# Patient Record
Sex: FEMALE | Race: BLACK OR AFRICAN AMERICAN | Employment: FULL TIME | URBAN - METROPOLITAN AREA
[De-identification: names, ages, dates, MRNs, and addresses within clinical notes are randomized per-mention and may not be internally consistent; named-entity substitution may affect disease eponyms.]

---

## 2017-01-25 ENCOUNTER — OFFICE VISIT (OUTPATIENT)
Dept: ORTHOPEDIC SURGERY | Age: 57
End: 2017-01-25

## 2017-01-25 VITALS
SYSTOLIC BLOOD PRESSURE: 148 MMHG | HEIGHT: 67 IN | WEIGHT: 227 LBS | BODY MASS INDEX: 35.63 KG/M2 | DIASTOLIC BLOOD PRESSURE: 72 MMHG | HEART RATE: 97 BPM

## 2017-01-25 DIAGNOSIS — M54.41 LUMBAGO WITH SCIATICA, RIGHT SIDE: ICD-10-CM

## 2017-01-25 DIAGNOSIS — M54.42 LUMBAGO WITH SCIATICA, LEFT SIDE: ICD-10-CM

## 2017-01-25 DIAGNOSIS — M48.061 LUMBAR SPINAL STENOSIS: Primary | ICD-10-CM

## 2017-01-25 RX ORDER — HYDROCODONE BITARTRATE AND ACETAMINOPHEN 5; 325 MG/1; MG/1
1 TABLET ORAL
Qty: 30 TAB | Refills: 0 | Status: SHIPPED | OUTPATIENT
Start: 2017-01-25 | End: 2021-04-30 | Stop reason: ALTCHOICE

## 2017-01-25 RX ORDER — DIAZEPAM 10 MG/1
TABLET ORAL
Qty: 1 TAB | Refills: 0 | OUTPATIENT
Start: 2017-01-25 | End: 2017-02-27 | Stop reason: ALTCHOICE

## 2017-01-25 RX ORDER — DULOXETIN HYDROCHLORIDE 30 MG/1
30 CAPSULE, DELAYED RELEASE ORAL DAILY
Qty: 90 CAP | Refills: 1 | Status: SHIPPED | OUTPATIENT
Start: 2017-01-25 | End: 2017-06-28 | Stop reason: SDUPTHER

## 2017-01-25 NOTE — PROGRESS NOTES
Chief complaint/History of Present Illness:  Chief Complaint   Patient presents with    Follow-up     lumbar BLE pain. RLE pain going into buttocks. LLE pain radiating to knee     HPI  Carmita Boucher is a  64 y.o.  female      HISTORY OF PRESENT ILLNESS:  The patient comes in today for followup of her chronic low back pain. She has degenerative disc disease with severe stenosis at L4-5. She was doing well at the last visit. However, since December without injury she has had a flare-up of pain that causes her pain to go to a 5-8/10. She brought out her TENS unit and has been using that, which did help some. She is on Cymbalta 30 mg once a day. That usually does help, but with the flare, she is still having the pain. She usually takes a rare Tramadol. That is not really helping. She is requesting a small amount of Norco that she has had a couple of years ago for a flare. She has failed Neurontin and Topamax in the past.  She is diabetic. Her blood sugar runs about 118. She has had lumbar L3 and L4 epidural steroid injections done. The last one was in January 2015 and before that, May 2014, and before that, December 2012. They usually do help her and last a significant amount of time. She denies fever or bowel or bladder dysfunction. She works at [employer] as a . She also uses a handicap sticker, as she has to walk one-quarter mile to her car when she is at work. PHYSICAL EXAM:  Ms. Sergio Hensley is a 59-year-old female. She is alert and oriented. Her skin is warm and dry. She has a normal mood and affect. She has 4/5 strength of the bilateral lower extremities, negative straight leg raise, and full weightbearing, non-antalgic gait. ASSESSENT/PLAN:  This is a patient who has degenerative disc disease and severe stenosis at L4-5. We are going to set her up for a repeat L3-4 lumbar epidural.  I have refilled her Cymbalta.   I am giving her 30 tablets of Norco 5/325 mg, which will probably last her a long time. We will give her a handicap sticker application filled out, and we will see her back after the block. Review of systems:    Past Medical History   Diagnosis Date    Arthritis     Cervical radiculopathy     Diabetes ([address]     Hypercholesteremia     Hypertension      Past Surgical History   Procedure Laterality Date    Hx other surgical Left      wrist tendon release    Hx hysterectomy      Hx breast lumpectomy      Hx tonsillectomy       Social History     Social History    Marital status:      Spouse name: N/A    Number of children: N/A    Years of education: N/A     Occupational History    Not on file. Social History Main Topics    Smoking status: Never Smoker    Smokeless tobacco: Not on file    Alcohol use 0.5 oz/week     1 Glasses of wine per week    Drug use: Not on file    Sexual activity: Not on file     Other Topics Concern    Not on file     Social History Narrative     Family History   Problem Relation Age of Onset    Cancer Other     Diabetes Other     Other Other      Connective tissue disorder       Physical Exam:  Visit Vitals    /72    Pulse 97    Ht 5' 7\" (1.702 m)    Wt 227 lb (103 kg)    BMI 35.55 kg/m2     Pain Scale: 8/10     has been . reviewed and is appropriate      Fela Khanna was seen today for follow-up. Diagnoses and all orders for this visit:    Lumbar spinal stenosis  -     SCHEDULE SURGERY    Lumbago with sciatica, left side  -     SCHEDULE SURGERY    Lumbago with sciatica, right side  -     SCHEDULE SURGERY    Other orders  -     DULoxetine (CYMBALTA) 30 mg capsule; Take 1 Cap by mouth daily.  -     HYDROcodone-acetaminophen (NORCO) 5-325 mg per tablet; Take 1 Tab by mouth every eight (8) hours as needed for Pain. Max Daily Amount: 3 Tabs. Indications: PAIN            Follow-up Disposition:  Return for after block.         We have informed Randy Kendrick to notify us for immediate appointment if she has any worsening neurogical symptoms or if an emergency situation presents, then call [phone]

## 2017-01-25 NOTE — PATIENT INSTRUCTIONS
Back Pain: Care Instructions  Your Care Instructions    Back pain has many possible causes. It is often related to problems with muscles and ligaments of the back. It may also be related to problems with the nerves, discs, or bones of the back. Moving, lifting, standing, sitting, or sleeping in an awkward way can strain the back. Sometimes you don't notice the injury until later. Arthritis is another common cause of back pain. Although it may hurt a lot, back pain usually improves on its own within several weeks. Most people recover in 12 weeks or less. Using good home treatment and being careful not to stress your back can help you feel better sooner. Follow-up care is a key part of your treatment and safety. Be sure to make and go to all appointments, and call your doctor if you are having problems. Its also a good idea to know your test results and keep a list of the medicines you take. How can you care for yourself at home? · Sit or lie in positions that are most comfortable and reduce your pain. Try one of these positions when you lie down:  ¨ Lie on your back with your knees bent and supported by large pillows. ¨ Lie on the floor with your legs on the seat of a sofa or chair. Clovia Evener on your side with your knees and hips bent and a pillow between your legs. ¨ Lie on your stomach if it does not make pain worse. · Do not sit up in bed, and avoid soft couches and twisted positions. Bed rest can help relieve pain at first, but it delays healing. Avoid bed rest after the first day of back pain. · Change positions every 30 minutes. If you must sit for long periods of time, take breaks from sitting. Get up and walk around, or lie in a comfortable position. · Try using a heating pad on a low or medium setting for 15 to 20 minutes every 2 or 3 hours. Try a warm shower in place of one session with the heating pad. · You can also try an ice pack for 10 to 15 minutes every 2 to 3 hours.  Put a thin cloth between the ice pack and your skin. · Take pain medicines exactly as directed. ¨ If the doctor gave you a prescription medicine for pain, take it as prescribed. ¨ If you are not taking a prescription pain medicine, ask your doctor if you can take an over-the-counter medicine. · Take short walks several times a day. You can start with 5 to 10 minutes, 3 or 4 times a day, and work up to longer walks. Walk on level surfaces and avoid hills and stairs until your back is better. · Return to work and other activities as soon as you can. Continued rest without activity is usually not good for your back. · To prevent future back pain, do exercises to stretch and strengthen your back and stomach. Learn how to use good posture, safe lifting techniques, and proper body mechanics. When should you call for help? Call your doctor now or seek immediate medical care if:  · You have new or worsening numbness in your legs. · You have new or worsening weakness in your legs. (This could make it hard to stand up.)  · You lose control of your bladder or bowels. Watch closely for changes in your health, and be sure to contact your doctor if:  · Your pain gets worse. · You are not getting better after 2 weeks. Where can you learn more? Go to http://tamera-mattie.info/. Enter A997 in the search box to learn more about \"Back Pain: Care Instructions. \"  Current as of: May 23, 2016  Content Version: 11.1  © 8065-5906 [employer]. Care instructions adapted under license by [employer] (which disclaims liability or warranty for this information). If you have questions about a medical condition or this instruction, always ask your healthcare professional. [address] any warranty or liability for your use of this information.

## 2017-02-27 ENCOUNTER — OFFICE VISIT (OUTPATIENT)
Dept: ORTHOPEDIC SURGERY | Age: 57
End: 2017-02-27

## 2017-02-27 VITALS
RESPIRATION RATE: 18 BRPM | WEIGHT: 231.2 LBS | SYSTOLIC BLOOD PRESSURE: 150 MMHG | HEIGHT: 67 IN | BODY MASS INDEX: 36.29 KG/M2 | HEART RATE: 94 BPM | DIASTOLIC BLOOD PRESSURE: 78 MMHG

## 2017-02-27 DIAGNOSIS — M51.36 DDD (DEGENERATIVE DISC DISEASE), LUMBAR: ICD-10-CM

## 2017-02-27 DIAGNOSIS — M48.061 SPINAL STENOSIS, LUMBAR: Primary | ICD-10-CM

## 2017-02-27 DIAGNOSIS — M51.26 LUMBAR HERNIATED DISC: ICD-10-CM

## 2017-02-27 RX ORDER — DULOXETIN HYDROCHLORIDE 60 MG/1
60 CAPSULE, DELAYED RELEASE ORAL DAILY
Qty: 60 CAP | Refills: 1 | Status: SHIPPED | OUTPATIENT
Start: 2017-02-27 | End: 2017-03-29 | Stop reason: SDUPTHER

## 2017-02-27 NOTE — MR AVS SNAPSHOT
Visit Information Date & Time Provider Department Dept. Phone Encounter #  
 2/27/2017  2:55 PM Dolly Green MD [facility] - [facility] [phone] [id number] Follow-up Instructions Return in about 4 weeks (around 3/27/2017). Upcoming Health Maintenance Date Due Hepatitis C Screening 1960 DTaP/Tdap/Td series (1 - Tdap) 7/11/1981 PAP AKA CERVICAL CYTOLOGY 7/11/1981 BREAST CANCER SCRN MAMMOGRAM 7/11/2010 FOBT Q 1 YEAR AGE 50-75 7/11/2010 INFLUENZA AGE 9 TO ADULT 8/1/2016 Allergies as of 2/27/2017  Review Complete On: 2/27/2017 By: Dolly Green MD  
  
 Severity Noted Reaction Type Reactions Actos [Pioglitazone]    Unable to Obtain Daypro [Oxaprozin]  10/15/2012    Unknown (comments) Lisinopril    Unable to Obtain Other Medication    Unable to Obtain Other allergies are: Antiinflammatories Algesics And several diabetic medications Topamax [Topiramate]  01/25/2017    Other (comments) Unknown reaction Zocor [Simvastatin]  01/07/2016    Seizures Current Immunizations  Never Reviewed No immunizations on file. Not reviewed this visit You Were Diagnosed With   
  
 Codes Comments Spinal stenosis, lumbar    -  Primary ICD-10-CM: M48.06 
ICD-9-CM: 724.02 Lumbar herniated disc     ICD-10-CM: M51.26 
ICD-9-CM: 722.10 DDD (degenerative disc disease), lumbar     ICD-10-CM: M51.36 
ICD-9-CM: 722.52 Vitals BP  
  
  
  
  
  
 150/78 (BP 1 Location: Right arm, BP Patient Position: Sitting) BMI and BSA Data Body Mass Index Body Surface Area  
 36.21 kg/m 2 2.23 m 2 Preferred Pharmacy Pharmacy Name Phone [address] Your Updated Medication List  
  
   
This list is accurate as of: 2/27/17  3:33 PM.  Always use your most recent med list.  
  
  
  
  
 clonazePAM 0.5 mg tablet Commonly known as:  KlonoPIN  
  
 * CRESTOR 40 mg tablet Generic drug:  rosuvastatin Take 40 mg by mouth nightly. * CRESTOR 20 mg tablet Generic drug:  rosuvastatin * DULoxetine 20 mg capsule Commonly known as:  CYMBALTA Take 1 Cap by mouth daily. * DULoxetine 30 mg capsule Commonly known as:  CYMBALTA Take 1 Cap by mouth daily. * DULoxetine 60 mg capsule Commonly known as:  CYMBALTA Take 1 Cap by mouth daily. FLEXERIL 10 mg tablet Generic drug:  cyclobenzaprine Take  by mouth three (3) times daily as needed for Muscle Spasm(s). gabapentin 800 mg tablet Commonly known as:  NEURONTIN Take 1 Tab by mouth three (3) times daily. HYDROcodone-acetaminophen 5-325 mg per tablet Commonly known as:  [place] Cuff Take 1 Tab by mouth every eight (8) hours as needed for Pain. Max Daily Amount: 3 Tabs. Indications: PAIN  
  
 LANTUS SOLOSTAR 100 unit/mL (3 mL) pen Generic drug:  insulin glargine  
  
 losartan 50 mg tab 100 mg, hydrochlorothiazide 25 mg tab 25 mg Take  by mouth daily. metFORMIN 1,000 mg tablet Commonly known as:  GLUCOPHAGE Take 1,000 mg by mouth two (2) times daily (with meals). topiramate 25 mg tablet Commonly known as:  TOPAMAX 3 tabs PO QHS  
  
 traMADol 50 mg tablet Commonly known as:  ULTRAM  
Take 1 Tab by mouth daily. Max Daily Amount: 50 mg.  
  
 TYLENOL EXTRA STRENGTH PO Take  by mouth. * Notice: This list has 5 medication(s) that are the same as other medications prescribed for you. Read the directions carefully, and ask your doctor or other care provider to review them with you. Prescriptions Sent to Pharmacy Refills DULoxetine (CYMBALTA) 60 mg capsule 1 Sig: Take 1 Cap by mouth daily. Class: Normal  
 Pharmacy: [address] [place] [address] #: [phone] Route: Oral  
  
Follow-up Instructions Return in about 4 weeks (around 3/27/2017). Introducing [facility]! Zayra Jacob introduces [facility] patient portal. Now you can access parts of your medical record, email your doctor's office, and request medication refills online. 1. In your internet browser, go to [email or web address] 2. Click on the First Time User? Click Here link in the Sign In box. You will see the New Member Sign Up page. 3. Enter your [facility] Access Code exactly as it appears below. You will not need to use this code after youve completed the sign-up process. If you do not sign up before the expiration date, you must request a new code. · [facility] Access Code: [email or web address] Expires: 4/25/2017  9:38 AM 
 
4. Enter the last four digits of your Social Security Number (xxxx) and Date of Birth (mm/dd/yyyy) as indicated and click Submit. You will be taken to the next sign-up page. 5. Create a [facility] ID. This will be your [facility] login ID and cannot be changed, so think of one that is secure and easy to remember. 6. Create a [facility] password. You can change your password at any time. 7. Enter your Password Reset Question and Answer. This can be used at a later time if you forget your password. 8. Enter your e-mail address. You will receive e-mail notification when new information is available in [address] 9. Click Sign Up. You can now view and download portions of your medical record. 10. Click the Download Summary menu link to download a portable copy of your medical information. If you have questions, please visit the Frequently Asked Questions section of the [facility] website. Remember, [facility] is NOT to be used for urgent needs. For medical emergencies, dial 911. Now available from your iPhone and Android! Please provide this summary of care documentation to your next provider. Your primary care clinician is listed as Lucho Franco.  If you have any questions after today's visit, please call [phone].

## 2017-02-27 NOTE — PROGRESS NOTES
[facility]  [address], [address]  Phone: [phone]  Fax: [phone]        PROGRESS NOTE      HISTORY OF PRESENT ILLNESS:  The patient is a 64 y.o. female and was seen today for follow up of localized low back pain. Patient states she no longer has radiating pain into the LLE. She previously reported having 1-2 episodes of LLE numbness. Patient states symptoms are holding steady. Patient notes slight limitations in walking and standing tolerance and has concerns of not being able to lose weight. Previously, she was seen with c/o low back pain extending into the bilateral lower extremities in an S1 distribution with right lower extremity symptoms extending initially to the knee and left lower extremity symptoms to the ankle. The patient additional had complaints of neck pain. Note from Yumiko Leon NP dated 1/7/16 indicating patient was seen with c/o low back pain into BLE. Taylor Rm Patient failed NEURONTIN and TOPAMAX. Patient tolerates increased Cymbalta 30 mg per day with good relief. She reports rare use of Tramadol. Patient was prescribed Klonopin for insomnia but states she has discontinued use. Patient is completing her HEP daily. She has hx of DM and reports checking her blood sugars 3x/day. She denies hx of glaucoma. At that time, patient was doing well. Lumbar spine MRI per report revealed degenerative disc disease at L3-L4, L4-L5 and L5-S1 with severe spinal canal stenosis at L4-L5 secondary to a disc protrusion and degenerative facet changes. Additionally, the patient had complaints of neck pain. Upper extremity EMG per report revealed evidence of moderately severe carpal tunnel syndrome bilaterally, worse on the right. Cervical spine MRI per report revealed degenerative disc disease at multiple levels with foraminal encroachment and uncovertebral joint spurring.  At her last clinic appointment, she had been doing well and states occasional pain was tolerable. Patient wished to continue her current treatment. She was provided with refills of Cymbalta 30 mg per day. I recommended she complete HEP daily. The patient returns today with low back pain into the BLE extending in an S1 distribution to the knees. She rates pain 4/10, elevated since her last visit (0/10). Pain is exacerbated by standing and ambulating. Symptoms consistent for stenosis. Note from Atilio Hahn NP dated 1/25/17 indicating patient was seen after flare up of pain from mid-December. Pt is s/p L3/4 epidural from 1/31/17 reporting some relief but notes first block offered better relief. Pt is compliant with Cymbalta 30 mg per day. She completes her HEP daily.  reviewed. Past Medical History:   Diagnosis Date    Arthritis     Cervical radiculopathy     Diabetes ([facility] [address]     Hypercholesteremia     Hypertension         Social History     Social History    Marital status:      Spouse name: N/A    Number of children: N/A    Years of education: N/A     Occupational History    Not on file. Social History Main Topics    Smoking status: Never Smoker    Smokeless tobacco: Never Used    Alcohol use 0.5 oz/week     1 Glasses of wine per week    Drug use: No    Sexual activity: Not on file     Other Topics Concern    Not on file     Social History Narrative       Current Outpatient Prescriptions   Medication Sig Dispense Refill    DULoxetine (CYMBALTA) 60 mg capsule Take 1 Cap by mouth daily. 60 Cap 1    DULoxetine (CYMBALTA) 30 mg capsule Take 1 Cap by mouth daily. 90 Cap 1    HYDROcodone-acetaminophen (NORCO) 5-325 mg per tablet Take 1 Tab by mouth every eight (8) hours as needed for Pain. Max Daily Amount: 3 Tabs. Indications: PAIN 30 Tab 0    LANTUS SOLOSTAR 100 unit/mL (3 mL) pen       CRESTOR 20 mg tablet       metFORMIN (GLUCOPHAGE) 1,000 mg tablet Take 1,000 mg by mouth two (2) times daily (with meals).       losartan 50 mg tab 100 mg, hydrochlorothiazide 25 mg tab 25 mg Take  by mouth daily.  ACETAMINOPHEN (TYLENOL EXTRA STRENGTH PO) Take  by mouth.  DULoxetine (CYMBALTA) 20 mg capsule Take 1 Cap by mouth daily. 30 Cap 1    topiramate (TOPAMAX) 25 mg tablet 3 tabs PO QHS 90 Tab 1    traMADol (ULTRAM) 50 mg tablet Take 1 Tab by mouth daily. Max Daily Amount: 50 mg. 30 Tab 0    clonazePAM (KLONOPIN) 0.5 mg tablet       gabapentin (NEURONTIN) 800 mg tablet Take 1 Tab by mouth three (3) times daily. 270 Tab 0    rosuvastatin (CRESTOR) 40 mg tablet Take 40 mg by mouth nightly.  cyclobenzaprine (FLEXERIL) 10 mg tablet Take  by mouth three (3) times daily as needed for Muscle Spasm(s). Allergies   Allergen Reactions    Actos [Pioglitazone] Unable to Obtain    Daypro [Oxaprozin] Unknown (comments)    Lisinopril Unable to Obtain    Other Medication Unable to Obtain     Other allergies are:  Antiinflammatories  Algesics  And several diabetic medications    Topamax [Topiramate] Other (comments)     Unknown reaction    Zocor [Simvastatin] Seizures          PHYSICAL EXAMINATION    Visit Vitals    /78 (BP 1 Location: Right arm, BP Patient Position: Sitting)    Pulse 94    Resp 18    Ht 5' 7\" (1.702 m)    Wt 231 lb 3.2 oz (104.9 kg)    BMI 36.21 kg/m2       CONSTITUTIONAL: NAD, A&O x 3  SENSATION: Intact to light touch throughout  RANGE OF MOTION: The patient has full passive range of motion in all four extremities. MOTOR:  Straight Leg Raise: Negative, bilateral               Hip Flex Knee Ext Knee Flex Ankle DF GTE Ankle PF Tone   Right +4/5 +4/5 +4/5 +4/5 +4/5 +4/5 +4/5   Left +4/5 +4/5 +4/5 +4/5 +4/5 +4/5 +4/5       ASSESSMENT   Charan Elaine was seen today for back pain and leg pain. Diagnoses and all orders for this visit:    Spinal stenosis, lumbar    Lumbar herniated disc    DDD (degenerative disc disease), lumbar    Other orders  -     DULoxetine (CYMBALTA) 60 mg capsule; Take 1 Cap by mouth daily.           IMPRESSION AND PLAN:  The patient is not interested in surgical intervention or additional lumbar blocks at this time. I will increase her Cymbalta to 60 mg per day. Patient advised to call the office if intolerant to higher dose. I encourage patient to perform her HEP daily. I will see the patient back in 1 month's time or earlier if needed. Written by Jennie Haddad, as dictated by Leandro Nam MD  I examined the patient, reviewed and agree with the note.

## 2017-03-29 ENCOUNTER — OFFICE VISIT (OUTPATIENT)
Dept: ORTHOPEDIC SURGERY | Age: 57
End: 2017-03-29

## 2017-03-29 VITALS
BODY MASS INDEX: 36.26 KG/M2 | HEIGHT: 67 IN | SYSTOLIC BLOOD PRESSURE: 152 MMHG | WEIGHT: 231 LBS | RESPIRATION RATE: 16 BRPM | DIASTOLIC BLOOD PRESSURE: 82 MMHG | HEART RATE: 84 BPM

## 2017-03-29 DIAGNOSIS — M51.26 OTHER INTERVERTEBRAL DISC DISPLACEMENT, LUMBAR REGION: ICD-10-CM

## 2017-03-29 DIAGNOSIS — M48.061 SPINAL STENOSIS, LUMBAR: ICD-10-CM

## 2017-03-29 DIAGNOSIS — M51.36 OTHER INTERVERTEBRAL DISC DEGENERATION, LUMBAR REGION: Primary | ICD-10-CM

## 2017-03-29 RX ORDER — DULOXETIN HYDROCHLORIDE 60 MG/1
60 CAPSULE, DELAYED RELEASE ORAL DAILY
Qty: 90 CAP | Refills: 0 | Status: SHIPPED | OUTPATIENT
Start: 2017-03-29 | End: 2017-06-28 | Stop reason: SDUPTHER

## 2017-03-29 RX ORDER — OXYCODONE AND ACETAMINOPHEN 5; 325 MG/1; MG/1
TABLET ORAL
Refills: 0 | COMMUNITY
Start: 2017-03-16 | End: 2021-04-30 | Stop reason: ALTCHOICE

## 2017-03-29 NOTE — MR AVS SNAPSHOT
Visit Information Date & Time Provider Department Dept. Phone Encounter #  
 3/29/2017 11:10 AM Claudette Waite MD [address] [place] [address], [address] [facility] [place] [phone] [id number] Follow-up Instructions Return in about 3 months (around 6/29/2017). Upcoming Health Maintenance Date Due Hepatitis C Screening 1960 DTaP/Tdap/Td series (1 - Tdap) 7/11/1981 PAP AKA CERVICAL CYTOLOGY 7/11/1981 BREAST CANCER SCRN MAMMOGRAM 7/11/2010 FOBT Q 1 YEAR AGE 50-75 7/11/2010 INFLUENZA AGE 9 TO ADULT 8/1/2016 Allergies as of 3/29/2017  Review Complete On: 3/29/2017 By: Claudette Waite MD  
  
 Severity Noted Reaction Type Reactions Actos [Pioglitazone]    Unable to Obtain Daypro [Oxaprozin]  10/15/2012    Unknown (comments) Lisinopril    Unable to Obtain Other Medication    Unable to Obtain Other allergies are: Antiinflammatories Algesics And several diabetic medications Topamax [Topiramate]  01/25/2017    Other (comments) Unknown reaction Zocor [Simvastatin]  01/07/2016    Seizures Current Immunizations  Never Reviewed No immunizations on file. Not reviewed this visit You Were Diagnosed With   
  
 Codes Comments Other intervertebral disc degeneration, lumbar region    -  Primary ICD-10-CM: M51.36 
ICD-9-CM: 722.52 Other intervertebral disc displacement, lumbar region     ICD-10-CM: M51.26 
ICD-9-CM: 722.10 Spinal stenosis, lumbar     ICD-10-CM: M48.06 
ICD-9-CM: 724.02 Vitals BP Pulse Resp Height(growth percentile) Weight(growth percentile) BMI  
 152/82 (BP 1 Location: Right arm, BP Patient Position: Sitting) 84 16 5' 7\" (1.702 m) 231 lb (104.8 kg) 36.18 kg/m2 Smoking Status Never Smoker Vitals History BMI and BSA Data Body Mass Index Body Surface Area  
 36.18 kg/m 2 2.23 m 2 Preferred Pharmacy Pharmacy Name Phone [address] [place] [address] Your Updated Medication List  
  
   
This list is accurate as of: 3/29/17 12:13 PM.  Always use your most recent med list.  
  
  
  
  
 clonazePAM 0.5 mg tablet Commonly known as:  KlonoPIN  
  
 CRESTOR 20 mg tablet Generic drug:  rosuvastatin * DULoxetine 20 mg capsule Commonly known as:  CYMBALTA Take 1 Cap by mouth daily. * DULoxetine 30 mg capsule Commonly known as:  CYMBALTA Take 1 Cap by mouth daily. * DULoxetine 60 mg capsule Commonly known as:  CYMBALTA Take 1 Cap by mouth daily. Indications: NEUROPATHIC PAIN  
  
 FLEXERIL 10 mg tablet Generic drug:  cyclobenzaprine Take  by mouth three (3) times daily as needed for Muscle Spasm(s). gabapentin 800 mg tablet Commonly known as:  NEURONTIN Take 1 Tab by mouth three (3) times daily. HYDROcodone-acetaminophen 5-325 mg per tablet Commonly known as:  Francisco Argueta Take 1 Tab by mouth every eight (8) hours as needed for Pain. Max Daily Amount: 3 Tabs. Indications: PAIN  
  
 LANTUS SOLOSTAR 100 unit/mL (3 mL) pen Generic drug:  insulin glargine  
  
 losartan 50 mg tab 100 mg, hydrochlorothiazide 25 mg tab 25 mg Take  by mouth daily. metFORMIN 1,000 mg tablet Commonly known as:  GLUCOPHAGE Take 1,000 mg by mouth two (2) times daily (with meals). oxyCODONE-acetaminophen 5-325 mg per tablet Commonly known as:  PERCOCET  
take 1 to 2 tablets by mouth every 4 to 6 hours if needed for pain  
  
 topiramate 25 mg tablet Commonly known as:  TOPAMAX 3 tabs PO QHS  
  
 traMADol 50 mg tablet Commonly known as:  ULTRAM  
Take 1 Tab by mouth daily. Max Daily Amount: 50 mg.  
  
 TYLENOL EXTRA STRENGTH PO Take  by mouth. * Notice: This list has 3 medication(s) that are the same as other medications prescribed for you.  Read the directions carefully, and ask your doctor or other care provider to review them with you. Prescriptions Sent to Pharmacy Refills DULoxetine (CYMBALTA) 60 mg capsule 0 Sig: Take 1 Cap by mouth daily. Indications: NEUROPATHIC PAIN Class: Normal  
 Pharmacy: [address] [place] [address] #: [phone] Route: Oral  
  
Follow-up Instructions Return in about 3 months (around 6/29/2017). Introducing [facility]! Gregory Zamora introduces [employer] patient portal. Now you can access parts of your medical record, email your doctor's office, and request medication refills online. 1. In your internet browser, go to [email or web address] 2. Click on the First Time User? Click Here link in the Sign In box. You will see the New Member Sign Up page. 3. Enter your [employer] Access Code exactly as it appears below. You will not need to use this code after youve completed the sign-up process. If you do not sign up before the expiration date, you must request a new code. · [employer] Access Code: [email or web address] Expires: 4/25/2017 10:38 AM 
 
4. Enter the last four digits of your Social Security Number (xxxx) and Date of Birth (mm/dd/yyyy) as indicated and click Submit. You will be taken to the next sign-up page. 5. Create a [employer] Art ID. This will be your [employer] login ID and cannot be changed, so think of one that is secure and easy to remember. 6. Create a [employer] password. You can change your password at any time. 7. Enter your Password Reset Question and Answer. This can be used at a later time if you forget your password. 8. Enter your e-mail address. You will receive e-mail notification when new information is available in [address] 9. Click Sign Up. You can now view and download portions of your medical record. 10. Click the Download Summary menu link to download a portable copy of your medical information. If you have questions, please visit the Frequently Asked Questions section of the [employer] website. Remember, [employer] is NOT to be used for urgent needs. For medical emergencies, dial 911. Now available from your iPhone and Android! Please provide this summary of care documentation to your next provider. Your primary care clinician is listed as Jed Riddle. If you have any questions after today's visit, please call [phone].

## 2017-03-29 NOTE — PROGRESS NOTES
[facility]  [address], [address]  Phone: [phone]  Fax: [phone]        PROGRESS NOTE      HISTORY OF PRESENT ILLNESS:  The patient is a 64 y.o. female and was seen today for follow up of low back pain into the BLE extending in an S1 distribution to the knees. Pain is exacerbated by standing and ambulating. Symptoms consistent for stenosis. The patient additional had complaints of neck pain. Pt underwent L3/4 epidural from 1/31/17 reporting some relief but notes first block offered better relief. Pt failed NEURONTIN and TOPAMAX. Pt is compliant with Cymbalta 30 mg per day. She reports rare use of Tramadol. Pt was prescribed Klonopin for insomnia but states she has discontinued use. Pt is completing her HEP daily. She has hx of DM and reports checking her blood sugars 3x/day. She denies hx of glaucoma. At that time, patient was doing well. Lumbar spine MRI per report revealed degenerative disc disease at L3-L4, L4-L5 and L5-S1 with severe spinal canal stenosis at L4-L5 secondary to a disc protrusion and degenerative facet changes. Additionally, the patient had complaints of neck pain. Upper extremity EMG per report revealed evidence of moderately severe carpal tunnel syndrome bilaterally, worse on the right. Cervical spine MRI per report revealed degenerative disc disease at multiple levels with foraminal encroachment and uncovertebral joint spurring. At her last clinic appointment, the patient was not interested in surgical intervention or additional lumbar blocks at that time. I increased her Cymbalta to 60 mg per day. I encouraged patient to perform her HEP daily. I will see the patient back in 1 month's time or earlier if needed. The patient returns today with pain location and distribution remain unchanged. Her pain remains unchanged at 4/10, which patient reports is her baseline level of pain.  She is tolerating increased dose of Cymbalta 60 mg per day with improved standing and walking tolerance. Pt notes more good days than bad. She continues to perform her HEP daily.  reviewed. Past Medical History:   Diagnosis Date    Arthritis     Cervical radiculopathy     Diabetes ([address]     Hypercholesteremia     Hypertension         Social History     Social History    Marital status:      Spouse name: N/A    Number of children: N/A    Years of education: N/A     Occupational History    Not on file. Social History Main Topics    Smoking status: Never Smoker    Smokeless tobacco: Never Used    Alcohol use 0.5 oz/week     1 Glasses of wine per week    Drug use: No    Sexual activity: Not on file     Other Topics Concern    Not on file     Social History Narrative       Current Outpatient Prescriptions   Medication Sig Dispense Refill    DULoxetine (CYMBALTA) 60 mg capsule Take 1 Cap by mouth daily. Indications: NEUROPATHIC PAIN 90 Cap 0    LANTUS SOLOSTAR 100 unit/mL (3 mL) pen       CRESTOR 20 mg tablet       metFORMIN (GLUCOPHAGE) 1,000 mg tablet Take 1,000 mg by mouth two (2) times daily (with meals).  losartan 50 mg tab 100 mg, hydrochlorothiazide 25 mg tab 25 mg Take  by mouth daily.  ACETAMINOPHEN (TYLENOL EXTRA STRENGTH PO) Take  by mouth.  oxyCODONE-acetaminophen (PERCOCET) 5-325 mg per tablet take 1 to 2 tablets by mouth every 4 to 6 hours if needed for pain  0    DULoxetine (CYMBALTA) 30 mg capsule Take 1 Cap by mouth daily. 90 Cap 1    HYDROcodone-acetaminophen (NORCO) 5-325 mg per tablet Take 1 Tab by mouth every eight (8) hours as needed for Pain. Max Daily Amount: 3 Tabs. Indications: PAIN 30 Tab 0    DULoxetine (CYMBALTA) 20 mg capsule Take 1 Cap by mouth daily. 30 Cap 1    topiramate (TOPAMAX) 25 mg tablet 3 tabs PO QHS 90 Tab 1    traMADol (ULTRAM) 50 mg tablet Take 1 Tab by mouth daily.  Max Daily Amount: 50 mg. 30 Tab 0    clonazePAM (KLONOPIN) 0.5 mg tablet       gabapentin (NEURONTIN) 800 mg tablet Take 1 Tab by mouth three (3) times daily. 270 Tab 0    cyclobenzaprine (FLEXERIL) 10 mg tablet Take  by mouth three (3) times daily as needed for Muscle Spasm(s). Allergies   Allergen Reactions    Actos [Pioglitazone] Unable to Obtain    Daypro [Oxaprozin] Unknown (comments)    Lisinopril Unable to Obtain    Other Medication Unable to Obtain     Other allergies are:  Antiinflammatories  Algesics  And several diabetic medications    Topamax [Topiramate] Other (comments)     Unknown reaction    Zocor [Simvastatin] Seizures          PHYSICAL EXAMINATION    Visit Vitals    /82 (BP 1 Location: Right arm, BP Patient Position: Sitting)    Pulse 84    Resp 16    Ht 5' 7\" (1.702 m)    Wt 231 lb (104.8 kg)    BMI 36.18 kg/m2       CONSTITUTIONAL: NAD, A&O x 3  SENSATION: Intact to light touch throughout  RANGE OF MOTION: The patient has full passive range of motion in all four extremities. MOTOR:  Straight Leg Raise: Negative, bilateral               Hip Flex Knee Ext Knee Flex Ankle DF GTE Ankle PF Tone   Right +4/5 +4/5 +4/5 +4/5 +4/5 +4/5 +4/5   Left +4/5 +4/5 +4/5 +4/5 +4/5 +4/5 +4/5       ASSESSMENT   Bernardino Stone was seen today for back pain. Diagnoses and all orders for this visit:    Other intervertebral disc degeneration, lumbar region  -     DULoxetine (CYMBALTA) 60 mg capsule; Take 1 Cap by mouth daily. Indications: NEUROPATHIC PAIN    Other intervertebral disc displacement, lumbar region  -     DULoxetine (CYMBALTA) 60 mg capsule; Take 1 Cap by mouth daily. Indications: NEUROPATHIC PAIN    Spinal stenosis, lumbar  -     DULoxetine (CYMBALTA) 60 mg capsule; Take 1 Cap by mouth daily. Indications: NEUROPATHIC PAIN      IMPRESSION AND PLAN:  Her standing and walking tolerance has improved with increased dose of Cymbalta to 60 mg per day. I did offer adding Lyrica or Gabitril alongside her Cymbalta which she declines at this time.  Patient would like to continue her current regiment. She was provided with refills of Cymbalta 60 mg per day. I encourage patient to perform her HEP daily. I will see the patient back in 3 month's time or earlier if needed. Written by Funmilayo Montiel, as dictated by Urszula Henderson MD  I examined the patient, reviewed and agree with the note.

## 2017-06-28 ENCOUNTER — OFFICE VISIT (OUTPATIENT)
Dept: ORTHOPEDIC SURGERY | Age: 57
End: 2017-06-28

## 2017-06-28 VITALS
DIASTOLIC BLOOD PRESSURE: 70 MMHG | BODY MASS INDEX: 36.13 KG/M2 | HEART RATE: 96 BPM | HEIGHT: 67 IN | WEIGHT: 230.2 LBS | SYSTOLIC BLOOD PRESSURE: 138 MMHG | RESPIRATION RATE: 16 BRPM

## 2017-06-28 DIAGNOSIS — M51.26 OTHER INTERVERTEBRAL DISC DISPLACEMENT, LUMBAR REGION: ICD-10-CM

## 2017-06-28 DIAGNOSIS — M51.36 OTHER INTERVERTEBRAL DISC DEGENERATION, LUMBAR REGION: ICD-10-CM

## 2017-06-28 DIAGNOSIS — M48.061 SPINAL STENOSIS, LUMBAR: Primary | ICD-10-CM

## 2017-06-28 RX ORDER — INSULIN ASPART 100 [IU]/ML
INJECTION, SOLUTION INTRAVENOUS; SUBCUTANEOUS
COMMUNITY
End: 2021-04-30 | Stop reason: ALTCHOICE

## 2017-06-28 RX ORDER — DIAZEPAM 10 MG/1
TABLET ORAL
Qty: 1 TAB | Refills: 0 | OUTPATIENT
Start: 2017-06-28 | End: 2017-08-23 | Stop reason: ALTCHOICE

## 2017-06-28 RX ORDER — DULOXETIN HYDROCHLORIDE 60 MG/1
60 CAPSULE, DELAYED RELEASE ORAL DAILY
Qty: 90 CAP | Refills: 0 | Status: SHIPPED | OUTPATIENT
Start: 2017-06-28 | End: 2017-08-28 | Stop reason: SDUPTHER

## 2017-06-28 NOTE — PROGRESS NOTES
[facility]  [address], [address]  Phone: [phone]  Fax: [phone]        PROGRESS NOTE      HISTORY OF PRESENT ILLNESS:  The patient is a 64 y.o. female and was seen today for follow up of low back pain into the BLE extending in an S1 distribution to the knees. Pain is exacerbated by standing and ambulating. Symptoms consistent for stenosis. Pt reports her baseline level of pain at 4/10. The patient additional had complaints of neck pain. Pt underwent L3/4 epidural from 1/31/17 reporting some relief but notes prior block offered better relief. Pt failed NEURONTIN and TOPAMAX. Pt is compliant with Cymbalta 30 mg per day. She reports rare use of Tramadol. Pt was prescribed Klonopin for insomnia but states she has discontinued use. Pt is completing her HEP daily. She has hx of DM and reports monitoring her blood sugars 3x/day. She denies hx of glaucoma. At that time, patient was doing well. Lumbar spine MRI per report revealed degenerative disc disease at L3-L4, L4-L5 and L5-S1 with severe spinal canal stenosis at L4-L5 secondary to a disc protrusion and degenerative facet changes. Additionally, the patient had complaints of neck pain. Upper extremity EMG per report revealed evidence of moderately severe carpal tunnel syndrome bilaterally, worse on the right. Cervical spine MRI per report revealed degenerative disc disease at multiple levels with foraminal encroachment and uncovertebral joint spurring. At her last clinic appointment, her standing and walking tolerance had improved with increased dose of Cymbalta to 60 mg per day. I did offer adding Lyrica or Gabitril alongside her Cymbalta which she declined at that time. Patient wished to continue her current regiment. She was provided with refills of Cymbalta 60 mg per day. I encouraged patient to perform her HEP daily. The patient returns today with increased low back pain x 1 month.  Pt denies radicular symptoms into the BLE at this time. She rates pain 8/10, elevated since her last visit (4/10). Per patient, she has a torn meniscus of the irght knee which could possibly be contributing to her increase in low back pain. Pt is compliant with Cymbalta 60 mg per day. She completes her HEP daily.  reviewed. Past Medical History:   Diagnosis Date    Arthritis     Cervical radiculopathy     Diabetes ([facility] [address]     Hypercholesteremia     Hypertension         Social History     Social History    Marital status:      Spouse name: N/A    Number of children: N/A    Years of education: N/A     Occupational History    Not on file. Social History Main Topics    Smoking status: Never Smoker    Smokeless tobacco: Never Used    Alcohol use 0.5 oz/week     1 Glasses of wine per week    Drug use: No    Sexual activity: Not on file     Other Topics Concern    Not on file     Social History Narrative       Current Outpatient Prescriptions   Medication Sig Dispense Refill    insulin aspart (NOVOLOG FLEXPEN) 100 unit/mL inpn by SubCUTAneous route.  DULoxetine (CYMBALTA) 60 mg capsule Take 1 Cap by mouth daily. Indications: NEUROPATHIC PAIN 90 Cap 0    LANTUS SOLOSTAR 100 unit/mL (3 mL) pen       CRESTOR 20 mg tablet       metFORMIN (GLUCOPHAGE) 1,000 mg tablet Take 1,000 mg by mouth two (2) times daily (with meals).  losartan 50 mg tab 100 mg, hydrochlorothiazide 25 mg tab 25 mg Take  by mouth daily.  ACETAMINOPHEN (TYLENOL EXTRA STRENGTH PO) Take  by mouth.  oxyCODONE-acetaminophen (PERCOCET) 5-325 mg per tablet take 1 to 2 tablets by mouth every 4 to 6 hours if needed for pain  0    HYDROcodone-acetaminophen (NORCO) 5-325 mg per tablet Take 1 Tab by mouth every eight (8) hours as needed for Pain. Max Daily Amount: 3 Tabs. Indications: PAIN 30 Tab 0    traMADol (ULTRAM) 50 mg tablet Take 1 Tab by mouth daily.  Max Daily Amount: 50 mg. 30 Tab 0    clonazePAM (KLONOPIN) 0.5 mg tablet       cyclobenzaprine (FLEXERIL) 10 mg tablet Take  by mouth three (3) times daily as needed for Muscle Spasm(s). Allergies   Allergen Reactions    Actos [Pioglitazone] Unable to Obtain    Daypro [Oxaprozin] Unknown (comments)    Lisinopril Unable to Obtain    Other Medication Unable to Obtain     Other allergies are:  Antiinflammatories  Algesics  And several diabetic medications    Topamax [Topiramate] Other (comments)     Unknown reaction    Zocor [Simvastatin] Seizures          PHYSICAL EXAMINATION    Visit Vitals    /70    Pulse 96    Resp 16    Ht 5' 7\" (1.702 m)    Wt 230 lb 3.2 oz (104.4 kg)    BMI 36.05 kg/m2       CONSTITUTIONAL: NAD, A&O x 3  SENSATION: Intact to light touch throughout  RANGE OF MOTION: The patient has full passive range of motion in all four extremities. MOTOR:  Straight Leg Raise: Negative, bilateral               Hip Flex Knee Ext Knee Flex Ankle DF GTE Ankle PF Tone   Right +4/5 +4/5 +4/5 +4/5 +4/5 +4/5 +4/5   Left +4/5 +4/5 +4/5 +4/5 +4/5 +4/5 +4/5       ASSESSMENT   Evelyn Juarez was seen today for back pain and leg pain. Diagnoses and all orders for this visit:    Spinal stenosis, lumbar  -     DULoxetine (CYMBALTA) 60 mg capsule; Take 1 Cap by mouth daily. Indications: NEUROPATHIC PAIN  -     SCHEDULE SURGERY    Other intervertebral disc degeneration, lumbar region  -     DULoxetine (CYMBALTA) 60 mg capsule; Take 1 Cap by mouth daily. Indications: NEUROPATHIC PAIN  -     SCHEDULE SURGERY    Other intervertebral disc displacement, lumbar region  -     DULoxetine (CYMBALTA) 60 mg capsule; Take 1 Cap by mouth daily. Indications: NEUROPATHIC PAIN  -     SCHEDULE SURGERY          IMPRESSION AND PLAN:  The patient elects to proceed with lumbar blocks. I will order an L3/4 epidural. She was provided with refills of her Cymbalta 60 mg. I will see the patient back following block.        Written by Arthtena , as dictated by Rony Dias, MD  I examined the patient, reviewed and agree with the note.

## 2017-06-28 NOTE — MR AVS SNAPSHOT
Visit Information Date & Time Provider Department Dept. Phone Encounter #  
 6/28/2017  8:30 AM Caryn Soares MD [facility] [place] [phone] [id number] Follow-up Instructions Return for following block. Upcoming Health Maintenance Date Due Hepatitis C Screening 1960 DTaP/Tdap/Td series (1 - Tdap) 7/11/1981 PAP AKA CERVICAL CYTOLOGY 7/11/1981 BREAST CANCER SCRN MAMMOGRAM 7/11/2010 FOBT Q 1 YEAR AGE 50-75 7/11/2010 INFLUENZA AGE 9 TO ADULT 8/1/2017 Allergies as of 6/28/2017  Review Complete On: 6/28/2017 By: Caryn Soares MD  
  
 Severity Noted Reaction Type Reactions Actos [Pioglitazone]    Unable to Obtain Daypro [Oxaprozin]  10/15/2012    Unknown (comments) Lisinopril    Unable to Obtain Other Medication    Unable to Obtain Other allergies are: Antiinflammatories Algesics And several diabetic medications Topamax [Topiramate]  01/25/2017    Other (comments) Unknown reaction Zocor [Simvastatin]  01/07/2016    Seizures Current Immunizations  Never Reviewed No immunizations on file. Not reviewed this visit You Were Diagnosed With   
  
 Codes Comments Spinal stenosis, lumbar    -  Primary ICD-10-CM: M48.06 
ICD-9-CM: 724.02 Other intervertebral disc degeneration, lumbar region     ICD-10-CM: M51.36 
ICD-9-CM: 722.52 Other intervertebral disc displacement, lumbar region     ICD-10-CM: M51.26 
ICD-9-CM: 722.10 Vitals BP Pulse Resp Height(growth percentile) Weight(growth percentile) BMI  
 138/70 96 16 5' 7\" (1.702 m) 230 lb 3.2 oz (104.4 kg) 36.05 kg/m2 Smoking Status Never Smoker BMI and BSA Data Body Mass Index Body Surface Area 36.05 kg/m 2 2.22 m 2 Preferred Pharmacy Pharmacy Name Phone [address] Your Updated Medication List  
  
   
This list is accurate as of: 6/28/17  9:11 AM.  Always use your most recent med list.  
  
  
  
  
 clonazePAM 0.5 mg tablet Commonly known as:  KlonoPIN  
  
 CRESTOR 20 mg tablet Generic drug:  rosuvastatin DULoxetine 60 mg capsule Commonly known as:  CYMBALTA Take 1 Cap by mouth daily. Indications: NEUROPATHIC PAIN  
  
 FLEXERIL 10 mg tablet Generic drug:  cyclobenzaprine Take  by mouth three (3) times daily as needed for Muscle Spasm(s). HYDROcodone-acetaminophen 5-325 mg per tablet Commonly known as:  Sumit [place]nee Take 1 Tab by mouth every eight (8) hours as needed for Pain. Max Daily Amount: 3 Tabs. Indications: PAIN  
  
 LANTUS SOLOSTAR 100 unit/mL (3 mL) Inpn Generic drug:  insulin glargine  
  
 losartan 50 mg tab 100 mg, hydrochlorothiazide 25 mg tab 25 mg Take  by mouth daily. metFORMIN 1,000 mg tablet Commonly known as:  GLUCOPHAGE Take 1,000 mg by mouth two (2) times daily (with meals). NovoLOG Flexpen 100 unit/mL Inpn Generic drug:  insulin aspart  
by SubCUTAneous route. oxyCODONE-acetaminophen 5-325 mg per tablet Commonly known as:  PERCOCET  
take 1 to 2 tablets by mouth every 4 to 6 hours if needed for pain  
  
 traMADol 50 mg tablet Commonly known as:  ULTRAM  
Take 1 Tab by mouth daily. Max Daily Amount: 50 mg.  
  
 TYLENOL EXTRA STRENGTH PO Take  by mouth. Prescriptions Sent to Pharmacy Refills DULoxetine (CYMBALTA) 60 mg capsule 0 Sig: Take 1 Cap by mouth daily. Indications: NEUROPATHIC PAIN Class: Normal  
 Pharmacy: [address] [place] [address] #: [phone] Route: Oral  
  
Follow-up Instructions Return for following block. Introducing [facility]! Kamron Acosta introduces [employer] patient portal. Now you can access parts of your medical record, email your doctor's office, and request medication refills online. 1. In your internet browser, go to [email or web address] 2. Click on the First Time User? Click Here link in the Sign In box. You will see the New Member Sign Up page. 3. Enter your [employer] Access Code exactly as it appears below. You will not need to use this code after youve completed the sign-up process. If you do not sign up before the expiration date, you must request a new code. · [employer] Access Code: [email or web address] Expires: 9/26/2017  8:32 AM 
 
4. Enter the last four digits of your Social Security Number (xxxx) and Date of Birth (mm/dd/yyyy) as indicated and click Submit. You will be taken to the next sign-up page. 5. Create a TearSciencet ID. This will be your [employer] login ID and cannot be changed, so think of one that is secure and easy to remember. 6. Create a [employer] password. You can change your password at any time. 7. Enter your Password Reset Question and Answer. This can be used at a later time if you forget your password. 8. Enter your e-mail address. You will receive e-mail notification when new information is available in [address] 9. Click Sign Up. You can now view and download portions of your medical record. 10. Click the Download Summary menu link to download a portable copy of your medical information. If you have questions, please visit the Frequently Asked Questions section of the [employer] website. Remember, [employer] is NOT to be used for urgent needs. For medical emergencies, dial 911. Now available from your iPhone and Android! Please provide this summary of care documentation to your next provider. Your primary care clinician is listed as Trevor Vuong. If you have any questions after today's visit, please call [phone].

## 2017-08-23 ENCOUNTER — OFFICE VISIT (OUTPATIENT)
Dept: ORTHOPEDIC SURGERY | Age: 57
End: 2017-08-23

## 2017-08-23 VITALS
HEIGHT: 67 IN | DIASTOLIC BLOOD PRESSURE: 59 MMHG | BODY MASS INDEX: 35.79 KG/M2 | SYSTOLIC BLOOD PRESSURE: 130 MMHG | WEIGHT: 228 LBS | HEART RATE: 90 BPM

## 2017-08-23 DIAGNOSIS — M51.36 OTHER INTERVERTEBRAL DISC DEGENERATION, LUMBAR REGION: ICD-10-CM

## 2017-08-23 DIAGNOSIS — M48.061 LUMBAR SPINAL STENOSIS: Primary | ICD-10-CM

## 2017-08-23 DIAGNOSIS — M51.26 LUMBAR HERNIATED DISC: ICD-10-CM

## 2017-08-23 NOTE — MR AVS SNAPSHOT
Visit Information Date & Time Provider Department Dept. Phone Encounter #  
 8/23/2017  8:30 AM Alli Batres MD [facility] [place] [phone] [id number] Follow-up Instructions Return in about 3 months (around 11/23/2017). Upcoming Health Maintenance Date Due Hepatitis C Screening 1960 DTaP/Tdap/Td series (1 - Tdap) 7/11/1981 PAP AKA CERVICAL CYTOLOGY 7/11/1981 BREAST CANCER SCRN MAMMOGRAM 7/11/2010 FOBT Q 1 YEAR AGE 50-75 7/11/2010 INFLUENZA AGE 9 TO ADULT 8/1/2017 Allergies as of 8/23/2017  Review Complete On: 8/23/2017 By: Alli Batres MD  
  
 Severity Noted Reaction Type Reactions Actos [Pioglitazone]    Unable to Obtain Daypro [Oxaprozin]  10/15/2012    Unknown (comments) Lisinopril    Unable to Obtain Other Medication    Unable to Obtain Other allergies are: Antiinflammatories Algesics And several diabetic medications Topamax [Topiramate]  01/25/2017    Other (comments) Unknown reaction Zocor [Simvastatin]  01/07/2016    Seizures Current Immunizations  Never Reviewed No immunizations on file. Not reviewed this visit You Were Diagnosed With   
  
 Codes Comments Lumbar spinal stenosis    -  Primary ICD-10-CM: M48.06 
ICD-9-CM: 724.02 Lumbar herniated disc     ICD-10-CM: M51.26 
ICD-9-CM: 722.10 Other intervertebral disc degeneration, lumbar region     ICD-10-CM: M51.36 
ICD-9-CM: 722.52 Vitals BP Pulse Height(growth percentile) Weight(growth percentile) BMI Smoking Status 130/59 90 5' 7\" (1.702 m) 228 lb (103.4 kg) 35.71 kg/m2 Never Smoker Vitals History BMI and BSA Data Body Mass Index Body Surface Area 35.71 kg/m 2 2.21 m 2 Preferred Pharmacy Pharmacy Name Phone [address] Your Updated Medication List  
  
   
 This list is accurate as of: 8/23/17  9:00 AM.  Always use your most recent med list.  
  
  
  
  
 clonazePAM 0.5 mg tablet Commonly known as:  KlonoPIN  
  
 CRESTOR 20 mg tablet Generic drug:  rosuvastatin DULoxetine 60 mg capsule Commonly known as:  CYMBALTA Take 1 Cap by mouth daily. Indications: NEUROPATHIC PAIN  
  
 FLEXERIL 10 mg tablet Generic drug:  cyclobenzaprine Take  by mouth three (3) times daily as needed for Muscle Spasm(s). HYDROcodone-acetaminophen 5-325 mg per tablet Commonly known as:  Hurman Grist Take 1 Tab by mouth every eight (8) hours as needed for Pain. Max Daily Amount: 3 Tabs. Indications: PAIN  
  
 LANTUS SOLOSTAR 100 unit/mL (3 mL) Inpn Generic drug:  insulin glargine  
  
 losartan 50 mg tab 100 mg, hydrochlorothiazide 25 mg tab 25 mg Take  by mouth daily. metFORMIN 1,000 mg tablet Commonly known as:  GLUCOPHAGE Take 1,000 mg by mouth two (2) times daily (with meals). NovoLOG Flexpen 100 unit/mL Inpn Generic drug:  insulin aspart  
by SubCUTAneous route. oxyCODONE-acetaminophen 5-325 mg per tablet Commonly known as:  PERCOCET  
take 1 to 2 tablets by mouth every 4 to 6 hours if needed for pain  
  
 traMADol 50 mg tablet Commonly known as:  ULTRAM  
Take 1 Tab by mouth daily. Max Daily Amount: 50 mg.  
  
 TYLENOL EXTRA STRENGTH PO Take  by mouth. Follow-up Instructions Return in about 3 months (around 11/23/2017). Introducing [facility]! [employer] introduces [employer] patient portal. Now you can access parts of your medical record, email your doctor's office, and request medication refills online. 1. In your internet browser, go to [email or web address] 2. Click on the First Time User? Click Here link in the Sign In box. You will see the New Member Sign Up page. 3. Enter your [employer]orStyle Access Code exactly as it appears below.  You will not need to use this code after youve completed the sign-up process. If you do not sign up before the expiration date, you must request a new code. · Actimagine Access Code: [email or web address] Expires: 9/26/2017  8:32 AM 
 
4. Enter the last four digits of your Social Security Number (xxxx) and Date of Birth (mm/dd/yyyy) as indicated and click Submit. You will be taken to the next sign-up page. 5. Create a Actimagine ID. This will be your Actimagine login ID and cannot be changed, so think of one that is secure and easy to remember. 6. Create a Actimagine password. You can change your password at any time. 7. Enter your Password Reset Question and Answer. This can be used at a later time if you forget your password. 8. Enter your e-mail address. You will receive e-mail notification when new information is available in [address] 9. Click Sign Up. You can now view and download portions of your medical record. 10. Click the Download Summary menu link to download a portable copy of your medical information. If you have questions, please visit the Frequently Asked Questions section of the Actim[employer] website. Remember, Actimagine is NOT to be used for urgent needs. For medical emergencies, dial 911. Now available from your iPhone and Android! Please provide this summary of care documentation to your next provider. Your primary care clinician is listed as Raghavendra Kathleen. If you have any questions after today's visit, please call [phone].

## 2017-08-23 NOTE — PROGRESS NOTES
[facility]  [address], [address]  Phone: [phone]  Fax: [phone]        PROGRESS NOTE      HISTORY OF PRESENT ILLNESS:  The patient is a 62 y.o. female and was seen today for follow up of low back pain into the BLE extending in an S1 distribution to the knees. Pain is exacerbated by standing and ambulating. Symptoms consistent for stenosis. Per patient, she has a torn meniscus of the right knee which could possibly be contributing to her low back pain. Pt reports her baseline level of pain at 4/10. The patient additional had complaints of neck pain. Pt underwent L3/4 epidural from 1/31/17 reporting some relief but notes prior block offered better relief. Pt failed NEURONTIN and TOPAMAX. Pt is compliant with Cymbalta 30 mg per day. She reports rare use of Tramadol. Pt was prescribed Klonopin for insomnia but states she has discontinued use. Pt is completing her HEP daily. She has hx of DM and reports monitoring her blood sugars 3x/day. She denies hx of glaucoma. At that time, patient was doing well. Lumbar spine MRI per report revealed degenerative disc disease at L3-L4, L4-L5 and L5-S1 with severe spinal canal stenosis at L4-L5 secondary to a disc protrusion and degenerative facet changes. Additionally, the patient had complaints of neck pain. Upper extremity EMG per report revealed evidence of moderately severe carpal tunnel syndrome bilaterally, worse on the right. Cervical spine MRI per report revealed degenerative disc disease at multiple levels with foraminal encroachment and uncovertebral joint spurring. At her last clinic appointment, the patient elected to proceed with lumbar blocks. I ordered an L3/4 epidural. She was provided with refills of her Cymbalta 60 mg. The patient returns today stating she is symptom-free. She rates pain 0/10. Pt underwent L3/4 epidural on 7/25/17 and states she is pain-free.  She is compliant with Cymbalta 60 mg per day. Pt performs her HEP daily.  reviewed. Past Medical History:   Diagnosis Date    Arthritis     Cervical radiculopathy     Diabetes ([facility] [address]     Hypercholesteremia     Hypertension         Social History     Social History    Marital status:      Spouse name: N/A    Number of children: N/A    Years of education: N/A     Occupational History    Not on file. Social History Main Topics    Smoking status: Never Smoker    Smokeless tobacco: Never Used    Alcohol use 0.5 oz/week     1 Glasses of wine per week    Drug use: No    Sexual activity: Not on file     Other Topics Concern    Not on file     Social History Narrative       Current Outpatient Prescriptions   Medication Sig Dispense Refill    insulin aspart (NOVOLOG FLEXPEN) 100 unit/mL inpn by SubCUTAneous route.  DULoxetine (CYMBALTA) 60 mg capsule Take 1 Cap by mouth daily. Indications: NEUROPATHIC PAIN 90 Cap 0    HYDROcodone-acetaminophen (NORCO) 5-325 mg per tablet Take 1 Tab by mouth every eight (8) hours as needed for Pain. Max Daily Amount: 3 Tabs. Indications: PAIN 30 Tab 0    traMADol (ULTRAM) 50 mg tablet Take 1 Tab by mouth daily. Max Daily Amount: 50 mg. 30 Tab 0    LANTUS SOLOSTAR 100 unit/mL (3 mL) pen       CRESTOR 20 mg tablet       metFORMIN (GLUCOPHAGE) 1,000 mg tablet Take 1,000 mg by mouth two (2) times daily (with meals).  losartan 50 mg tab 100 mg, hydrochlorothiazide 25 mg tab 25 mg Take  by mouth daily.  ACETAMINOPHEN (TYLENOL EXTRA STRENGTH PO) Take  by mouth.  oxyCODONE-acetaminophen (PERCOCET) 5-325 mg per tablet take 1 to 2 tablets by mouth every 4 to 6 hours if needed for pain  0    clonazePAM (KLONOPIN) 0.5 mg tablet       cyclobenzaprine (FLEXERIL) 10 mg tablet Take  by mouth three (3) times daily as needed for Muscle Spasm(s).          Allergies   Allergen Reactions    Actos [Pioglitazone] Unable to Obtain    Daypro [Oxaprozin] Unknown (comments)    Lisinopril Unable to Obtain    Other Medication Unable to Obtain     Other allergies are:  Antiinflammatories  Algesics  And several diabetic medications    Topamax [Topiramate] Other (comments)     Unknown reaction    Zocor [Simvastatin] Seizures          PHYSICAL EXAMINATION    Visit Vitals    /59    Pulse 90    Ht 5' 7\" (1.702 m)    Wt 228 lb (103.4 kg)    BMI 35.71 kg/m2       CONSTITUTIONAL: NAD, A&O x 3  SENSATION: Intact to light touch throughout  RANGE OF MOTION: The patient has full passive range of motion in all four extremities. MOTOR:  Straight Leg Raise: Negative, bilateral               Hip Flex Knee Ext Knee Flex Ankle DF GTE Ankle PF Tone   Right +4/5 +4/5 +4/5 +4/5 +4/5 +4/5 +4/5   Left +4/5 +4/5 +4/5 +4/5 +4/5 +4/5 +4/5       ASSESSMENT   Diagnoses and all orders for this visit:    1. Lumbar spinal stenosis    2. Lumbar herniated disc    3. Other intervertebral disc degeneration, lumbar region          IMPRESSION AND PLAN:  The patient is s/p L3/4 epidural from 7/25/17 reporting she is pain-free. she does not need refills of her Cymbalta 60 mg at this time. I encourage patient to perform her HEP daily. I will see the patient back in 3 month's time or earlier if needed. Written by Kristy Williamson, as dictated by Magdalene Martino MD  I examined the patient, reviewed and agree with the note.

## 2017-08-28 DIAGNOSIS — M51.26 OTHER INTERVERTEBRAL DISC DISPLACEMENT, LUMBAR REGION: ICD-10-CM

## 2017-08-28 DIAGNOSIS — M51.36 OTHER INTERVERTEBRAL DISC DEGENERATION, LUMBAR REGION: ICD-10-CM

## 2017-08-28 DIAGNOSIS — M48.061 SPINAL STENOSIS, LUMBAR: ICD-10-CM

## 2017-08-28 RX ORDER — DULOXETIN HYDROCHLORIDE 60 MG/1
60 CAPSULE, DELAYED RELEASE ORAL DAILY
Qty: 90 CAP | Refills: 0 | Status: SHIPPED | OUTPATIENT
Start: 2017-08-28 | End: 2018-09-11 | Stop reason: SDUPTHER

## 2017-08-28 NOTE — TELEPHONE ENCOUNTER
Patient calling to ensure she has enough medication to get her through the next office visit. Last Visit: 08/23/2017 with MD Sofie Siemens    Next Appointment: 11/29/2017 with MD Sofie Siemens   Previous Refill Encounters: 06/28/2017 per MD Sofie Siemens #[id number]     Requested Prescriptions     Pending Prescriptions Disp Refills    DULoxetine (CYMBALTA) 60 mg capsule 90 Cap 0     Sig: Take 1 Cap by mouth daily.  Indications: NEUROPATHIC PAIN

## 2017-11-29 ENCOUNTER — OFFICE VISIT (OUTPATIENT)
Dept: ORTHOPEDIC SURGERY | Age: 57
End: 2017-11-29

## 2017-11-29 VITALS
RESPIRATION RATE: 18 BRPM | DIASTOLIC BLOOD PRESSURE: 75 MMHG | HEART RATE: 82 BPM | WEIGHT: 232 LBS | SYSTOLIC BLOOD PRESSURE: 145 MMHG | BODY MASS INDEX: 36.34 KG/M2

## 2017-11-29 DIAGNOSIS — M48.062 SPINAL STENOSIS OF LUMBAR REGION WITH NEUROGENIC CLAUDICATION: Primary | ICD-10-CM

## 2017-11-29 DIAGNOSIS — M51.36 OTHER INTERVERTEBRAL DISC DEGENERATION, LUMBAR REGION: ICD-10-CM

## 2017-11-29 DIAGNOSIS — M51.26 OTHER INTERVERTEBRAL DISC DISPLACEMENT, LUMBAR REGION: ICD-10-CM

## 2017-11-29 RX ORDER — DULOXETIN HYDROCHLORIDE 60 MG/1
60 CAPSULE, DELAYED RELEASE ORAL DAILY
Qty: 90 CAP | Refills: 0 | Status: SHIPPED | OUTPATIENT
Start: 2017-11-29 | End: 2018-06-27 | Stop reason: SDUPTHER

## 2017-11-29 NOTE — PROGRESS NOTES
[facility]  [address], [address]  Phone: [phone]  Fax: [phone]        PROGRESS NOTE      HISTORY OF PRESENT ILLNESS:  The patient is a 62 y.o. female and was seen today for follow up of low back pain into the BLE extending in an S1 distribution to the knees. Pain is exacerbated by standing and ambulating. Symptoms consistent for stenosis. Per patient, she has a torn meniscus of the right knee which could possibly be contributing to her low back pain. Pt reports her baseline level of pain at 4/10. The patient additional had complaints of neck pain. Pt underwent L3/4 epidural from 1/31/17 reporting some relief but notes prior block offered better relief. Pt underwent L3/4 epidural on 7/25/17 and reported she was symptom-free. Pt failed NEURONTIN and TOPAMAX. Pt is compliant with Cymbalta 30 mg per day. She reports rare use of Tramadol. Pt was prescribed Klonopin for insomnia but states she has discontinued use. Pt is completing her HEP daily. She has hx of DM and reports monitoring her blood sugars 3x/day. She denies hx of glaucoma. At that time, patient was doing well. Lumbar spine MRI per report revealed degenerative disc disease at L3-L4, L4-L5 and L5-S1 with severe spinal canal stenosis at L4-L5 secondary to a disc protrusion and degenerative facet changes. Additionally, the patient had complaints of neck pain. Upper extremity EMG per report revealed evidence of moderately severe carpal tunnel syndrome bilaterally, worse on the right. Cervical spine MRI per report revealed degenerative disc disease at multiple levels with foraminal encroachment and uncovertebral joint spurring. At her last clinic appointment, the patient was s/p L3/4 epidural from 7/25/17 reporting she was pain-free. She did not need refills of her Cymbalta 60 mg at that time. I encouraged patient to perform her HEP daily.      The patient returns today and continues to be symptom-free. She continues to rate pain 0/10. Pt is compliant with Cymbalta 60 mg per day. She performs her HEP daily.  reviewed. Body mass index is 36.34 kg/(m^2). Past Medical History:   Diagnosis Date    Arthritis     Cervical radiculopathy     Diabetes ([address]     Hypercholesteremia     Hypertension         Social History     Social History    Marital status:      Spouse name: N/A    Number of children: N/A    Years of education: N/A     Occupational History    Not on file. Social History Main Topics    Smoking status: Never Smoker    Smokeless tobacco: Never Used    Alcohol use 0.5 oz/week     1 Glasses of wine per week    Drug use: No    Sexual activity: Not on file     Other Topics Concern    Not on file     Social History Narrative       Current Outpatient Prescriptions   Medication Sig Dispense Refill    DULoxetine (CYMBALTA) 60 mg capsule Take 1 Cap by mouth daily. 90 Cap 0    DULoxetine (CYMBALTA) 60 mg capsule Take 1 Cap by mouth daily. Indications: NEUROPATHIC PAIN 90 Cap 0    insulin aspart (NOVOLOG FLEXPEN) 100 unit/mL inpn by SubCUTAneous route.  LANTUS SOLOSTAR 100 unit/mL (3 mL) pen       CRESTOR 20 mg tablet       metFORMIN (GLUCOPHAGE) 1,000 mg tablet Take 1,000 mg by mouth two (2) times daily (with meals).  losartan 50 mg tab 100 mg, hydrochlorothiazide 25 mg tab 25 mg Take  by mouth daily.  ACETAMINOPHEN (TYLENOL EXTRA STRENGTH PO) Take  by mouth.  oxyCODONE-acetaminophen (PERCOCET) 5-325 mg per tablet take 1 to 2 tablets by mouth every 4 to 6 hours if needed for pain  0    HYDROcodone-acetaminophen (NORCO) 5-325 mg per tablet Take 1 Tab by mouth every eight (8) hours as needed for Pain. Max Daily Amount: 3 Tabs. Indications: PAIN 30 Tab 0    traMADol (ULTRAM) 50 mg tablet Take 1 Tab by mouth daily.  Max Daily Amount: 50 mg. 30 Tab 0    clonazePAM (KLONOPIN) 0.5 mg tablet       cyclobenzaprine (FLEXERIL) 10 mg tablet Take  by mouth three (3) times daily as needed for Muscle Spasm(s). Allergies   Allergen Reactions    Actos [Pioglitazone] Unable to Obtain    Daypro [Oxaprozin] Unknown (comments)    Lisinopril Unable to Obtain    Other Medication Unable to Obtain     Other allergies are:  Antiinflammatories  Algesics  And several diabetic medications    Topamax [Topiramate] Other (comments)     Unknown reaction    Zocor [Simvastatin] Seizures          PHYSICAL EXAMINATION    Visit Vitals    /75    Pulse 82    Resp 18    Wt 232 lb (105.2 kg)    BMI 36.34 kg/m2       CONSTITUTIONAL: NAD, A&O x 3  SENSATION: Intact to light touch throughout  RANGE OF MOTION: The patient has full passive range of motion in all four extremities. MOTOR:  Straight Leg Raise: Negative, bilateral               Hip Flex Knee Ext Knee Flex Ankle DF GTE Ankle PF Tone   Right +4/5 +4/5 +4/5 +4/5 +4/5 +4/5 +4/5   Left +4/5 +4/5 +4/5 +4/5 +4/5 +4/5 +4/5       ASSESSMENT   Diagnoses and all orders for this visit:    1. Spinal stenosis of lumbar region with neurogenic claudication    2. Other intervertebral disc degeneration, lumbar region    3. Other intervertebral disc displacement, lumbar region    Other orders  -     DULoxetine (CYMBALTA) 60 mg capsule; Take 1 Cap by mouth daily. IMPRESSION AND PLAN:  The patient continues to do well. She will attempt to decrease her Cymbalta to 30 mg per day. Patient has been instructed to notify our office if her pain returns with the reduction in her Cymbalta dose. I encourage patient to perform her HEP daily. I will see the patient back in 3 month's time or earlier if needed. Written by Loraine Mayorga, as dictated by Oksana Ross MD  I examined the patient, reviewed and agree with the note.

## 2017-11-29 NOTE — MR AVS SNAPSHOT
Visit Information Date & Time Provider Department Dept. Phone Encounter #  
 11/29/2017  8:35 AM Jennifer Carrington MD [facility] [address] [facility] - [place] [address] [phone] [id number] Follow-up Instructions Return in about 3 months (around 2/28/2018). Upcoming Health Maintenance Date Due Hepatitis C Screening 1960 DTaP/Tdap/Td series (1 - Tdap) 7/11/1981 PAP AKA CERVICAL CYTOLOGY 7/11/1981 BREAST CANCER SCRN MAMMOGRAM 7/11/2010 FOBT Q 1 YEAR AGE 50-75 7/11/2010 Influenza Age 5 to Adult 8/1/2017 Allergies as of 11/29/2017  Review Complete On: 11/29/2017 By: Jennifer Carrington MD  
  
 Severity Noted Reaction Type Reactions Actos [Pioglitazone]    Unable to Obtain Daypro [Oxaprozin]  10/15/2012    Unknown (comments) Lisinopril    Unable to Obtain Other Medication    Unable to Obtain Other allergies are: Antiinflammatories Algesics And several diabetic medications Topamax [Topiramate]  01/25/2017    Other (comments) Unknown reaction Zocor [Simvastatin]  01/07/2016    Seizures Current Immunizations  Never Reviewed No immunizations on file. Not reviewed this visit You Were Diagnosed With   
  
 Codes Comments Spinal stenosis of lumbar region with neurogenic claudication    -  Primary ICD-10-CM: G52.559 
ICD-9-CM: 724.03 Other intervertebral disc degeneration, lumbar region     ICD-10-CM: M51.36 
ICD-9-CM: 722.52 Other intervertebral disc displacement, lumbar region     ICD-10-CM: M51.26 
ICD-9-CM: 722.10 Vitals BP Pulse Resp Weight(growth percentile) BMI Smoking Status 145/75 82 18 232 lb (105.2 kg) 36.34 kg/m2 Never Smoker BMI and BSA Data Body Mass Index Body Surface Area  
 36.34 kg/m 2 2.23 m 2 Preferred Pharmacy Pharmacy Name Phone [facility] [address] Your Updated Medication List  
  
   
 This list is accurate as of: 11/29/17  8:59 AM.  Always use your most recent med list.  
  
  
  
  
 clonazePAM 0.5 mg tablet Commonly known as:  KlonoPIN  
  
 CRESTOR 20 mg tablet Generic drug:  rosuvastatin * DULoxetine 60 mg capsule Commonly known as:  CYMBALTA Take 1 Cap by mouth daily. Indications: NEUROPATHIC PAIN  
  
 * DULoxetine 60 mg capsule Commonly known as:  CYMBALTA Take 1 Cap by mouth daily. FLEXERIL 10 mg tablet Generic drug:  cyclobenzaprine Take  by mouth three (3) times daily as needed for Muscle Spasm(s). HYDROcodone-acetaminophen 5-325 mg per tablet Commonly known as:  [place] Take 1 Tab by mouth every eight (8) hours as needed for Pain. Max Daily Amount: 3 Tabs. Indications: PAIN  
  
 LANTUS SOLOSTAR 100 unit/mL (3 mL) Inpn Generic drug:  insulin glargine  
  
 losartan 50 mg tab 100 mg, hydrochlorothiazide 25 mg tab 25 mg Take  by mouth daily. metFORMIN 1,000 mg tablet Commonly known as:  GLUCOPHAGE Take 1,000 mg by mouth two (2) times daily (with meals). NovoLOG Flexpen 100 unit/mL Inpn Generic drug:  insulin aspart  
by SubCUTAneous route. oxyCODONE-acetaminophen 5-325 mg per tablet Commonly known as:  PERCOCET  
take 1 to 2 tablets by mouth every 4 to 6 hours if needed for pain  
  
 traMADol 50 mg tablet Commonly known as:  ULTRAM  
Take 1 Tab by mouth daily. Max Daily Amount: 50 mg.  
  
 TYLENOL EXTRA STRENGTH PO Take  by mouth. * Notice: This list has 2 medication(s) that are the same as other medications prescribed for you. Read the directions carefully, and ask your doctor or other care provider to review them with you. Prescriptions Sent to Pharmacy Refills DULoxetine (CYMBALTA) 60 mg capsule 0 Sig: Take 1 Cap by mouth daily. Class: Normal  
 Pharmacy: [address] [place] [address] #: [phone] Route: Oral  
  
Follow-up Instructions Return in about 3 months (around 2/28/2018). Introducing [facility]! [employer] introduces [employer] patient portal. Now you can access parts of your medical record, email your doctor's office, and request medication refills online. 1. In your internet browser, go to [email or web address] 2. Click on the First Time User? Click Here link in the Sign In box. You will see the New Member Sign Up page. 3. Enter your Make Meaning Access Code exactly as it appears below. You will not need to use this code after youve completed the sign-up process. If you do not sign up before the expiration date, you must request a new code. · [employer] Access Code: [email or web address] Expires: 2/27/2018  8:26 AM 
 
4. Enter the last four digits of your Social Security Number (xxxx) and Date of Birth (mm/dd/yyyy) as indicated and click Submit. You will be taken to the next sign-up page. 5. Create a Make Meaning ID. This will be your [employer] Meaning login ID and cannot be changed, so think of one that is secure and easy to remember. 6. Create a [employer] password. You can change your password at any time. 7. Enter your Password Reset Question and Answer. This can be used at a later time if you forget your password. 8. Enter your e-mail address. You will receive e-mail notification when new information is available in [address] 9. Click Sign Up. You can now view and download portions of your medical record. 10. Click the Download Summary menu link to download a portable copy of your medical information. If you have questions, please visit the Frequently Asked Questions section of the [employer] Meaning website. Remember, [employer] is NOT to be used for urgent needs. For medical emergencies, dial 911. Now available from your iPhone and Android! Please provide this summary of care documentation to your next provider. Your primary care clinician is listed as Lucho Franco.  If you have any questions after today's visit, please call [phone].

## 2018-02-28 ENCOUNTER — OFFICE VISIT (OUTPATIENT)
Dept: ORTHOPEDIC SURGERY | Age: 58
End: 2018-02-28

## 2018-02-28 VITALS — WEIGHT: 236.8 LBS | BODY MASS INDEX: 37.17 KG/M2 | RESPIRATION RATE: 14 BRPM | HEIGHT: 67 IN

## 2018-02-28 DIAGNOSIS — M51.36 DDD (DEGENERATIVE DISC DISEASE), LUMBAR: ICD-10-CM

## 2018-02-28 DIAGNOSIS — M48.062 SPINAL STENOSIS OF LUMBAR REGION WITH NEUROGENIC CLAUDICATION: Primary | ICD-10-CM

## 2018-02-28 RX ORDER — DULOXETIN HYDROCHLORIDE 30 MG/1
30 CAPSULE, DELAYED RELEASE ORAL DAILY
Qty: 90 CAP | Refills: 1 | Status: SHIPPED | OUTPATIENT
Start: 2018-02-28 | End: 2021-01-26 | Stop reason: SDUPTHER

## 2018-02-28 NOTE — PROGRESS NOTES
Chief complaint/History of Present Illness:  Chief Complaint   Patient presents with    Back Pain     lower    Follow-up     3 month f/u     MARGO  Cleve Salas is a  62 y.o.  female      HISTORY OF PRESENT ILLNESS:  The patient comes in today for follow up of her low back pain with bilateral lower extremity pain due to stenosis and degenerative disc disease. She states she is not having any pain in her bilateral lower extremities. The Cymbalta. She states she is not having any pain in her bilateral lower extremities. The Cymbalta 60 mg daily helps with that. She is able to walk in marathons such as 39 miles with the breast cancer program.  She is also starting to take a kick boxing class. She is doing well with these activities. She would like to go back down to 30 mg. It looks like Dr. Richa Cox intended her to go down to 30 mg but gave her the 60 mg, so she has been taking the 60 mg. She does have a right knee issue. She has a torn meniscus. We will be getting a scope done on that knee March 6, 2018. She has had lumbar epidurals in the past at L3-4. The first was January 25, 2017, which did not help, but she had a repeat on June 28, 2017, which helped a lot. She has not had leg pain since then. She denies fever and bowel and bladder dysfunction. 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 She is a nonsmoker. PHYSICAL EXAM:  Ms. Olga Crespo is a 59-year-old female. She is alert and oriented. She is alert and oriented. She has a normal mood and affect. She has a full weightbearing, non-antalgic gait. She has 5/5 strength of the bilateral lower extremities and negative straight leg raise. She has no pain with hyperextension of the lumbar spine. ASSESSENT/PLAN:  This is a patient with lumbar spinal stenosis and degenerative disc disease. We will decrease her Cymbalta to 30 mg daily. I have sent that prescription to her pharmacy.   We will see her back in six months or sooner if needed. If by going down to the 30 mg her pain returns, she may increase back up to the 60 mg. She will call us for a new prescription if she needs it. Review of systems:    Past Medical History:   Diagnosis Date    Arthritis     Cervical radiculopathy     Diabetes ([address]     Hypercholesteremia     Hypertension      Past Surgical History:   Procedure Laterality Date    HX BREAST LUMPECTOMY      HX HYSTERECTOMY      HX OTHER SURGICAL Left     wrist tendon release    HX TONSILLECTOMY       Social History     Social History    Marital status:      Spouse name: N/A    Number of children: N/A    Years of education: N/A     Occupational History    Not on file. Social History Main Topics    Smoking status: Never Smoker    Smokeless tobacco: Never Used    Alcohol use 0.5 oz/week     1 Glasses of wine per week    Drug use: No    Sexual activity: Not on file     Other Topics Concern    Not on file     Social History Narrative     Family History   Problem Relation Age of Onset    Cancer Other     Diabetes Other     Other Other      Connective tissue disorder       Physical Exam:  Visit Vitals    Resp 14    Ht 5' 7\" (1.702 m)    Wt 236 lb 12.8 oz (107.4 kg)    BMI 37.09 kg/m2     Pain Scale: 0 - No pain/10          has been . reviewed and is appropriate          Diagnoses and all orders for this visit:    1. Spinal stenosis of lumbar region with neurogenic claudication    2. DDD (degenerative disc disease), lumbar    Other orders  -     DULoxetine (CYMBALTA) 30 mg capsule; Take 1 Cap by mouth daily. Indications: NEUROPATHIC PAIN            Follow-up Disposition:  Return in about 6 months (around 8/28/2018) for with [address] [place] [address].         We have informed Gisselle Linares to notify us for immediate appointment if she has any worsening neurogical symptoms or if an emergency situation presents, then call 911

## 2018-02-28 NOTE — PATIENT INSTRUCTIONS

## 2018-06-27 ENCOUNTER — OFFICE VISIT (OUTPATIENT)
Dept: ORTHOPEDIC SURGERY | Age: 58
End: 2018-06-27

## 2018-06-27 VITALS
BODY MASS INDEX: 35.97 KG/M2 | WEIGHT: 229.2 LBS | DIASTOLIC BLOOD PRESSURE: 74 MMHG | SYSTOLIC BLOOD PRESSURE: 134 MMHG | RESPIRATION RATE: 16 BRPM | HEART RATE: 79 BPM | HEIGHT: 67 IN

## 2018-06-27 DIAGNOSIS — M51.36 DDD (DEGENERATIVE DISC DISEASE), LUMBAR: ICD-10-CM

## 2018-06-27 DIAGNOSIS — M48.061 SPINAL STENOSIS OF LUMBAR REGION WITHOUT NEUROGENIC CLAUDICATION: Primary | ICD-10-CM

## 2018-06-27 RX ORDER — METHYLPREDNISOLONE 4 MG/1
TABLET ORAL
Qty: 1 DOSE PACK | Refills: 0 | Status: SHIPPED | OUTPATIENT
Start: 2018-06-27 | End: 2018-08-20 | Stop reason: ALTCHOICE

## 2018-06-27 RX ORDER — GLUCOSAMINE SULFATE 1500 MG
1000 POWDER IN PACKET (EA) ORAL DAILY
COMMUNITY

## 2018-06-27 RX ORDER — DULOXETIN HYDROCHLORIDE 60 MG/1
60 CAPSULE, DELAYED RELEASE ORAL DAILY
Qty: 90 CAP | Refills: 1 | Status: SHIPPED | OUTPATIENT
Start: 2018-06-27 | End: 2019-01-21 | Stop reason: SDUPTHER

## 2018-06-27 RX ORDER — BISMUTH SUBSALICYLATE 262 MG
1 TABLET,CHEWABLE ORAL DAILY
COMMUNITY

## 2018-06-27 NOTE — PROGRESS NOTES
Chief complaint/History of Present Illness:  Chief Complaint   Patient presents with    Back Pain    Hip Pain     HPI  Sofia Richard is a  62 y.o.  female      HISTORY OF PRESENT ILLNESS:  The patient comes in today for followup of her low back pain with bilateral lower extremity pain which she states now is mostly just bilateral buttock pain. She was on Cymbalta 60 mg daily. She was doing so well she decreased it down to 30 mg daily. She went to [facility] in 05/2018, had a wonderful vacation and did well there. She had a little bit of soreness with all the walking, but after she got home she had a flare of pain that shot up to an 8/10 without injury. So, she increased her Cymbalta back up to 60 mg daily; that has helped some. She denies fever or bowel or bladder dysfunction. 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 She is a nonsmoker. PHYSICAL EXAMINATION:  Ms. John Lundberg is a 57-year-old female. She is alert and oriented. Normal mood and affect. She has a full-weightbearing nonantalgic gait. No assistive device. She has 4/5 strength of bilateral lower extremities. Negative straight leg raise. No pain with hyperextension of the lumbar spine. ASSESSMENT/PLAN:  This is a patient with spinal stenosis, lumbar, and degenerative disc disease. She is having a little flare of pain. We are going to give her a Medrol Dosepak to calm that down. She knows not to take other antiinflammatories with it and take it with food. We will also continue her Cymbalta 60 mg daily. Hopefully, this will settle everything down. We will see her back in 6 months, sooner if needed.           Review of systems:    Past Medical History:   Diagnosis Date    Arthritis     Cervical radiculopathy     Diabetes ([facility] [address]     Hypercholesteremia     Hypertension      Past Surgical History:   Procedure Laterality Date    HX BREAST LUMPECTOMY      HX HYSTERECTOMY      HX OTHER SURGICAL Left     wrist tendon release    HX TONSILLECTOMY       Social History     Social History    Marital status:      Spouse name: N/A    Number of children: N/A    Years of education: N/A     Occupational History    Not on file. Social History Main Topics    Smoking status: Never Smoker    Smokeless tobacco: Never Used    Alcohol use 0.5 oz/week     1 Glasses of wine per week    Drug use: No    Sexual activity: Not on file     Other Topics Concern    Not on file     Social History Narrative     Family History   Problem Relation Age of Onset    Cancer Other     Diabetes Other     Other Other      Connective tissue disorder       Physical Exam:  Visit Vitals    /74    Pulse 79    Resp 16    Ht 5' 7\" (1.702 m)    Wt 229 lb 3.2 oz (104 kg)    BMI 35.9 kg/m2     Pain Scale: 4/10          has been . reviewed and is appropriate          Diagnoses and all orders for this visit:    1. Spinal stenosis of lumbar region without neurogenic claudication    2. DDD (degenerative disc disease), lumbar    Other orders  -     DULoxetine (CYMBALTA) 60 mg capsule; Take 1 Cap by mouth daily. Indications: CHRONIC MUSCULOSKELETAL PAIN, NEUROPATHIC PAIN  -     methylPREDNISolone (MEDROL DOSEPACK) 4 mg tablet; Per dose pack instructions            Follow-up Disposition:  Return in about 6 months (around 12/27/2018) for with Raquel.         We have informed Ju Aranda to notify us for immediate appointment if she has any worsening neurogical symptoms or if an emergency situation presents, then call 911

## 2018-06-27 NOTE — LETTER
6/27/2018 9:24 AM 
 
Ms. Maki Wu [address] [facility] [address] Ms. Elva Barrow is a patient at the [address] [place] [address] and suffers from lumbar degenerative disc disease and spinal stenosis that gives her chronic back and radiating lower extremity pain. Sitting for prolonged periods exacerbates her pain which interferes with her functioning. I would request that she be excused from jury duty. Sincerely, Desiree Vasques NP

## 2018-06-27 NOTE — PATIENT INSTRUCTIONS
Learning About How to Have a Healthy Back  What causes back pain? Back pain is often caused by overuse, strain, or injury. For example, people often hurt their backs playing sports or working in the yard, being jolted in a car accident, or lifting something too heavy. Aging plays a part too. Your bones and muscles tend to lose strength as you age, which makes injury more likely. The spongy discs between the bones of the spine (vertebrae) may suffer from wear and tear and no longer provide enough cushion between the bones. A disc that bulges or breaks open (herniated disc) can press on nerves, causing back pain. In some people, back pain is the result of arthritis, broken vertebrae caused by bone loss (osteoporosis), illness, or a spine problem. Although most people have back pain at one time or another, there are steps you can take to make it less likely. How can you have a healthy back? Reduce stress on your back through good posture  Slumping or slouching alone may not cause low back pain. But after the back has been strained or injured, bad posture can make pain worse. · Sleep in a position that maintains your back's normal curves and on a mattress that feels comfortable. Sleep on your side with a pillow between your knees, or sleep on your back with a pillow under your knees. These positions can reduce strain on your back. · Stand and sit up straight. \"Good posture\" generally means your ears, shoulders, and hips are in a straight line. · If you must stand for a long time, put one foot on a stool, ledge, or box. Switch feet every now and then. · Sit in a chair that is low enough to let you place both feet flat on the floor with both knees nearly level with your hips. If your chair or desk is too high, use a footrest to raise your knees. Place a small pillow, a rolled-up towel, or a lumbar roll in the curve of your back if you need extra support.   · Try a kneeling chair, which helps tilt your hips forward. This takes pressure off your lower back. · Try sitting on an exercise ball. It can rock from side to side, which helps keep your back loose. · When driving, keep your knees nearly level with your hips. Sit straight, and drive with both hands on the steering wheel. Your arms should be in a slightly bent position. Reduce stress on your back through careful lifting  · Squat down, bending at the hips and knees only. If you need to, put one knee to the floor and extend your other knee in front of you, bent at a right angle (half kneeling). · Press your chest straight forward. This helps keep your upper back straight while keeping a slight arch in your low back. · Hold the load as close to your body as possible, at the level of your belly button (navel). · Use your feet to change direction, taking small steps. · Lead with your hips as you change direction. Keep your shoulders in line with your hips as you move. · Set down your load carefully, squatting with your knees and hips only. Exercise and stretch your back  · Do some exercise on most days of the week, if your doctor says it is okay. You can walk, run, swim, or cycle. · Stretch your back muscles. Here are a few exercises to try:  Mozella Catrachita on your back, and gently pull one bent knee to your chest. Put that foot back on the floor, and then pull the other knee to your chest.  ¨ Do pelvic tilts. Lie on your back with your knees bent. Tighten your stomach muscles. Pull your belly button (navel) in and up toward your ribs. You should feel like your back is pressing to the floor and your hips and pelvis are slightly lifting off the floor. Hold for 6 seconds while breathing smoothly. ¨ Sit with your back flat against a wall. · Keep your core muscles strong. The muscles of your back, belly (abdomen), and buttocks support your spine. ¨ Pull in your belly and imagine pulling your navel toward your spine. Hold this for 6 seconds, then relax.  Remember to keep breathing normally as you tense your muscles. ¨ Do curl-ups. Always do them with your knees bent. Keep your low back on the floor, and curl your shoulders toward your knees using a smooth, slow motion. Keep your arms folded across your chest. If this bothers your neck, try putting your hands behind your neck (not your head), with your elbows spread apart. ¨ Lie on your back with your knees bent and your feet flat on the floor. Tighten your belly muscles, and then push with your feet and raise your buttocks up a few inches. Hold this position 6 seconds as you continue to breathe normally, then lower yourself slowly to the floor. Repeat 8 to 12 times. ¨ If you like group exercise, try Pilates or yoga. These classes have poses that strengthen the core muscles. Lead a healthy lifestyle  · Stay at a healthy weight to avoid strain on your back. · Do not smoke. Smoking increases the risk of osteoporosis, which weakens the spine. If you need help quitting, talk to your doctor about stop-smoking programs and medicines. These can increase your chances of quitting for good. Where can you learn more? Go to http://tamera-mattie.info/. Enter L315 in the search box to learn more about \"Learning About How to Have a Healthy Back. \"  Current as of: March 21, 2017  Content Version: 11.4  © 6396-4183 [employer]. Care instructions adapted under license by [employer] (which disclaims liability or warranty for this information). If you have questions about a medical condition or this instruction, always ask your healthcare professional. [place] [address] any warranty or liability for your use of this information.

## 2018-08-20 ENCOUNTER — TELEPHONE (OUTPATIENT)
Dept: ORTHOPEDIC SURGERY | Age: 58
End: 2018-08-20

## 2018-08-20 ENCOUNTER — OFFICE VISIT (OUTPATIENT)
Dept: ORTHOPEDIC SURGERY | Age: 58
End: 2018-08-20

## 2018-08-20 VITALS
DIASTOLIC BLOOD PRESSURE: 64 MMHG | HEIGHT: 67 IN | BODY MASS INDEX: 36.26 KG/M2 | WEIGHT: 231 LBS | RESPIRATION RATE: 16 BRPM | SYSTOLIC BLOOD PRESSURE: 144 MMHG | HEART RATE: 94 BPM

## 2018-08-20 DIAGNOSIS — M48.061 SPINAL STENOSIS OF LUMBAR REGION WITHOUT NEUROGENIC CLAUDICATION: Primary | ICD-10-CM

## 2018-08-20 DIAGNOSIS — F41.8 TEST ANXIETY: Primary | ICD-10-CM

## 2018-08-20 DIAGNOSIS — M51.36 DDD (DEGENERATIVE DISC DISEASE), LUMBAR: ICD-10-CM

## 2018-08-20 DIAGNOSIS — E66.01 SEVERE OBESITY (BMI 35.0-39.9): ICD-10-CM

## 2018-08-20 RX ORDER — DIAZEPAM 10 MG/1
TABLET ORAL
Qty: 1 TAB | Refills: 0 | OUTPATIENT
Start: 2018-08-20 | End: 2018-09-11 | Stop reason: ALTCHOICE

## 2018-08-20 NOTE — TELEPHONE ENCOUNTER
Per his note,  I offered to try her on another neuropathic pain medication but she declined. We will not be prescribing pain medications at this time due to state regulations.

## 2018-08-20 NOTE — TELEPHONE ENCOUNTER
Pt was seen this morning and is requesting something for her pain. States she forgot to ask when she was there.     Please use the [facility] pharmacy on file per pt    Pt can be reached at p#[phone]

## 2018-08-20 NOTE — PROGRESS NOTES
[facility]  [address], [address]  Phone: [phone]  Fax: [phone]        PROGRESS NOTE      HISTORY OF PRESENT ILLNESS:  The patient is a 62 y.o. female and was seen today for follow up of low back pain into the BLE extending in an S1 distribution to the knees. Pain is exacerbated by standing and ambulating. Symptoms consistent for stenosis. Per patient, she has a torn meniscus of the right knee which could possibly be contributing to her low back pain. Pt reports her baseline level of pain at 4/10. The patient additional had complaints of neck pain. Pt underwent L3/4 epidural from 1/31/17 reporting some relief but notes prior block offered better relief. Pt underwent L3/4 epidural on 7/25/17 and reported she was symptom-free. Pt failed NEURONTIN and TOPAMAX. Pt is compliant with Cymbalta 30 mg per day. She reports rare use of Tramadol. Pt was prescribed Klonopin for insomnia but states she has discontinued use. Pt is completing her HEP daily. She has hx of DM and reports monitoring her blood sugars 3x/day. She denies hx of glaucoma. At that time, patient was doing well. Lumbar spine MRI per report revealed degenerative disc disease at L3-L4, L4-L5 and L5-S1 with severe spinal canal stenosis at L4-L5 secondary to a disc protrusion and degenerative facet changes. Additionally, the patient had complaints of neck pain. Upper extremity EMG per report revealed evidence of moderately severe carpal tunnel syndrome bilaterally, worse on the right. Cervical spine MRI per report revealed degenerative disc disease at multiple levels with foraminal encroachment and uncovertebral joint spurring. At her last clinical appointment, the patient continued to do well. She attempted to decrease her Cymbalta to 30 mg per day. Patient was been instructed to notify our office if her pain returned with the reduction in her Cymbalta dose.        The patient returns today with low back pain radiating into the BLE in an S1 distribution to the mid thigh. She rates her pain 8/10, an increase from her last visit (0/10). She returns today for an early f/u due to continued pain. Pt is compliant with Cymbalta 60 mg per day. She completed MDP with minimal transient relief. She is completing her HEP daily. Pt denies change in bowel or bladder habits. She is followed for her DM by Dr. Hilton Mills. She reports her blood sugars are well controlled. Note from Prasanth Acuña NP dated 6/27/18 indicating patient was seen with c/o low back and BLE pain, most severe in the bilateral buttocks. Patient decreased her Cymbalta to 30 mg daily, and did well for a while. She went on vacation and noted an increase in pain following this, so she increased her Cymbalta back to 60 mg daily. She was scheduled to f/u in 6 month's time. Her pain at that time was 4-8/10 with no specific injury or trauma. She was treated with MDP at this time and continued on Cymbalta.  reviewed. Body mass index is 36.18 kg/(m^2). PCP: Ciro Amaya MD      Past Medical History:   Diagnosis Date    Arthritis     Cervical radiculopathy     [facility]     Hypercholesteremia     Hypertension         Social History     Social History    Marital status:      Spouse name: N/A    Number of children: N/A    Years of education: N/A     Occupational History    Not on file. Social History Main Topics    Smoking status: Never Smoker    Smokeless tobacco: Never Used    Alcohol use 0.5 oz/week     1 Glasses of wine per week    Drug use: No    Sexual activity: Not on file     Other Topics Concern    Not on file     Social History Narrative       Current Outpatient Prescriptions   Medication Sig Dispense Refill    multivitamin (ONE A DAY) tablet Take 1 Tab by mouth daily.  cholecalciferol (VITAMIN D3) 1,000 unit cap Take  by mouth daily.  DULoxetine (CYMBALTA) 60 mg capsule Take 1 Cap by mouth daily. Indications: CHRONIC MUSCULOSKELETAL PAIN, NEUROPATHIC PAIN 90 Cap 1    DULoxetine (CYMBALTA) 60 mg capsule Take 1 Cap by mouth daily. Indications: NEUROPATHIC PAIN 90 Cap 0    insulin aspart (NOVOLOG FLEXPEN) 100 unit/mL inpn by SubCUTAneous route.  LANTUS SOLOSTAR 100 unit/mL (3 mL) pen       CRESTOR 20 mg tablet       metFORMIN (GLUCOPHAGE) 1,000 mg tablet Take 1,000 mg by mouth two (2) times daily (with meals).  losartan 50 mg tab 100 mg, hydrochlorothiazide 25 mg tab 25 mg Take  by mouth daily.  ACETAMINOPHEN (TYLENOL EXTRA STRENGTH PO) Take  by mouth.  DULoxetine (CYMBALTA) 30 mg capsule Take 1 Cap by mouth daily. Indications: NEUROPATHIC PAIN (Patient not taking: Reported on 6/27/2018) 90 Cap 1    oxyCODONE-acetaminophen (PERCOCET) 5-325 mg per tablet take 1 to 2 tablets by mouth every 4 to 6 hours if needed for pain  0    HYDROcodone-acetaminophen (NORCO) 5-325 mg per tablet Take 1 Tab by mouth every eight (8) hours as needed for Pain. Max Daily Amount: 3 Tabs. Indications: PAIN 30 Tab 0    traMADol (ULTRAM) 50 mg tablet Take 1 Tab by mouth daily. Max Daily Amount: 50 mg. 30 Tab 0    clonazePAM (KLONOPIN) 0.5 mg tablet       cyclobenzaprine (FLEXERIL) 10 mg tablet Take  by mouth three (3) times daily as needed for Muscle Spasm(s).          Allergies   Allergen Reactions    Actos [Pioglitazone] Unable to Obtain    Daypro [Oxaprozin] Unknown (comments)    Lisinopril Unable to Obtain    Other Medication Unable to Obtain     Other allergies are:  Antiinflammatories  Algesics  And several diabetic medications    Topamax [Topiramate] Other (comments)     Unknown reaction    Zocor [Simvastatin] Myalgia          PHYSICAL EXAMINATION    Visit Vitals    /64    Pulse 94    Resp 16    Ht 5' 7\" (1.702 m)    Wt 104.8 kg (231 lb)    BMI 36.18 kg/m2       CONSTITUTIONAL: NAD, A&O x 3  SENSATION: Intact to light touch throughout  RANGE OF MOTION: The patient has full passive range of motion in all four extremities. MOTOR:  Straight Leg Raise: Negative, bilateral               Hip Flex Knee Ext Knee Flex Ankle DF GTE Ankle PF Tone   Right +4/5 +4/5 +4/5 +4/5 +4/5 +4/5 +4/5   Left +4/5 +4/5 +4/5 +4/5 +4/5 +4/5 +4/5       ASSESSMENT   Diagnoses and all orders for this visit:    1. Spinal stenosis of lumbar region without neurogenic claudication  -     SCHEDULE SURGERY    2. DDD (degenerative disc disease), lumbar  -     SCHEDULE SURGERY    3. Severe obesity (BMI 35.0-39.9) ([facility] [address]  -     SCHEDULE SURGERY          IMPRESSION AND PLAN:  Patient wishes to proceed with blocks. I will check with Dr. Awa Arevalo and Dr. Loki Carnes about coming off her Aspirin temporarily and her blood sugars. Following approval, I will order a L3/4 epidural. She did not require refills of her Cymbalta 60 mg daily at this time. I offered to try her on another neuropathic pain medication but she declined. Patient is neurologically intact. I recommend she continue with her HEP daily. Patient is not interested in surgical intervention at this time. I will see the patient back following block. Written by Bharat Prieto, as dictated by Odessa Barboza MD  I examined the patient, reviewed and agree with the note.

## 2018-08-21 NOTE — TELEPHONE ENCOUNTER
Spoke to Dr. Minerva Lozoya about this and he is willing to give the patient Ultram 50mg #40 Rf0. Called patient to inform her and she states \" it did not work before, I have tried it and it's like eating candy\" Tylenol worked better. Please advise.

## 2018-08-22 NOTE — TELEPHONE ENCOUNTER
Patient called to follow up on med request.  She's requesting to be called back at work with answer at [phone] (rings directly to her)

## 2018-08-23 NOTE — TELEPHONE ENCOUNTER
Called patient but was unable to get the number to work. If patient calls back I was callingot inform her that if the tylenol works better than ultram she should continue taking tylenol.

## 2018-08-31 ENCOUNTER — DOCUMENTATION ONLY (OUTPATIENT)
Dept: ORTHOPEDIC SURGERY | Age: 58
End: 2018-08-31

## 2018-08-31 NOTE — PROGRESS NOTES
RECEIVED OKAY IN WRITING FROM DR Lazara BONILLA FOR PT TO D/C ASP 5 DAYS PRIOR TO PROCEDURE AND OKAY FROM DR SCHWARTZ  STATING THAT PATIETS BLOOD SUGAR IS CONTROLLED- BOTH WILL BE SCANNED INTO EMR

## 2018-09-11 ENCOUNTER — OFFICE VISIT (OUTPATIENT)
Dept: ORTHOPEDIC SURGERY | Age: 58
End: 2018-09-11

## 2018-09-11 VITALS
RESPIRATION RATE: 16 BRPM | BODY MASS INDEX: 36.1 KG/M2 | SYSTOLIC BLOOD PRESSURE: 142 MMHG | WEIGHT: 230 LBS | HEIGHT: 67 IN | HEART RATE: 95 BPM | DIASTOLIC BLOOD PRESSURE: 67 MMHG

## 2018-09-11 DIAGNOSIS — M48.061 SPINAL STENOSIS OF LUMBAR REGION WITHOUT NEUROGENIC CLAUDICATION: Primary | ICD-10-CM

## 2018-09-11 DIAGNOSIS — M51.36 DDD (DEGENERATIVE DISC DISEASE), LUMBAR: ICD-10-CM

## 2018-09-11 RX ORDER — PREGABALIN 50 MG/1
50 CAPSULE ORAL 2 TIMES DAILY
Qty: 60 CAP | Refills: 1 | Status: SHIPPED | OUTPATIENT
Start: 2018-09-11 | End: 2019-01-28 | Stop reason: SDUPTHER

## 2018-09-11 RX ORDER — PREGABALIN 50 MG/1
50 CAPSULE ORAL 2 TIMES DAILY
Qty: 42 CAP | Refills: 0 | Status: SHIPPED | OUTPATIENT
Start: 2018-09-11 | End: 2021-02-26 | Stop reason: SDUPTHER

## 2018-09-11 NOTE — MR AVS SNAPSHOT
[address] [facility] 
 
 
 [address] 200 [facility] 
[phone] Patient: Clau García MRN: [id number] QFF:6/[id number] Visit Information Date & Time Provider Department Dept. Phone Encounter #  
 9/11/2018 10:50 AM Shmuel Farfan  [facility], [address] and [facility] [place] [phone] [id number] Follow-up Instructions Return for following MRI. Your Appointments 1/2/2019  8:30 AM  
Follow Up with Jessica Jimenes NP  
[facility] [place] [facility]) Appt Note: 6 mo follow up  lower back; 1st attempt to reschedule 8/27/18*  All of the numbers on file for the patient have been called and messages have been left in regards to getting this appointment rescheduled, provider is out of the office for this appointment; PT RETURNED [place] 12/26/18 DUE TO PROVIDER AND NP OUT OF OFFICE. PT RS FOR THIS NEW DATE.  
 [address] [facility] [address]  
[address] [facility] Upcoming Health Maintenance Date Due Hepatitis C Screening 1960 DTaP/Tdap/Td series (1 - Tdap) 7/11/1981 PAP AKA CERVICAL CYTOLOGY 7/11/1981 BREAST CANCER SCRN MAMMOGRAM 7/11/2010 FOBT Q 1 YEAR AGE 50-75 7/11/2010 Influenza Age 5 to Adult 8/1/2018 Allergies as of 9/11/2018  Review Complete On: 9/11/2018 By: Shmuel Farfan MD  
  
 Severity Noted Reaction Type Reactions Actos [Pioglitazone]    Unable to Obtain Daypro [Oxaprozin]  10/15/2012    Unknown (comments) Lisinopril    Unable to Obtain Other Medication    Unable to Obtain Other allergies are: Antiinflammatories Algesics And several diabetic medications Topamax [Topiramate]  01/25/2017    Other (comments) Unknown reaction Zocor [Simvastatin]  01/07/2016    Myalgia Current Immunizations  Never Reviewed No immunizations on file. Not reviewed this visit You Were Diagnosed With   
  
 Codes Comments Spinal stenosis of lumbar region without neurogenic claudication    -  Primary ICD-10-CM: M48.061 
ICD-9-CM: 724.02   
 DDD (degenerative disc disease), lumbar     ICD-10-CM: M51.36 
ICD-9-CM: 722.52 Vitals BP Pulse Resp Height(growth percentile) Weight(growth percentile) BMI  
 142/67 95 16 5' 7\" (1.702 m) 230 lb (104.3 kg) 36.02 kg/m2 Smoking Status Never Smoker Vitals History BMI and BSA Data Body Mass Index Body Surface Area 36.02 kg/m 2 2.22 m 2 Preferred Pharmacy Pharmacy Name Phone 3 [address] [phone] Your Updated Medication List  
  
   
This list is accurate as of 9/11/18 12:04 PM.  Always use your most recent med list.  
  
  
  
  
 clonazePAM 0.5 mg tablet Commonly known as:  KlonoPIN  
  
 CRESTOR 20 mg tablet Generic drug:  rosuvastatin * DULoxetine 30 mg capsule Commonly known as:  CYMBALTA Take 1 Cap by mouth daily. Indications: NEUROPATHIC PAIN  
  
 * DULoxetine 60 mg capsule Commonly known as:  CYMBALTA Take 1 Cap by mouth daily. Indications: CHRONIC MUSCULOSKELETAL PAIN, NEUROPATHIC PAIN  
  
 FLEXERIL 10 mg tablet Generic drug:  cyclobenzaprine Take  by mouth three (3) times daily as needed for Muscle Spasm(s). HYDROcodone-acetaminophen 5-325 mg per tablet Commonly known as:  Hall Minor Take 1 Tab by mouth every eight (8) hours as needed for Pain. Max Daily Amount: 3 Tabs. Indications: PAIN  
  
 LANTUS SOLOSTAR U-100 INSULIN 100 unit/mL (3 mL) Inpn Generic drug:  insulin glargine  
  
 losartan 50 mg tab 100 mg, hydrochlorothiazide 25 mg tab 25 mg Take  by mouth daily. metFORMIN 1,000 mg tablet Commonly known as:  GLUCOPHAGE Take 1,000 mg by mouth two (2) times daily (with meals). multivitamin tablet Commonly known as:  ONE A DAY  
 Take 1 Tab by mouth daily. NovoLOG Flexpen U-100 Insulin 100 unit/mL Inpn Generic drug:  insulin aspart U-100  
by SubCUTAneous route. oxyCODONE-acetaminophen 5-325 mg per tablet Commonly known as:  PERCOCET  
take 1 to 2 tablets by mouth every 4 to 6 hours if needed for pain  
  
 traMADol 50 mg tablet Commonly known as:  ULTRAM  
Take 1 Tab by mouth daily. Max Daily Amount: 50 mg.  
  
 TYLENOL EXTRA STRENGTH PO Take  by mouth. VITAMIN D3 1,000 unit Cap Generic drug:  cholecalciferol Take  by mouth daily. * Notice: This list has 2 medication(s) that are the same as other medications prescribed for you. Read the directions carefully, and ask your doctor or other care provider to review them with you. Follow-up Instructions Return for following MRI. To-Do List   
 09/18/2018 Imaging:  MRI LUMB SPINE WO CONT Referral Information Referral ID Referred By Referred To  
  
 [id number] AMY LEDEZMA III Not Available Visits Status Start Date End Date 1 New Request 9/11/18 9/11/19 If your referral has a status of pending review or denied, additional information will be sent to support the outcome of this decision. Introducing [facility]! [facility] introduces [employer] patient portal. Now you can access parts of your medical record, email your doctor's office, and request medication refills online. 1. In your internet browser, go to [email or web address] 2. Click on the First Time User? Click Here link in the Sign In box. You will see the New Member Sign Up page. 3. Enter your [employer] Access Code exactly as it appears below. You will not need to use this code after youve completed the sign-up process. If you do not sign up before the expiration date, you must request a new code. · [employer] Access Code: [email or web address] Expires: 9/24/2018 11:37 AM 
 
 4. Enter the last four digits of your Social Security Number (xxxx) and Date of Birth (mm/dd/yyyy) as indicated and click Submit. You will be taken to the next sign-up page. 5. Create a [employer] ID. This will be your [employer] login ID and cannot be changed, so think of one that is secure and easy to remember. 6. Create a [employer] password. You can change your password at any time. 7. Enter your Password Reset Question and Answer. This can be used at a later time if you forget your password. 8. Enter your e-mail address. You will receive e-mail notification when new information is available in [address] 9. Click Sign Up. You can now view and download portions of your medical record. 10. Click the Download Summary menu link to download a portable copy of your medical information. If you have questions, please visit the Frequently Asked Questions section of the [employer] website. Remember, [employer] is NOT to be used for urgent needs. For medical emergencies, dial 911. Now available from your iPhone and Android! Please provide this summary of care documentation to your next provider. Your primary care clinician is listed as Jed Riddle. If you have any questions after today's visit, please call [phone].

## 2018-09-11 NOTE — PROGRESS NOTES
[facility]  [address]  [address], [address]   Phone: [phone]  Fax: [phone]        PROGRESS NOTE      HISTORY OF PRESENT ILLNESS:  The patient is a 62 y.o. female and was seen today for follow up of low back pain radiating into the BLE in an S1 distribution to the mid thigh. She was previously seen with low back pain into the BLE extending in an S1 distribution to the knees. Pain is exacerbated by standing and ambulating. Symptoms consistent for stenosis. Per patient, she has a torn meniscus of the right knee which could possibly be contributing to her low back pain. Pt reports her baseline level of pain at 4/10. The patient additional had complaints of neck pain. Pt underwent L3/4 epidural from 1/31/17 reporting some relief but notes prior block offered better relief. Pt underwent L3/4 epidural on 7/25/17 and reported she was symptom-free. Pt failed NEURONTIN and TOPAMAX. Pt is compliant with Cymbalta 30 mg per day. She reports rare use of Tramadol. Pt was prescribed Klonopin for insomnia but states she has discontinued use. She completed MDP with minimal transient relief. Pt is completing her HEP daily. She has hx of DM and reports monitoring her blood sugars 3x/day. She is followed for her DM by Dr. Suzanne Parisi. She reports her blood sugars are well controlled. She denies hx of glaucoma. At that time, patient was doing well. Note from Laurie Noriega NP dated 6/27/18 indicating patient was seen with c/o low back and BLE pain, most severe in the bilateral buttocks. Patient decreased her Cymbalta to 30 mg daily, and did well for a while. She went on vacation and noted an increase in pain following this, so she increased her Cymbalta back to 60 mg daily. She was scheduled to f/u in 6 month's time. Her pain at that time was 4-8/10 with no specific injury or trauma. She was treated with MDP at this time and continued on Cymbalta.  Lumbar spine MRI per report revealed degenerative disc disease at L3-L4, L4-L5 and L5-S1 with severe spinal canal stenosis at L4-L5 secondary to a disc protrusion and degenerative facet changes. Additionally, the patient had complaints of neck pain. Upper extremity EMG per report revealed evidence of moderately severe carpal tunnel syndrome bilaterally, worse on the right. Cervical spine MRI per report revealed degenerative disc disease at multiple levels with foraminal encroachment and uncovertebral joint spurring. At her last clinical appointment, patient wished to proceed with blocks. I checked with Dr. Evelio Peres and Dr. Nicolasa Pendleton about coming off her Aspirin temporarily and her blood sugars. Following approval, I ordered a L3/4 epidural. She did not require refills of her Cymbalta 60 mg daily at that time. I offered to try her on another neuropathic pain medication but she declined. The patient returns today with low back pain radiating into the BLE (L>R) in an S1 distribution to the foot. She reports her pain is a near-constant 9/10. She rates her pain 9/10, an increase from her last visit (4-8/10). She describes symptoms consistent with spinal stenosis. Patient came in for an early f/u due to requesting a note for work to allow her to use a cane. Pt is compliant with Cymbalta 60 mg per day. She is completing her HEP daily. Pt underwent an L3/4 epidural on 9/4/18 with no relief at this time.  reviewed. Body mass index is 36.02 kg/(m^2). PCP: Sincere Rosenberg MD      Past Medical History:   Diagnosis Date    Arthritis     Cervical radiculopathy     Diabetes ([facility] [address]     Hypercholesteremia     Hypertension         Social History     Social History    Marital status:      Spouse name: N/A    Number of children: N/A    Years of education: N/A     Occupational History    Not on file.      Social History Main Topics    Smoking status: Never Smoker    Smokeless tobacco: Never Used    Alcohol use 0.5 oz/week     1 Glasses of wine per week    Drug use: No    Sexual activity: Not on file     Other Topics Concern    Not on file     Social History Narrative       Current Outpatient Prescriptions   Medication Sig Dispense Refill    pregabalin (LYRICA) 50 mg capsule Take 1 Cap by mouth two (2) times a day. Max Daily Amount: 100 mg. 42 Cap 0    pregabalin (LYRICA) 50 mg capsule Take 1 Cap by mouth two (2) times a day. Max Daily Amount: 100 mg. 60 Cap 1    multivitamin (ONE A DAY) tablet Take 1 Tab by mouth daily.  cholecalciferol (VITAMIN D3) 1,000 unit cap Take  by mouth daily.  DULoxetine (CYMBALTA) 60 mg capsule Take 1 Cap by mouth daily. Indications: CHRONIC MUSCULOSKELETAL PAIN, NEUROPATHIC PAIN 90 Cap 1    insulin aspart (NOVOLOG FLEXPEN) 100 unit/mL inpn by SubCUTAneous route.  LANTUS SOLOSTAR 100 unit/mL (3 mL) pen       CRESTOR 20 mg tablet       metFORMIN (GLUCOPHAGE) 1,000 mg tablet Take 1,000 mg by mouth two (2) times daily (with meals).  losartan 50 mg tab 100 mg, hydrochlorothiazide 25 mg tab 25 mg Take  by mouth daily.  ACETAMINOPHEN (TYLENOL EXTRA STRENGTH PO) Take  by mouth.  DULoxetine (CYMBALTA) 30 mg capsule Take 1 Cap by mouth daily. Indications: NEUROPATHIC PAIN (Patient not taking: Reported on 6/27/2018) 90 Cap 1    oxyCODONE-acetaminophen (PERCOCET) 5-325 mg per tablet take 1 to 2 tablets by mouth every 4 to 6 hours if needed for pain  0    HYDROcodone-acetaminophen (NORCO) 5-325 mg per tablet Take 1 Tab by mouth every eight (8) hours as needed for Pain. Max Daily Amount: 3 Tabs. Indications: PAIN 30 Tab 0    traMADol (ULTRAM) 50 mg tablet Take 1 Tab by mouth daily. Max Daily Amount: 50 mg. 30 Tab 0    clonazePAM (KLONOPIN) 0.5 mg tablet       cyclobenzaprine (FLEXERIL) 10 mg tablet Take  by mouth three (3) times daily as needed for Muscle Spasm(s).          Allergies   Allergen Reactions    Actos [Pioglitazone] Unable to Obtain    Daypro [Oxaprozin] Unknown (comments)    Lisinopril Unable to Obtain    Other Medication Unable to Obtain     Other allergies are:  Antiinflammatories  Algesics  And several diabetic medications    Topamax [Topiramate] Other (comments)     Unknown reaction    Zocor [Simvastatin] Myalgia        Ambulates with a single point cane. PHYSICAL EXAMINATION    Visit Vitals    /67    Pulse 95    Resp 16    Ht 5' 7\" (1.702 m)    Wt 104.3 kg (230 lb)    BMI 36.02 kg/m2       CONSTITUTIONAL: NAD, A&O x 3  SENSATION: Intact to light touch throughout  RANGE OF MOTION: The patient has full passive range of motion in all four extremities. MOTOR:  Straight Leg Raise: Negative, bilateral               Hip Flex Knee Ext Knee Flex Ankle DF GTE Ankle PF Tone   Right +4/5 +4/5 +4/5 +4/5 +4/5 +4/5 +4/5   Left +4/5 +4/5 +4/5 +4/5 +4/5 +4/5 +4/5       ASSESSMENT   Diagnoses and all orders for this visit:    1. Spinal stenosis of lumbar region without neurogenic claudication  -     MRI LUMB SPINE WO CONT; Future  -     pregabalin (LYRICA) 50 mg capsule; Take 1 Cap by mouth two (2) times a day. Max Daily Amount: 100 mg.  -     pregabalin (LYRICA) 50 mg capsule; Take 1 Cap by mouth two (2) times a day. Max Daily Amount: 100 mg.    2. DDD (degenerative disc disease), lumbar  -     MRI LUMB SPINE WO CONT; Future  -     pregabalin (LYRICA) 50 mg capsule; Take 1 Cap by mouth two (2) times a day. Max Daily Amount: 100 mg.  -     pregabalin (LYRICA) 50 mg capsule; Take 1 Cap by mouth two (2) times a day. Max Daily Amount: 100 mg. IMPRESSION AND PLAN:  Patient returned for an early f/u to request a note for work. It is too early to assess the full benefit of her block on 9/4/18 at this visit. I will set her up for a new lumbar spine MRI. I advised patient to bring copies of films to next visit. I provided patient with a note for work to allow her to use a cane. She did not require refills of her Cymbalta 60 mg daily at this time.  I will try her on Lyrica 50 mg BID. The risks, benefits, and potential side effects of this medication were discussed. Patient understands and wishes to proceed. Patient advised to call the office if intolerant to new medication. Patient is neurologically intact. I recommend she continue her HEP daily. I will see the patient back following MRI. Written by Magdalena Bhatt, as dictated by Nikos Hannon MD  I examined the patient, reviewed and agree with the note.

## 2018-09-18 ENCOUNTER — DOCUMENTATION ONLY (OUTPATIENT)
Dept: ORTHOPEDIC SURGERY | Age: 58
End: 2018-09-18

## 2018-09-18 NOTE — PROGRESS NOTES
MRI Lumbar without is scheduled at [facility], [phone], on 09/24/18, arrive 7:30PM, test 8:00PM. [place] pre-authorization [id number]*KARTHIKEYAN, effective 09/11/18-12/10/18

## 2018-09-26 ENCOUNTER — OFFICE VISIT (OUTPATIENT)
Dept: ORTHOPEDIC SURGERY | Age: 58
End: 2018-09-26

## 2018-09-26 VITALS
HEART RATE: 98 BPM | BODY MASS INDEX: 36.32 KG/M2 | SYSTOLIC BLOOD PRESSURE: 141 MMHG | RESPIRATION RATE: 14 BRPM | WEIGHT: 231.4 LBS | DIASTOLIC BLOOD PRESSURE: 74 MMHG | HEIGHT: 67 IN

## 2018-09-26 DIAGNOSIS — M51.36 DDD (DEGENERATIVE DISC DISEASE), LUMBAR: ICD-10-CM

## 2018-09-26 DIAGNOSIS — M48.061 SPINAL STENOSIS OF LUMBAR REGION WITHOUT NEUROGENIC CLAUDICATION: Primary | ICD-10-CM

## 2018-09-26 NOTE — MR AVS SNAPSHOT
[address] [facility] 
 
 
 [address] 200 [facility] 
[phone] Patient: Clau García MRN: [id number] Visit Information Date & Time Provider Department Dept. Phone Encounter #  
 9/26/2018  3:20 PM Shmuel Farfan MD 4 [facility], [address] and [facility] [place] [phone] [id number] Follow-up Instructions Return in about 4 weeks (around 10/24/2018). Your Appointments 1/2/2019  8:30 AM  
Follow Up with Jessica Jimenes NP  
[facility]) Appt Note: 6 mo follow up  lower back; 1st attempt to reschedule 8/27/18*  All of the numbers on file for the patient have been called and messages have been left in regards to getting this appointment rescheduled, provider is out of the office for this appointment; PT RETURNED [address] 12/26/18 DUE TO PROVIDER AND NP OUT OF OFFICE. PT RS FOR THIS NEW DATE.  
 [address] [facility] [address]  
[address] [facility] Upcoming Health Maintenance Date Due Hepatitis C Screening 1960 DTaP/Tdap/Td series (1 - Tdap) 7/11/1981 PAP AKA CERVICAL CYTOLOGY 7/11/1981 Shingrix Vaccine Age 50> (1 of 2) 7/11/2010 BREAST CANCER SCRN MAMMOGRAM 7/11/2010 FOBT Q 1 YEAR AGE 50-75 7/11/2010 Influenza Age 5 to Adult 8/1/2018 Allergies as of 9/26/2018  Review Complete On: 9/26/2018 By: Shmuel Farfan MD  
  
 Severity Noted Reaction Type Reactions Actos [Pioglitazone]    Unable to Obtain Daypro [Oxaprozin]  10/15/2012    Unknown (comments) Lisinopril    Unable to Obtain Other Medication    Unable to Obtain Other allergies are: Antiinflammatories Algesics And several diabetic medications Topamax [Topiramate]  01/25/2017    Other (comments) Unknown reaction Zocor [Simvastatin]  01/07/2016    Myalgia Current Immunizations  Never Reviewed No immunizations on file. Not reviewed this visit You Were Diagnosed With   
  
 Codes Comments Spinal stenosis of lumbar region without neurogenic claudication    -  Primary ICD-10-CM: M48.061 
ICD-9-CM: 724.02   
 DDD (degenerative disc disease), lumbar     ICD-10-CM: M51.36 
ICD-9-CM: 722.52 Vitals BP Pulse Resp Height(growth percentile) Weight(growth percentile) BMI  
 141/74 98 14 5' 7\" (1.702 m) 231 lb 6.4 oz (105 kg) 36.24 kg/m2 Smoking Status Never Smoker Vitals History BMI and BSA Data Body Mass Index Body Surface Area  
 36.24 kg/m 2 2.23 m 2 Preferred Pharmacy Pharmacy Name Phone 3 [address] [phone] Your Updated Medication List  
  
   
This list is accurate as of 9/26/18  4:51 PM.  Always use your most recent med list.  
  
  
  
  
 clonazePAM 0.5 mg tablet Commonly known as:  KlonoPIN  
  
 CRESTOR 20 mg tablet Generic drug:  rosuvastatin * DULoxetine 30 mg capsule Commonly known as:  CYMBALTA Take 1 Cap by mouth daily. Indications: NEUROPATHIC PAIN  
  
 * DULoxetine 60 mg capsule Commonly known as:  CYMBALTA Take 1 Cap by mouth daily. Indications: CHRONIC MUSCULOSKELETAL PAIN, NEUROPATHIC PAIN  
  
 FLEXERIL 10 mg tablet Generic drug:  cyclobenzaprine Take  by mouth three (3) times daily as needed for Muscle Spasm(s). HYDROcodone-acetaminophen 5-325 mg per tablet Commonly known as:  Fannie Ismael Take 1 Tab by mouth every eight (8) hours as needed for Pain. Max Daily Amount: 3 Tabs. Indications: PAIN  
  
 LANTUS SOLOSTAR U-100 INSULIN 100 unit/mL (3 mL) Inpn Generic drug:  insulin glargine  
  
 losartan 50 mg tab 100 mg, hydrochlorothiazide 25 mg tab 25 mg Take  by mouth daily. metFORMIN 1,000 mg tablet Commonly known as:  GLUCOPHAGE  
 Take 1,000 mg by mouth two (2) times daily (with meals). multivitamin tablet Commonly known as:  ONE A DAY Take 1 Tab by mouth daily. NovoLOG Flexpen U-100 Insulin 100 unit/mL Inpn Generic drug:  insulin aspart U-100  
by SubCUTAneous route. oxyCODONE-acetaminophen 5-325 mg per tablet Commonly known as:  PERCOCET  
take 1 to 2 tablets by mouth every 4 to 6 hours if needed for pain * pregabalin 50 mg capsule Commonly known as:  Walda Smoker Take 1 Cap by mouth two (2) times a day. Max Daily Amount: 100 mg.  
  
 * pregabalin 50 mg capsule Commonly known as:  Walda Smoker Take 1 Cap by mouth two (2) times a day. Max Daily Amount: 100 mg.  
  
 traMADol 50 mg tablet Commonly known as:  ULTRAM  
Take 1 Tab by mouth daily. Max Daily Amount: 50 mg.  
  
 TYLENOL EXTRA STRENGTH PO Take  by mouth. VITAMIN D3 1,000 unit Cap Generic drug:  cholecalciferol Take  by mouth daily. * Notice: This list has 4 medication(s) that are the same as other medications prescribed for you. Read the directions carefully, and ask your doctor or other care provider to review them with you. Follow-up Instructions Return in about 4 weeks (around 10/24/2018). Introducing [facility]! Mirtha Nguyen introduces [employer] patient portal. Now you can access parts of your medical record, email your doctor's office, and request medication refills online. 1. In your internet browser, go to [email or web address] 2. Click on the First Time User? Click Here link in the Sign In box. You will see the New Member Sign Up page. 3. Enter your [employer] Access Code exactly as it appears below. You will not need to use this code after youve completed the sign-up process. If you do not sign up before the expiration date, you must request a new code. · [employer] Access Code: [email or web address] Expires: 12/24/2018  2:45 PM 
 
 4. Enter the last four digits of your Social Security Number (xxxx) and Date of Birth (mm/dd/yyyy) as indicated and click Submit. You will be taken to the next sign-up page. 5. Create a [employer] ID. This will be your [employer] login ID and cannot be changed, so think of one that is secure and easy to remember. 6. Create a [employer] password. You can change your password at any time. 7. Enter your Password Reset Question and Answer. This can be used at a later time if you forget your password. 8. Enter your e-mail address. You will receive e-mail notification when new information is available in [address] 9. Click Sign Up. You can now view and download portions of your medical record. 10. Click the Download Summary menu link to download a portable copy of your medical information. If you have questions, please visit the Frequently Asked Questions section of the [employer] website. Remember, [employer] is NOT to be used for urgent needs. For medical emergencies, dial 911. Now available from your iPhone and Android! Please provide this summary of care documentation to your next provider. Your primary care clinician is listed as Shailesh Porras. If you have any questions after today's visit, please call [phone].

## 2018-09-26 NOTE — PROGRESS NOTES
[facility]  [address], [address]  Phone: [phone]  Fax: [phone]        PROGRESS NOTE      HISTORY OF PRESENT ILLNESS:  The patient is a 62 y.o. female and was seen today for follow up of low back pain radiating into the BLE (L>R) in an S1 distribution to the foot. She reports her pain is a near-constant 9/10. She describes symptoms consistent with spinal stenosis. Her pain was previously low back pain radiating into the BLE in an S1 distribution to the mid thigh. She was previously seen with low back pain into the BLE extending in an S1 distribution to the knees. Pain is exacerbated by standing and ambulating. Per patient, she has a torn meniscus of the right knee which could possibly be contributing to her low back pain. Pt reports her baseline level of pain at 4/10. The patient additional had complaints of neck pain. Pt underwent L3/4 epidural from 1/31/17 reporting some relief but notes prior block offered better relief. Pt underwent L3/4 epidural on 7/25/17 and reported she was symptom-free. Pt underwent an L3/4 epidural on 9/4/18 with no relief at this time. Pt failed NEURONTIN and TOPAMAX. Pt is compliant with Cymbalta 30 mg per day. She reports rare use of Tramadol. Pt was prescribed Klonopin for insomnia but states she has discontinued use. She completed MDP with minimal transient relief. Pt is completing her HEP daily. She has hx of DM and reports monitoring her blood sugars 3x/day. She is followed for her DM by Dr. Uriah Morton. She reports her blood sugars are well controlled. She denies hx of glaucoma. At that time, patient was doing well. Note from Analisa Guerrero NP dated 6/27/18 indicating patient was seen with c/o low back and BLE pain, most severe in the bilateral buttocks. Patient decreased her Cymbalta to 30 mg daily, and did well for a while.  She went on vacation and noted an increase in pain following this, so she increased her Cymbalta back to 60 mg daily. She was scheduled to f/u in 6 month's time. Her pain at that time was 4-8/10 with no specific injury or trauma. She was treated with MDP at this time and continued on Cymbalta. Lumbar spine MRI per report revealed degenerative disc disease at L3-L4, L4-L5 and L5-S1 with severe spinal canal stenosis at L4-L5 secondary to a disc protrusion and degenerative facet changes. Additionally, the patient had complaints of neck pain. Upper extremity EMG per report revealed evidence of moderately severe carpal tunnel syndrome bilaterally, worse on the right. Cervical spine MRI per report revealed degenerative disc disease at multiple levels with foraminal encroachment and uncovertebral joint spurring. At her last clinical appointment, patient returned for an early f/u to request a note for work. It was too early to assess the full benefit of her block on 9/4/18 at this visit. I set her up for a new lumbar spine MRI. I provided patient with a note for work to allow her to use a cane. She did not require refills of her Cymbalta 60 mg daily at that time. I tried her on Lyrica 50 mg BID. The patient returns today pain localized to the low back. She denies radicular symptoms at this time. She rates her pain 3/10, a decrease from her last visit (9/10). She is tolerating Lyrica 50 mg daily with relief, she is waiting on preauthorization at the pharmacy to take it BID. She is compliant with Cymbalta 60 mg daily. She is completing her HEP daily. Lumbar spine MRI dated 9/24/18 reviewed. Per report, broad-based disc protrusion L3-4 has increased in size in the interval. Associated moderate spinal canal encroachment. Borad-based disc protrusion at L4-5 was present previously. However there is a new extruded disc fragment posterior to the L4 level in the left central region. There is severe spinal canal stenosis with effacement of the thecal sac. Multilevel exit foraminal encroachment as described above.  Similar to the prior study at the L4-5 disc space level. Broad-based disc protrusion with posterior osteophytic ridging at L5-S1 similar to the prior study.  reviewed. Body mass index is 36.24 kg/(m^2). PCP: Lillian Ceja MD      Past Medical History:   Diagnosis Date    Arthritis     Cervical radiculopathy     Diabetes ([address]     Hypercholesteremia     Hypertension         Social History     Social History    Marital status:      Spouse name: N/A    Number of children: N/A    Years of education: N/A     Occupational History    Not on file. Social History Main Topics    Smoking status: Never Smoker    Smokeless tobacco: Never Used    Alcohol use 0.5 oz/week     1 Glasses of wine per week    Drug use: No    Sexual activity: Not on file     Other Topics Concern    Not on file     Social History Narrative       Current Outpatient Prescriptions   Medication Sig Dispense Refill    pregabalin (LYRICA) 50 mg capsule Take 1 Cap by mouth two (2) times a day. Max Daily Amount: 100 mg. 42 Cap 0    pregabalin (LYRICA) 50 mg capsule Take 1 Cap by mouth two (2) times a day. Max Daily Amount: 100 mg. 60 Cap 1    multivitamin (ONE A DAY) tablet Take 1 Tab by mouth daily.  cholecalciferol (VITAMIN D3) 1,000 unit cap Take  by mouth daily.  DULoxetine (CYMBALTA) 60 mg capsule Take 1 Cap by mouth daily. Indications: CHRONIC MUSCULOSKELETAL PAIN, NEUROPATHIC PAIN 90 Cap 1    insulin aspart (NOVOLOG FLEXPEN) 100 unit/mL inpn by SubCUTAneous route.  traMADol (ULTRAM) 50 mg tablet Take 1 Tab by mouth daily. Max Daily Amount: 50 mg. 30 Tab 0    LANTUS SOLOSTAR 100 unit/mL (3 mL) pen       CRESTOR 20 mg tablet       metFORMIN (GLUCOPHAGE) 1,000 mg tablet Take 1,000 mg by mouth two (2) times daily (with meals).  losartan 50 mg tab 100 mg, hydrochlorothiazide 25 mg tab 25 mg Take  by mouth daily.  ACETAMINOPHEN (TYLENOL EXTRA STRENGTH PO) Take  by mouth.       DULoxetine (CYMBALTA) 30 mg capsule Take 1 Cap by mouth daily. Indications: NEUROPATHIC PAIN (Patient not taking: Reported on 6/27/2018) 90 Cap 1    oxyCODONE-acetaminophen (PERCOCET) 5-325 mg per tablet take 1 to 2 tablets by mouth every 4 to 6 hours if needed for pain  0    HYDROcodone-acetaminophen (NORCO) 5-325 mg per tablet Take 1 Tab by mouth every eight (8) hours as needed for Pain. Max Daily Amount: 3 Tabs. Indications: PAIN 30 Tab 0    clonazePAM (KLONOPIN) 0.5 mg tablet       cyclobenzaprine (FLEXERIL) 10 mg tablet Take  by mouth three (3) times daily as needed for Muscle Spasm(s). Allergies   Allergen Reactions    Actos [Pioglitazone] Unable to Obtain    Daypro [Oxaprozin] Unknown (comments)    Lisinopril Unable to Obtain    Other Medication Unable to Obtain     Other allergies are:  Antiinflammatories  Algesics  And several diabetic medications    Topamax [Topiramate] Other (comments)     Unknown reaction    Zocor [Simvastatin] Myalgia          PHYSICAL EXAMINATION    Visit Vitals    /74    Pulse 98    Resp 14    Ht 5' 7\" (1.702 m)    Wt 105 kg (231 lb 6.4 oz)    BMI 36.24 kg/m2       CONSTITUTIONAL: NAD, A&O x 3  SENSATION: Intact to light touch throughout  RANGE OF MOTION: The patient has full passive range of motion in all four extremities. MOTOR:  Straight Leg Raise: Negative, bilateral               Hip Flex Knee Ext Knee Flex Ankle DF GTE Ankle PF Tone   Right +4/5 +4/5 +4/5 +4/5 +4/5 +4/5 +4/5   Left +4/5 +4/5 +4/5 +4/5 +4/5 +4/5 +4/5       ASSESSMENT   Diagnoses and all orders for this visit:    1. Spinal stenosis of lumbar region without neurogenic claudication    2. DDD (degenerative disc disease), lumbar          IMPRESSION AND PLAN:  Patient wished to continue her current treatment. She did not require refills of her Cymbalta 60 mg daily. She did not require refills of her Lyrica 50 mg BID at this time. Patient is neurologically intact.  I recommend she continue with her HEP daily. Patient is not interested in surgical intervention or lumbar blocks at this time. I will see the patient back in 1 month's time or earlier if needed. Written by Jacqueline Mchugh, as dictated by Christina Arechiga MD  I examined the patient, reviewed and agree with the note.

## 2018-10-26 ENCOUNTER — OFFICE VISIT (OUTPATIENT)
Dept: ORTHOPEDIC SURGERY | Age: 58
End: 2018-10-26

## 2018-10-26 VITALS
BODY MASS INDEX: 36.54 KG/M2 | HEART RATE: 81 BPM | DIASTOLIC BLOOD PRESSURE: 77 MMHG | HEIGHT: 67 IN | WEIGHT: 232.8 LBS | SYSTOLIC BLOOD PRESSURE: 128 MMHG | RESPIRATION RATE: 14 BRPM

## 2018-10-26 DIAGNOSIS — M48.061 SPINAL STENOSIS OF LUMBAR REGION WITHOUT NEUROGENIC CLAUDICATION: Primary | ICD-10-CM

## 2018-10-26 DIAGNOSIS — M51.36 DDD (DEGENERATIVE DISC DISEASE), LUMBAR: ICD-10-CM

## 2018-10-26 RX ORDER — PREGABALIN 50 MG/1
50 CAPSULE ORAL 2 TIMES DAILY
Qty: 180 CAP | Refills: 0 | Status: SHIPPED | OUTPATIENT
Start: 2018-10-26 | End: 2019-01-28 | Stop reason: SDUPTHER

## 2018-10-26 RX ORDER — ASPIRIN 81 MG/1
81 TABLET ORAL DAILY
COMMUNITY

## 2018-10-26 NOTE — PROGRESS NOTES
[facility]  [address], [address]   Phone: [phone]  Fax: [phone]        PROGRESS NOTE      HISTORY OF PRESENT ILLNESS:  The patient is a 62 y.o. female and was seen today for follow up of pain localized to the low back. She denies radicular symptoms at this time. She was previously seen with low back pain radiating into the BLE (L>R) in an S1 distribution to the foot. She reports her pain is a near-constant 9/10. She describes symptoms consistent with spinal stenosis. Her pain was previously low back pain radiating into the BLE in an S1 distribution to the mid thigh. She was previously seen with low back pain into the BLE extending in an S1 distribution to the knees. Pain is exacerbated by standing and ambulating. Per patient, she has a torn meniscus of the right knee which could possibly be contributing to her low back pain. Pt reports her baseline level of pain at 4/10. The patient additional had complaints of neck pain. Pt underwent L3/4 epidural from 1/31/17 reporting some relief but notes prior block offered better relief. Pt underwent L3/4 epidural on 7/25/17 and reported she was symptom-free. Pt underwent an L3/4 epidural on 9/4/18 with no relief at this time. Pt failed NEURONTIN and TOPAMAX. Pt is compliant with Cymbalta 30 mg per day. She reports rare use of Tramadol. Pt was prescribed Klonopin for insomnia but states she has discontinued use. She completed MDP with minimal transient relief. Pt is completing her HEP daily. She has hx of DM and reports monitoring her blood sugars 3x/day. She is followed for her DM by Dr. Shira Gabriel. She reports her blood sugars are well controlled. She denies hx of glaucoma. At that time, patient was doing well. Note from Max Ewing NP dated 6/27/18 indicating patient was seen with c/o low back and BLE pain, most severe in the bilateral buttocks.  Patient decreased her Cymbalta to 30 mg daily, and did well for a while. She went on vacation and noted an increase in pain following this, so she increased her Cymbalta back to 60 mg daily. She was scheduled to f/u in 6 month's time. Her pain at that time was 4-8/10 with no specific injury or trauma. She was treated with MDP at this time and continued on Cymbalta. Upper extremity EMG per report revealed evidence of moderately severe carpal tunnel syndrome bilaterally, worse on the right. Cervical spine MRI per report revealed degenerative disc disease at multiple levels with foraminal encroachment and uncovertebral joint spurring. Lumbar spine MRI dated 9/24/18 reviewed. Per report, broad-based disc protrusion L3-4 has increased in size in the interval. Associated moderate spinal canal encroachment. Borad-based disc protrusion at L4-5 was present previously. However there is a new extruded disc fragment posterior to the L4 level in the left central region. There is severe spinal canal stenosis with effacement of the thecal sac. Multilevel exit foraminal encroachment as described above. Similar to the prior study at the L4-5 disc space level. Broad-based disc protrusion with posterior osteophytic ridging at L5-S1 similar to the prior study. At her last clinical appointment, patient wished to continue her current treatment. She did not require refills of her Cymbalta 60 mg daily. She did not require refills of her Lyrica 50 mg BID at that time. The patient returns today with pain location and distribution remain unchanged. She rates her pain 2/10, a slight decrease from her last visit (3/10). She is compliant with Lyrica 50 mg BID and Cymbalta 60 mg daily. She is completing her HEP daily. Pt denies change in bowel or bladder habits.  reviewed. Body mass index is 36.46 kg/m².       PCP: Eloise Oneill MD      Past Medical History:   Diagnosis Date    Arthritis     Cervical radiculopathy     [address] [address]     Hypercholesteremia     Hypertension         Social History     Socioeconomic History    Marital status:      Spouse name: Not on file    Number of children: Not on file    Years of education: Not on file    Highest education level: Not on file   Social Needs    Financial resource strain: Not on file    Food insecurity - worry: Not on file    Food insecurity - inability: Not on file    Transportation needs - medical: Not on file   [employer] needs - non-medical: Not on file   Occupational History    Not on file   Tobacco Use    Smoking status: Never Smoker    Smokeless tobacco: Never Used   Substance and Sexual Activity    Alcohol use: Yes     Alcohol/week: 0.5 oz     Types: 1 Glasses of wine per week    Drug use: No    Sexual activity: Not on file   Other Topics Concern    Not on file   Social History Narrative    Not on file       Current Outpatient Medications   Medication Sig Dispense Refill    pregabalin (LYRICA) 50 mg capsule Take 1 Cap by mouth two (2) times a day. Max Daily Amount: 100 mg. 42 Cap 0    pregabalin (LYRICA) 50 mg capsule Take 1 Cap by mouth two (2) times a day. Max Daily Amount: 100 mg. 60 Cap 1    multivitamin (ONE A DAY) tablet Take 1 Tab by mouth daily.  cholecalciferol (VITAMIN D3) 1,000 unit cap Take  by mouth daily.  DULoxetine (CYMBALTA) 60 mg capsule Take 1 Cap by mouth daily. Indications: CHRONIC MUSCULOSKELETAL PAIN, NEUROPATHIC PAIN 90 Cap 1    DULoxetine (CYMBALTA) 30 mg capsule Take 1 Cap by mouth daily. Indications: NEUROPATHIC PAIN (Patient not taking: Reported on 6/27/2018) 90 Cap 1    insulin aspart (NOVOLOG FLEXPEN) 100 unit/mL inpn by SubCUTAneous route.  oxyCODONE-acetaminophen (PERCOCET) 5-325 mg per tablet take 1 to 2 tablets by mouth every 4 to 6 hours if needed for pain  0    HYDROcodone-acetaminophen (NORCO) 5-325 mg per tablet Take 1 Tab by mouth every eight (8) hours as needed for Pain. Max Daily Amount: 3 Tabs.  Indications: PAIN 30 Tab 0    traMADol (ULTRAM) 50 mg tablet Take 1 Tab by mouth daily. Max Daily Amount: 50 mg. 30 Tab 0    clonazePAM (KLONOPIN) 0.5 mg tablet       LANTUS SOLOSTAR 100 unit/mL (3 mL) pen       CRESTOR 20 mg tablet       metFORMIN (GLUCOPHAGE) 1,000 mg tablet Take 1,000 mg by mouth two (2) times daily (with meals).  losartan 50 mg tab 100 mg, hydrochlorothiazide 25 mg tab 25 mg Take  by mouth daily.  ACETAMINOPHEN (TYLENOL EXTRA STRENGTH PO) Take  by mouth.  cyclobenzaprine (FLEXERIL) 10 mg tablet Take  by mouth three (3) times daily as needed for Muscle Spasm(s). Allergies   Allergen Reactions    Actos [Pioglitazone] Unable to Obtain    Daypro [Oxaprozin] Unknown (comments)    Lisinopril Unable to Obtain    Other Medication Unable to Obtain     Other allergies are:  Antiinflammatories  Algesics  And several diabetic medications    Topamax [Topiramate] Other (comments)     Unknown reaction    Zocor [Simvastatin] Myalgia          PHYSICAL EXAMINATION    Visit Vitals  /77   Pulse 81   Resp 14   Ht 5' 7\" (1.702 m)   Wt 232 lb 12.8 oz (105.6 kg)   BMI 36.46 kg/m²       CONSTITUTIONAL: NAD, A&O x 3  SENSATION: Intact to light touch throughout  RANGE OF MOTION: The patient has full passive range of motion in all four extremities. MOTOR:  Straight Leg Raise: Negative, bilateral               Hip Flex Knee Ext Knee Flex Ankle DF GTE Ankle PF Tone   Right +4/5 +4/5 +4/5 +4/5 +4/5 +4/5 +4/5   Left +4/5 +4/5 +4/5 +4/5 +4/5 +4/5 +4/5       ASSESSMENT   Diagnoses and all orders for this visit:    1. Spinal stenosis of lumbar region without neurogenic claudication    2. DDD (degenerative disc disease), lumbar          IMPRESSION AND PLAN:  Patient wished to continue her current treatment. I provided her with refills of her Lyrica 50 mg BID. She did not require refills of her Cymbalta 60 mg daily at this time. I offered to increase her dose but she declined. Patient is neurologically intact.  I recommend she continue with her HEP daily. Patient is not interested in surgical intervention or lumbar blocks at this time. I will see the patient back in 3 month's time or earlier if needed. Written by Ro Barrera, as dictated by Tim Shaikh MD  I examined the patient, reviewed and agree with the note.

## 2018-11-06 DIAGNOSIS — M48.061 SPINAL STENOSIS OF LUMBAR REGION WITHOUT NEUROGENIC CLAUDICATION: ICD-10-CM

## 2018-11-06 DIAGNOSIS — M51.36 DDD (DEGENERATIVE DISC DISEASE), LUMBAR: ICD-10-CM

## 2019-01-28 ENCOUNTER — OFFICE VISIT (OUTPATIENT)
Dept: ORTHOPEDIC SURGERY | Age: 59
End: 2019-01-28

## 2019-01-28 VITALS
OXYGEN SATURATION: 96 % | DIASTOLIC BLOOD PRESSURE: 76 MMHG | SYSTOLIC BLOOD PRESSURE: 154 MMHG | HEIGHT: 67 IN | HEART RATE: 84 BPM | BODY MASS INDEX: 36.41 KG/M2 | RESPIRATION RATE: 16 BRPM | WEIGHT: 232 LBS | TEMPERATURE: 97.9 F

## 2019-01-28 DIAGNOSIS — M51.36 DDD (DEGENERATIVE DISC DISEASE), LUMBAR: ICD-10-CM

## 2019-01-28 DIAGNOSIS — M48.061 SPINAL STENOSIS OF LUMBAR REGION WITHOUT NEUROGENIC CLAUDICATION: Primary | ICD-10-CM

## 2019-01-28 RX ORDER — DULOXETIN HYDROCHLORIDE 60 MG/1
60 CAPSULE, DELAYED RELEASE ORAL DAILY
Qty: 90 CAP | Refills: 1 | Status: SHIPPED | OUTPATIENT
Start: 2019-01-28 | End: 2021-01-26 | Stop reason: SDUPTHER

## 2019-01-28 RX ORDER — PREGABALIN 50 MG/1
50 CAPSULE ORAL 2 TIMES DAILY
Qty: 180 CAP | Refills: 1 | Status: SHIPPED | OUTPATIENT
Start: 2019-01-28 | End: 2021-02-26 | Stop reason: SDUPTHER

## 2019-01-28 NOTE — PROGRESS NOTES
[facility] 
[address], [address]  Phone: [phone] Fax: [phone] PROGRESS NOTE HISTORY OF PRESENT ILLNESS: 
The patient is a 62 y.o. female and was seen today for follow up of pain localized to the low back. She denies radicular symptoms at this time. She was previously seen with low back pain radiating into the BLE (L>R) in an S1 distribution to the foot. She reports her pain is a near-constant 9/10. She describes symptoms consistent with spinal stenosis. Her pain was previously low back pain radiating into the BLE in an S1 distribution to the mid thigh. She was previously seen with low back pain into the BLE extending in an S1 distribution to the knees. Pain is exacerbated by standing and ambulating. Per patient, she has a torn meniscus of the right knee which could possibly be contributing to her low back pain. Pt reports her baseline level of pain at 4/10. The patient additional had complaints of neck pain. Pt underwent L3/4 epidural from 1/31/17 reporting some relief but notes prior block offered better relief. Pt underwent L3/4 epidural on 7/25/17 and reported she was symptom-free. Pt underwent an L3/4 epidural on 9/4/18 with no relief at this time. Pt failed NEURONTIN and TOPAMAX. Pt is compliant with Cymbalta 30 mg per day. She reports rare use of Tramadol. Pt was prescribed Klonopin for insomnia but states she has discontinued use. She completed MDP with minimal transient relief. Pt is completing her HEP daily. She has hx of DM and reports monitoring her blood sugars 3x/day. She is followed for her DM by Dr. Kaiser Drummond. She reports her blood sugars are well controlled. She denies hx of glaucoma. At that time, patient was doing well. Note from Kerry Mendoza NP dated 6/27/18 indicating patient was seen with c/o low back and BLE pain, most severe in the bilateral buttocks.  Patient decreased her Cymbalta to 30 mg daily, and did well for a while. She went on vacation and noted an increase in pain following this, so she increased her Cymbalta back to 60 mg daily. She was scheduled to f/u in 6 month's time. Her pain at that time was 4-8/10 with no specific injury or trauma. She was treated with MDP at this time and continued on Cymbalta. Upper extremity EMG per report revealed evidence of moderately severe carpal tunnel syndrome bilaterally, worse on the right. Cervical spine MRI per report revealed degenerative disc disease at multiple levels with foraminal encroachment and uncovertebral joint spurring. Lumbar spine MRI dated 9/24/18 reviewed. Per report, broad-based disc protrusion L3-4 has increased in size in the interval. Associated moderate spinal canal encroachment. Borad-based disc protrusion at L4-5 was present previously. However there is a new extruded disc fragment posterior to the L4 level in the left central region. There is severe spinal canal stenosis with effacement of the thecal sac. Multilevel exit foraminal encroachment as described above. Similar to the prior study at the L4-5 disc space level. Broad-based disc protrusion with posterior osteophytic ridging at L5-S1 similar to the prior study. At her last clinical appointment, patient wished to continue her current treatment. I provided her with refills of her Lyrica 50 mg BID. She did not require refills of her Cymbalta 60 mg daily at that time. The patient returns today with pain location and distribution remain unchanged. She rates her pain 0/10, a slight decrease from her last visit (2/10). She is compliant with Lyrica 50 mg BID and Cymbalta 60 mg daily. She treats with Tylenol prn. She is completing her HEP daily. Pt denies change in bowel or bladder habits. Pt denies dropping things or loss of balance.  reviewed. Body mass index is 36.34 kg/m². PCP: Jaun Ponce MD 
 
 
Past Medical History:  
Diagnosis Date  Arthritis  Cervical radiculopathy  Diabetes ([facility] [address]  Hypercholesteremia  Hypertension Social History Socioeconomic History  Marital status:  Spouse name: Not on file  Number of children: Not on file  Years of education: Not on file  Highest education level: Not on file Social Needs  Financial resource strain: Not on file  Food insecurity - worry: Not on file  Food insecurity - inability: Not on file  Transportation needs - medical: Not on file  Transportation needs - non-medical: Not on file Occupational History  Not on file Tobacco Use  Smoking status: Never Smoker  Smokeless tobacco: Never Used Substance and Sexual Activity  Alcohol use: Yes Alcohol/week: 0.5 oz Types: 1 Glasses of wine per week  Drug use: No  
 Sexual activity: Not on file Other Topics Concern  Not on file Social History Narrative  Not on file Current Outpatient Medications Medication Sig Dispense Refill  DULoxetine (CYMBALTA) 60 mg capsule Take 1 Cap by mouth daily. 90 Cap 0  
 aspirin delayed-release 81 mg tablet Take  by mouth daily.  pregabalin (LYRICA) 50 mg capsule Take 1 Cap by mouth two (2) times a day. Max Daily Amount: 100 mg. 180 Cap 0  pregabalin (LYRICA) 50 mg capsule Take 1 Cap by mouth two (2) times a day. Max Daily Amount: 100 mg. 42 Cap 0  pregabalin (LYRICA) 50 mg capsule Take 1 Cap by mouth two (2) times a day. Max Daily Amount: 100 mg. 60 Cap 1  
 multivitamin (ONE A DAY) tablet Take 1 Tab by mouth daily.  cholecalciferol (VITAMIN D3) 1,000 unit cap Take  by mouth daily.  DULoxetine (CYMBALTA) 30 mg capsule Take 1 Cap by mouth daily. Indications: NEUROPATHIC PAIN (Patient not taking: Reported on 6/27/2018) 90 Cap 1  
 insulin aspart (NOVOLOG FLEXPEN) 100 unit/mL inpn by SubCUTAneous route.     
 oxyCODONE-acetaminophen (PERCOCET) 5-325 mg per tablet take 1 to 2 tablets by mouth every 4 to 6 hours if needed for pain  0  
 HYDROcodone-acetaminophen (NORCO) 5-325 mg per tablet Take 1 Tab by mouth every eight (8) hours as needed for Pain. Max Daily Amount: 3 Tabs. Indications: PAIN (Patient not taking: Reported on 10/26/2018) 30 Tab 0  
 traMADol (ULTRAM) 50 mg tablet Take 1 Tab by mouth daily. Max Daily Amount: 50 mg. (Patient not taking: Reported on 10/26/2018) 30 Tab 0  clonazePAM (KLONOPIN) 0.5 mg tablet  LANTUS SOLOSTAR 100 unit/mL (3 mL) pen  CRESTOR 20 mg tablet  metFORMIN (GLUCOPHAGE) 1,000 mg tablet Take 1,000 mg by mouth two (2) times daily (with meals).  losartan 50 mg tab 100 mg, hydrochlorothiazide 25 mg tab 25 mg Take  by mouth daily.  ACETAMINOPHEN (TYLENOL EXTRA STRENGTH PO) Take  by mouth.  cyclobenzaprine (FLEXERIL) 10 mg tablet Take  by mouth three (3) times daily as needed for Muscle Spasm(s). Allergies Allergen Reactions  Actos [Pioglitazone] Unable to Obtain  Daypro [Oxaprozin] Unknown (comments)  Lisinopril Unable to Obtain  Other Medication Unable to Obtain Other allergies are: Antiinflammatories Algesics And several diabetic medications  Topamax [Topiramate] Other (comments) Unknown reaction  Zocor [Simvastatin] Myalgia PHYSICAL EXAMINATION Visit Vitals /76 Pulse 84 Temp 97.9 °F (36.6 °C) (Oral) Resp 16 Ht 5' 7\" (1.702 m) Wt 232 lb (105.2 kg) SpO2 96% BMI 36.34 kg/m² CONSTITUTIONAL: NAD, A&O x 3 SENSATION: Intact to light touch throughout RANGE OF MOTION: The patient has full passive range of motion in all four extremities. MOTOR: 
Straight Leg Raise: Negative, bilateral 
 
         
 Hip Flex Knee Ext Knee Flex Ankle DF GTE Ankle PF Tone Right +4/5 +4/5 +4/5 +4/5 +4/5 +4/5 +4/5 Left +4/5 +4/5 +4/5 +4/5 +4/5 +4/5 +4/5 ASSESSMENT Diagnoses and all orders for this visit: 
 
 1. Spinal stenosis of lumbar region without neurogenic claudication 2. DDD (degenerative disc disease), lumbar IMPRESSION AND PLAN: 
Patient wished to continue her current treatment. We did discuss the possibility of decreasing her dose of Cymbalta in the future if she continues to do well. I provided her with refills for Cymbalta 60 mg and Lyrica 50 mg. Patient is neurologically intact. I recommend she continue with her HEP daily. I will see the patient back in 6 month's time or earlier if needed. Written by Blanca Cortes, as dictated by Rupinder Horton MD 
I examined the patient, reviewed and agree with the note.

## 2019-07-31 ENCOUNTER — OFFICE VISIT (OUTPATIENT)
Dept: ORTHOPEDIC SURGERY | Age: 59
End: 2019-07-31

## 2019-07-31 VITALS
HEIGHT: 67 IN | BODY MASS INDEX: 35.91 KG/M2 | DIASTOLIC BLOOD PRESSURE: 68 MMHG | SYSTOLIC BLOOD PRESSURE: 128 MMHG | HEART RATE: 88 BPM | TEMPERATURE: 97.6 F | WEIGHT: 228.8 LBS | OXYGEN SATURATION: 98 % | RESPIRATION RATE: 14 BRPM

## 2019-07-31 DIAGNOSIS — M48.061 SPINAL STENOSIS OF LUMBAR REGION WITHOUT NEUROGENIC CLAUDICATION: Primary | ICD-10-CM

## 2019-07-31 DIAGNOSIS — M50.30 DDD (DEGENERATIVE DISC DISEASE), CERVICAL: ICD-10-CM

## 2019-07-31 DIAGNOSIS — M51.36 DDD (DEGENERATIVE DISC DISEASE), LUMBAR: ICD-10-CM

## 2019-07-31 RX ORDER — DULOXETIN HYDROCHLORIDE 60 MG/1
60 CAPSULE, DELAYED RELEASE ORAL DAILY
Qty: 90 CAP | Refills: 1 | Status: SHIPPED | OUTPATIENT
Start: 2019-07-31 | End: 2021-01-26 | Stop reason: SDUPTHER

## 2019-07-31 RX ORDER — PREGABALIN 50 MG/1
50 CAPSULE ORAL 2 TIMES DAILY
Qty: 180 CAP | Refills: 1 | Status: SHIPPED | OUTPATIENT
Start: 2019-07-31 | End: 2021-02-26 | Stop reason: SDUPTHER

## 2019-07-31 NOTE — LETTER
7/31/19 Patient: Leslee Ruff YOB: 1960 Date of Visit: 7/31/2019 MD Hany Marie  
[address] [address] VIA Facsimile: [phone] Dear Rafa Peres MD, Thank you for referring Ms. Leslee Ruff to [address] [place] [address] for evaluation. My notes for this consultation are attached. If you have questions, please do not hesitate to call me. I look forward to following your patient along with you. Sincerely, Marina Mercedes MD

## 2019-07-31 NOTE — PROGRESS NOTES
[facility]  [address], [place]   Phone: [phone]  Fax: [phone]        PROGRESS NOTE      HISTORY OF PRESENT ILLNESS:  The patient is a 61 y.o. female and was seen today for follow up of pain localized to the low back. She denies radicular symptoms at this time. She was previously seen with low back pain radiating into the BLE (L>R) in an S1 distribution to the foot. She reports her pain is a near-constant 9/10. She describes symptoms consistent with spinal stenosis. Her pain was previously low back pain radiating into the BLE in an S1 distribution to the mid thigh. She was previously seen with low back pain into the BLE extending in an S1 distribution to the knees. Pain is exacerbated by standing and ambulating. Per patient, she has a torn meniscus of the right knee which could possibly be contributing to her low back pain. Pt reports her baseline level of pain at 4/10. The patient additionally had complaints of neck pain. Pt underwent L3/4 epidural from 1/31/17 reporting some relief but notes prior block offered better relief. Pt underwent L3/4 epidural on 7/25/17 and reported she was symptom-free. Pt underwent an L3/4 epidural on 9/4/18 with no relief at this time. Pt failed NEURONTIN and TOPAMAX. Pt is compliant with Cymbalta 30 mg per day. She reports rare use of Tramadol. Pt was prescribed Klonopin for insomnia but states she has discontinued use. She completed MDP with minimal transient relief. Pt is completing her HEP daily. She has hx of DM and reports monitoring her blood sugars 3x/day. She is followed for her DM by Dr. Nicholas Gannon. She reports her blood sugars are well controlled. She denies hx of glaucoma. At that time, patient was doing well. Note from Nils Arango NP dated 6/27/18 indicating patient was seen with c/o low back and BLE pain, most severe in the bilateral buttocks.  Patient decreased her Cymbalta to 30 mg daily, and did well for a while. She went on vacation and noted an increase in pain following this, so she increased her Cymbalta back to 60 mg daily. She was scheduled to f/u in 6 month's time. Her pain at that time was 4-8/10 with no specific injury or trauma. She was treated with MDP at this time and continued on Cymbalta. Upper extremity EMG per report revealed evidence of moderately severe carpal tunnel syndrome bilaterally, worse on the right. Cervical spine MRI per report revealed degenerative disc disease at multiple levels with foraminal encroachment and uncovertebral joint spurring. Lumbar spine MRI dated 9/24/18 reviewed. Per report, broad-based disc protrusion L3-4 has increased in size in the interval. Associated moderate spinal canal encroachment. Borad-based disc protrusion at L4-5 was present previously. However there is a new extruded disc fragment posterior to the L4 level in the left central region. There is severe spinal canal stenosis with effacement of the thecal sac. Multilevel exit foraminal encroachment as described above. Similar to the prior study at the L4-5 disc space level. Broad-based disc protrusion with posterior osteophytic ridging at L5-S1 similar to the prior study. At her last clinical appointment, patient wished to continue her current treatment. We discussed the possibility of decreasing her dose of Cymbalta in the future if she continues to do well. I provided her with refills for Cymbalta 60 mg and Lyrica 50 mg. The patient returns today with paraesthesias in her shoulder, however her low back pain has improved. She continues to rate her pain 0/10. She reports an increase in knee pain. Reports she had knee surgery x 1 year ago and has an appointment with her surgeon coming up. She is compliant with Lyrica 50 mg BID and Cymbalta 60 mg daily. She treats with Tylenol prn. She is completing her HEP daily.  The patient has a history of DM and reports blood sugars are well controlled, consistently remaining below 200. Pt denies change in bowel or bladder habits. Pt denies dropping things or loss of balance.  reviewed. Body mass index is 35.84 kg/m². PCP: Lori Hancock MD      Past Medical History:   Diagnosis Date    Arthritis     Cervical radiculopathy     Diabetes ([address]     Hypercholesteremia     Hypertension         Social History     Socioeconomic History    Marital status:      Spouse name: Not on file    Number of children: Not on file    Years of education: Not on file    Highest education level: Not on file   Occupational History    Not on file   Social Needs    Financial resource strain: Not on file    Food insecurity:     Worry: Not on file     Inability: Not on file    Transportation needs:     Medical: Not on file     Non-medical: Not on file   Tobacco Use    Smoking status: Never Smoker    Smokeless tobacco: Never Used   Substance and Sexual Activity    Alcohol use: Yes     Alcohol/week: 0.8 standard drinks     Types: 1 Glasses of wine per week    Drug use: No    Sexual activity: Not on file   Lifestyle    Physical activity:     Days per week: Not on file     Minutes per session: Not on file    Stress: Not on file   Relationships    Social connections:     Talks on phone: Not on file     Gets together: Not on file     Attends Yazidi service: Not on file     Active member of club or organization: Not on file     Attends meetings of clubs or organizations: Not on file     Relationship status: Not on file    Intimate partner violence:     Fear of current or ex partner: Not on file     Emotionally abused: Not on file     Physically abused: Not on file     Forced sexual activity: Not on file   Other Topics Concern    Not on file   Social History Narrative    Not on file       Current Outpatient Medications   Medication Sig Dispense Refill    DULoxetine (CYMBALTA) 60 mg capsule Take 1 Cap by mouth daily.  90 Cap 1    pregabalin (LYRICA) 50 mg capsule Take 1 Cap by mouth two (2) times a day. Max Daily Amount: 100 mg. 180 Cap 1    DULoxetine (CYMBALTA) 60 mg capsule Take 1 Cap by mouth daily. 90 Cap 0    aspirin delayed-release 81 mg tablet Take  by mouth daily.  pregabalin (LYRICA) 50 mg capsule Take 1 Cap by mouth two (2) times a day. Max Daily Amount: 100 mg. 42 Cap 0    multivitamin (ONE A DAY) tablet Take 1 Tab by mouth daily.  cholecalciferol (VITAMIN D3) 1,000 unit cap Take  by mouth daily.  insulin aspart (NOVOLOG FLEXPEN) 100 unit/mL inpn by SubCUTAneous route.  LANTUS SOLOSTAR 100 unit/mL (3 mL) pen       CRESTOR 20 mg tablet       metFORMIN (GLUCOPHAGE) 1,000 mg tablet Take 1,000 mg by mouth two (2) times daily (with meals).  losartan 50 mg tab 100 mg, hydrochlorothiazide 25 mg tab 25 mg Take  by mouth daily.  ACETAMINOPHEN (TYLENOL EXTRA STRENGTH PO) Take  by mouth.  DULoxetine (CYMBALTA) 30 mg capsule Take 1 Cap by mouth daily. Indications: NEUROPATHIC PAIN (Patient not taking: Reported on 6/27/2018) 90 Cap 1    oxyCODONE-acetaminophen (PERCOCET) 5-325 mg per tablet take 1 to 2 tablets by mouth every 4 to 6 hours if needed for pain  0    HYDROcodone-acetaminophen (NORCO) 5-325 mg per tablet Take 1 Tab by mouth every eight (8) hours as needed for Pain. Max Daily Amount: 3 Tabs. Indications: PAIN (Patient not taking: Reported on 10/26/2018) 30 Tab 0    traMADol (ULTRAM) 50 mg tablet Take 1 Tab by mouth daily. Max Daily Amount: 50 mg. (Patient not taking: Reported on 10/26/2018) 30 Tab 0    clonazePAM (KLONOPIN) 0.5 mg tablet       cyclobenzaprine (FLEXERIL) 10 mg tablet Take  by mouth three (3) times daily as needed for Muscle Spasm(s).          Allergies   Allergen Reactions    Actos [Pioglitazone] Unable to Obtain    Daypro [Oxaprozin] Unknown (comments)    Lisinopril Unable to Obtain    Other Medication Unable to Obtain     Other allergies are:  Antiinflammatories  Algesics  And several diabetic medications    Topamax [Topiramate] Other (comments)     Unknown reaction    Zocor [Simvastatin] Myalgia          PHYSICAL EXAMINATION    Visit Vitals  /68   Pulse 88   Temp 97.6 °F (36.4 °C) (Oral)   Resp 14   Ht 5' 7\" (1.702 m)   Wt 228 lb 12.8 oz (103.8 kg)   SpO2 98%   BMI 35.84 kg/m²       CONSTITUTIONAL: NAD, A&O x 3  SENSATION: Intact to light touch throughout  NEURO: Long's is positive, left  RANGE OF MOTION: The patient has full passive range of motion in all four extremities. Shoulder AB/Flex Elbow Flex Wrist Ext Elbow Ext Wrist Flex Hand Intrin Tone   Right +4/5 +4/5 +4/5 +4/5 +4/5 +4/5 +4/5   Left +4/5 +4/5 +4/5 +4/5 +4/5 +4/5 +4/5                 ASSESSMENT   Diagnoses and all orders for this visit:    1. Spinal stenosis of lumbar region without neurogenic claudication    2. DDD (degenerative disc disease), lumbar    3. DDD (degenerative disc disease), cervical          IMPRESSION AND PLAN:  The patient returns today with paraesthesias in her left shoulder, however her low back pain has improved. Patient wished to continue her current treatment. I provided her with refills of Lyrica 50 mg BID and Cymbalta 60 mg daily. I recommend she continue to perform her HEP daily. Patient is neurologically intact. I will see the patient back in 6 month's time or earlier if needed. Written by Haze Hodgkins, as dictated by Yuridia Conklin MD  I examined the patient, reviewed and agree with the note.

## 2020-01-22 NOTE — PROGRESS NOTES
[facility]  [address], [address]  Phone: [phone]  Fax: [phone]        PROGRESS NOTE      HISTORY OF PRESENT ILLNESS:  The patient is a 61 y.o. female and was seen today for follow up of low back pain. Previously, she was seen with c/o paraesthesias in her shoulder. Previously, she was seen for pain localized to the low back. She denied radicular symptoms at the time. She was previously seen with low back pain radiating into the BLE (L>R) in an S1 distribution to the foot. She reports her pain is a near-constant 9/10. She describes symptoms consistent with spinal stenosis. Her pain was previously low back pain radiating into the BLE in an S1 distribution to the mid thigh. She was previously seen with low back pain into the BLE extending in an S1 distribution to the knees. Pain is exacerbated by standing and ambulating. Per patient, she has a torn meniscus of the right knee which could possibly be contributing to her low back pain. Pt reports her baseline level of pain at 4/10. The patient additionally had complaints of neck pain. Pt underwent L3/4 epidural from 1/31/17 reporting some relief but notes prior block offered better relief. Pt underwent L3/4 epidural on 7/25/17 and reported she was symptom-free. Pt underwent an L3/4 epidural on 9/4/18 with no relief at this time. Pt failed NEURONTIN and TOPAMAX. Pt is compliant with Cymbalta 30 mg per day. She reports rare use of Tramadol. Pt was prescribed Klonopin for insomnia but states she has discontinued use. She completed MDP with minimal transient relief. Pt is completing her HEP daily. She has hx of DM and reports monitoring her blood sugars 3x/day. She is followed for her DM by Dr. Anderson Molina. She reports her blood sugars are well controlled. She denies hx of glaucoma. At that time, patient was doing well.  Note from Jose Antonio Law NP dated 6/27/18 indicating patient was seen with c/o low back and BLE pain, most severe in the bilateral buttocks. Patient decreased her Cymbalta to 30 mg daily, and did well for a while. She went on vacation and noted an increase in pain following this, so she increased her Cymbalta back to 60 mg daily. She was scheduled to f/u in 6 month's time. Her pain at that time was 4-8/10 with no specific injury or trauma. She was treated with MDP at this time and continued on Cymbalta. Upper extremity EMG per report revealed evidence of moderately severe carpal tunnel syndrome bilaterally, worse on the right. Cervical spine MRI per report revealed degenerative disc disease at multiple levels with foraminal encroachment and uncovertebral joint spurring. Lumbar spine MRI dated 9/24/18 reviewed. Per report, broad-based disc protrusion L3-4 has increased in size in the interval. Associated moderate spinal canal encroachment. Borad-based disc protrusion at L4-5 was present previously. However there is a new extruded disc fragment posterior to the L4 level in the left central region. There is severe spinal canal stenosis with effacement of the thecal sac. Multilevel exit foraminal encroachment as described above. Similar to the prior study at the L4-5 disc space level. Broad-based disc protrusion with posterior osteophytic ridging at L5-S1 similar to the prior study. At her last clinical appointment, patient presented with improved low back pain and continued to have paraesthesias in her left shoulder. She wished to continue her current treatment. I provided her with refills of Lyrica 50 mg BID and Cymbalta 60 mg daily.      The patient returns today with pain location and distribution remain unchanged. She rates her pain 0/10, unchanged. She continues not to have any low back pain and reports her neck pain is doing well at this time. Patient is compliant with her HEP, Lyrica 50 mg BID and Cymbalta 60 mg daily. Pt denies change in bowel or bladder habits.  She previously received an injection from Dr. Isa Matta 7/2019 with relief.  reviewed. Body mass index is 36.09 kg/m². PCP: Kendra Lopez MD      Past Medical History:   Diagnosis Date    Arthritis     Cervical radiculopathy     Diabetes ([address]     Hypercholesteremia     Hypertension         Social History     Socioeconomic History    Marital status:      Spouse name: Not on file    Number of children: Not on file    Years of education: Not on file    Highest education level: Not on file   Occupational History    Not on file   Social Needs    Financial resource strain: Not on file    Food insecurity:     Worry: Not on file     Inability: Not on file    Transportation needs:     Medical: Not on file     Non-medical: Not on file   Tobacco Use    Smoking status: Never Smoker    Smokeless tobacco: Never Used   Substance and Sexual Activity    Alcohol use: Yes     Alcohol/week: 0.8 standard drinks     Types: 1 Glasses of wine per week    Drug use: No    Sexual activity: Not on file   Lifestyle    Physical activity:     Days per week: Not on file     Minutes per session: Not on file    Stress: Not on file   Relationships    Social connections:     Talks on phone: Not on file     Gets together: Not on file     Attends Latter-day service: Not on file     Active member of club or organization: Not on file     Attends meetings of clubs or organizations: Not on file     Relationship status: Not on file    Intimate partner violence:     Fear of current or ex partner: Not on file     Emotionally abused: Not on file     Physically abused: Not on file     Forced sexual activity: Not on file   Other Topics Concern    Not on file   Social History Narrative    Not on file       Current Outpatient Medications   Medication Sig Dispense Refill    glucose blood VI test strips (ASCENSIA AUTODISC VI, ONE TOUCH ULTRA TEST VI) strip 50 Each.  Blood-Glucose Meter misc 1 Each.       ibuprofen (MOTRIN) 600 mg tablet Take 600 mg by mouth.      HUMALOG KWIKPEN INSULIN 100 unit/mL kwikpen INJECT 6 TO 8 UNITS SUBCUTANEOUSLY THREE TIMES DAILY BEFORE MEALS      insulin lispro (HUMALOG) 100 unit/mL kwikpen Inject  6-8 units sub-q TID AC      ketorolac (ACULAR) 0.5 % ophthalmic solution INSTILL 1 DROP INTO RIGHT EYE THREE TIMES DAILY      lancets (ONE TOUCH DELICA) 33 gauge misc USE TO CHECK BLOOD SUGAR THREE TIMES A DAY      ONETOUCH DELICA PLUS LANCET 33 gauge misc USE 1 TO CHECK GLUCOSE THREE TIMES DAILY      losartan-hydroCHLOROthiazide (HYZAAR) 100-25 mg per tablet Take 1 Tab by mouth.  magnesium oxide (MAG-OX) 400 mg tablet Take 400 mg by mouth.  METHYL SALICYLATE-MENTHOL EX Apply  to affected area.  Insulin Needles, Disposable, (EVAN PEN NEEDLE) 32 gauge x 5/32\" ndle USE TO INJECT INSULIN FOUR TIMES A DAY      DULoxetine (CYMBALTA) 60 mg capsule Take 1 Cap by mouth daily. 90 Cap 1    DULoxetine (CYMBALTA) 60 mg capsule Take 1 Cap by mouth daily. 90 Cap 1    pregabalin (LYRICA) 50 mg capsule Take 1 Cap by mouth two (2) times a day. Max Daily Amount: 100 mg. 180 Cap 1    aspirin delayed-release 81 mg tablet Take  by mouth daily.  multivitamin (ONE A DAY) tablet Take 1 Tab by mouth daily.  cholecalciferol (VITAMIN D3) 1,000 unit cap Take  by mouth daily.  insulin aspart (NOVOLOG FLEXPEN) 100 unit/mL inpn by SubCUTAneous route.  LANTUS SOLOSTAR 100 unit/mL (3 mL) pen       CRESTOR 20 mg tablet       metFORMIN (GLUCOPHAGE) 1,000 mg tablet Take 1,000 mg by mouth two (2) times daily (with meals).  losartan 50 mg tab 100 mg, hydrochlorothiazide 25 mg tab 25 mg Take  by mouth daily.  ACETAMINOPHEN (TYLENOL EXTRA STRENGTH PO) Take  by mouth.  DULoxetine (CYMBALTA) 60 mg capsule Take 1 Cap by mouth daily. 90 Cap 1    pregabalin (LYRICA) 50 mg capsule Take 1 Cap by mouth two (2) times a day. Max Daily Amount: 100 mg. 180 Cap 1    DULoxetine (CYMBALTA) 60 mg capsule Take 1 Cap by mouth daily. 90 Cap 0    pregabalin (LYRICA) 50 mg capsule Take 1 Cap by mouth two (2) times a day. Max Daily Amount: 100 mg. 42 Cap 0    DULoxetine (CYMBALTA) 30 mg capsule Take 1 Cap by mouth daily. Indications: NEUROPATHIC PAIN (Patient not taking: Reported on 6/27/2018) 90 Cap 1    oxyCODONE-acetaminophen (PERCOCET) 5-325 mg per tablet take 1 to 2 tablets by mouth every 4 to 6 hours if needed for pain  0    HYDROcodone-acetaminophen (NORCO) 5-325 mg per tablet Take 1 Tab by mouth every eight (8) hours as needed for Pain. Max Daily Amount: 3 Tabs. Indications: PAIN (Patient not taking: Reported on 10/26/2018) 30 Tab 0    traMADol (ULTRAM) 50 mg tablet Take 1 Tab by mouth daily. Max Daily Amount: 50 mg. (Patient not taking: Reported on 10/26/2018) 30 Tab 0    clonazePAM (KLONOPIN) 0.5 mg tablet       cyclobenzaprine (FLEXERIL) 10 mg tablet Take  by mouth three (3) times daily as needed for Muscle Spasm(s). Allergies   Allergen Reactions    Actos [Pioglitazone] Unable to Obtain    Daypro [Oxaprozin] Unknown (comments)    Lisinopril Unable to Obtain    Other Medication Unable to Obtain     Other allergies are:  Antiinflammatories  Algesics  And several diabetic medications    Topamax [Topiramate] Other (comments)     Unknown reaction    Zocor [Simvastatin] Myalgia          PHYSICAL EXAMINATION    Visit Vitals  /70 (BP 1 Location: Left arm, BP Patient Position: Sitting)   Pulse 72   Ht 5' 7\" (1.702 m)   Wt 230 lb 6.4 oz (104.5 kg)   SpO2 98%   BMI 36.09 kg/m²       CONSTITUTIONAL: NAD, A&O x 3  SENSATION: Intact to light touch throughout  RANGE OF MOTION: The patient has full passive range of motion in all four extremities. MOTOR:  Straight Leg Raise: Negative, bilateral      Hip Flex Knee Ext Knee Flex Ankle DF GTE Ankle PF Tone   Right +4/5 +4/5 +4/5 +4/5 +4/5 +4/5 +4/5   Left +4/5 +4/5 +4/5 +4/5 +4/5 +4/5 +4/5       ASSESSMENT   Diagnoses and all orders for this visit:    1.  Spinal stenosis of lumbar region without neurogenic claudication  -     DULoxetine (CYMBALTA) 60 mg capsule; Take 1 Cap by mouth daily. -     pregabalin (LYRICA) 50 mg capsule; Take 1 Cap by mouth two (2) times a day. Max Daily Amount: 100 mg.    2. DDD (degenerative disc disease), lumbar  -     DULoxetine (CYMBALTA) 60 mg capsule; Take 1 Cap by mouth daily. -     pregabalin (LYRICA) 50 mg capsule; Take 1 Cap by mouth two (2) times a day. Max Daily Amount: 100 mg.    3. DDD (degenerative disc disease), cervical  -     DULoxetine (CYMBALTA) 60 mg capsule; Take 1 Cap by mouth daily. -     pregabalin (LYRICA) 50 mg capsule; Take 1 Cap by mouth two (2) times a day. Max Daily Amount: 100 mg. IMPRESSION AND PLAN:  Patient returns to the office today with c/o low back pain. Multiple treatment options were discussed. I instructed the patient on how to decrease her Lyrica as tolerated. I provided her refills of Lyrica 50 mg and Cymbalta 60 mg. Patient is neurologically intact. I will see the patient back in 6 month's time or earlier if needed. Written by Filiberto Montanez, as dictated by Kaity Boucher MD  I examined the patient, reviewed and agree with the note.

## 2020-01-24 ENCOUNTER — OFFICE VISIT (OUTPATIENT)
Dept: ORTHOPEDIC SURGERY | Age: 60
End: 2020-01-24

## 2020-01-24 VITALS
DIASTOLIC BLOOD PRESSURE: 70 MMHG | HEART RATE: 72 BPM | SYSTOLIC BLOOD PRESSURE: 136 MMHG | HEIGHT: 67 IN | WEIGHT: 230.4 LBS | OXYGEN SATURATION: 98 % | BODY MASS INDEX: 36.16 KG/M2

## 2020-01-24 DIAGNOSIS — M50.30 DDD (DEGENERATIVE DISC DISEASE), CERVICAL: ICD-10-CM

## 2020-01-24 DIAGNOSIS — M51.36 DDD (DEGENERATIVE DISC DISEASE), LUMBAR: ICD-10-CM

## 2020-01-24 DIAGNOSIS — M48.061 SPINAL STENOSIS OF LUMBAR REGION WITHOUT NEUROGENIC CLAUDICATION: Primary | ICD-10-CM

## 2020-01-24 RX ORDER — BLOOD-GLUCOSE METER
1 EACH MISCELLANEOUS
COMMUNITY
Start: 2018-07-20

## 2020-01-24 RX ORDER — PREGABALIN 50 MG/1
50 CAPSULE ORAL 2 TIMES DAILY
Qty: 180 CAP | Refills: 1 | Status: SHIPPED | OUTPATIENT
Start: 2020-01-24 | End: 2021-02-26 | Stop reason: SDUPTHER

## 2020-01-24 RX ORDER — LANCETS 33 GAUGE
EACH MISCELLANEOUS
COMMUNITY
Start: 2019-10-14

## 2020-01-24 RX ORDER — LOSARTAN POTASSIUM AND HYDROCHLOROTHIAZIDE 25; 100 MG/1; MG/1
1 TABLET ORAL DAILY
COMMUNITY
Start: 2019-07-02

## 2020-01-24 RX ORDER — KETOROLAC TROMETHAMINE 5 MG/ML
SOLUTION OPHTHALMIC
COMMUNITY
Start: 2019-11-19

## 2020-01-24 RX ORDER — PEN NEEDLE, DIABETIC 31 GX3/16"
NEEDLE, DISPOSABLE MISCELLANEOUS
COMMUNITY
Start: 2019-07-23

## 2020-01-24 RX ORDER — LANOLIN ALCOHOL/MO/W.PET/CERES
400 CREAM (GRAM) TOPICAL DAILY
COMMUNITY

## 2020-01-24 RX ORDER — LANCETS 33 GAUGE
EACH MISCELLANEOUS
COMMUNITY
Start: 2019-12-09

## 2020-01-24 RX ORDER — IBUPROFEN 600 MG/1
600 TABLET ORAL
COMMUNITY
Start: 2019-07-16

## 2020-01-24 RX ORDER — DULOXETIN HYDROCHLORIDE 60 MG/1
60 CAPSULE, DELAYED RELEASE ORAL DAILY
Qty: 90 CAP | Refills: 1 | Status: SHIPPED | OUTPATIENT
Start: 2020-01-24 | End: 2021-01-26 | Stop reason: SDUPTHER

## 2020-01-24 RX ORDER — INSULIN LISPRO 100 [IU]/ML
INJECTION, SOLUTION INTRAVENOUS; SUBCUTANEOUS
COMMUNITY
Start: 2019-08-01 | End: 2021-04-30 | Stop reason: SDUPTHER

## 2020-01-24 RX ORDER — INSULIN LISPRO 100 [IU]/ML
INJECTION, SOLUTION INTRAVENOUS; SUBCUTANEOUS
COMMUNITY
Start: 2019-12-09

## 2020-01-24 NOTE — LETTER
1/24/20 Patient: Ariel Eckert YOB: 1960 Date of Visit: 1/24/2020 MD [address] 
[address] [place] [address] VIA Facsimile: [phone] Dear Dante Deleon MD, Thank you for referring Ms. Ariel Eckert to [address] for evaluation. My notes for this consultation are attached. If you have questions, please do not hesitate to call me. I look forward to following your patient along with you. Sincerely, Donny Arnold MD

## 2020-07-27 ENCOUNTER — OFFICE VISIT (OUTPATIENT)
Dept: ORTHOPEDIC SURGERY | Age: 60
End: 2020-07-27

## 2020-07-27 VITALS
BODY MASS INDEX: 36.63 KG/M2 | TEMPERATURE: 98.5 F | HEIGHT: 67 IN | WEIGHT: 233.4 LBS | OXYGEN SATURATION: 98 % | SYSTOLIC BLOOD PRESSURE: 146 MMHG | DIASTOLIC BLOOD PRESSURE: 62 MMHG | HEART RATE: 86 BPM

## 2020-07-27 DIAGNOSIS — M51.36 DDD (DEGENERATIVE DISC DISEASE), LUMBAR: ICD-10-CM

## 2020-07-27 DIAGNOSIS — M48.061 SPINAL STENOSIS OF LUMBAR REGION WITHOUT NEUROGENIC CLAUDICATION: Primary | ICD-10-CM

## 2020-07-27 DIAGNOSIS — M50.30 DDD (DEGENERATIVE DISC DISEASE), CERVICAL: ICD-10-CM

## 2020-07-27 RX ORDER — DULOXETIN HYDROCHLORIDE 60 MG/1
60 CAPSULE, DELAYED RELEASE ORAL DAILY
Qty: 90 CAP | Refills: 1 | Status: SHIPPED | OUTPATIENT
Start: 2020-07-27 | End: 2021-01-26 | Stop reason: SDUPTHER

## 2020-07-27 RX ORDER — PREGABALIN 50 MG/1
50 CAPSULE ORAL 2 TIMES DAILY
Qty: 180 CAP | Refills: 1 | Status: SHIPPED | OUTPATIENT
Start: 2020-07-27 | End: 2021-02-26 | Stop reason: SDUPTHER

## 2020-07-27 NOTE — LETTER
7/27/20 Patient: Brandon Narvaez YOB: 1960 Date of Visit: 7/27/2020 MD [address] 
[address] [facility] [place] [address] VIA Facsimile: [phone] Dear Muna Crawford MD, Thank you for referring Ms. Brandon Narvaez to [address] for evaluation. My notes for this consultation are attached. If you have questions, please do not hesitate to call me. I look forward to following your patient along with you. Sincerely, Radha Oconnell MD

## 2020-07-27 NOTE — PROGRESS NOTES
[facility]  [address], [place]   Phone: [phone]  Fax: [phone]        PROGRESS NOTE      HISTORY OF PRESENT ILLNESS:  The patient is a 61 y.o. female and was seen today for follow up of low back pain. Previously, she was seen with c/o paraesthesias in her shoulder. Previously, she was seen for pain localized to the low back. She denied radicular symptoms at the time. She was previously seen with low back pain radiating into the BLE (L>R) in an S1 distribution to the foot. She reports her pain is a near-constant 9/10. She describes symptoms consistent with spinal stenosis. Her pain was previously low back pain radiating into the BLE in an S1 distribution to the mid thigh. She was previously seen with low back pain into the BLE extending in an S1 distribution to the knees. Pain is exacerbated by standing and ambulating. Per patient, she has a torn meniscus of the right knee which could possibly be contributing to her low back pain. Pt reports her baseline level of pain at 4/10. The patient additionally had complaints of neck pain. Pt underwent L3/4 epidural from 1/31/17 reporting some relief but notes prior block offered better relief. Pt underwent L3/4 epidural on 7/25/17 and reported she was symptom-free. Pt underwent an L3/4 epidural on 9/4/18 with no relief at this time. She previously received a knee injection from Dr. Christiano Rose 7/2019 with relief. Pt failed NEURONTIN and TOPAMAX. Pt is compliant with Cymbalta 30 mg per day. She reports rare use of Tramadol. Pt was prescribed Klonopin for insomnia but states she has discontinued use. She completed MDP with minimal transient relief. Pt is completing her HEP daily. She has hx of DM and reports monitoring her blood sugars 3x/day. She is followed for her DM by Dr. Lisa Ryan. She reports her blood sugars are well controlled. She denies hx of glaucoma. At that time, patient was doing well.  Note from [facility] GRIFFIN Haley dated 6/27/18 indicating patient was seen with c/o low back and BLE pain, most severe in the bilateral buttocks. Patient decreased her Cymbalta to 30 mg daily, and did well for a while. She went on vacation and noted an increase in pain following this, so she increased her Cymbalta back to 60 mg daily. She was scheduled to f/u in 6 month's time. Her pain at that time was 4-8/10 with no specific injury or trauma. She was treated with MDP at this time and continued on Cymbalta. Upper extremity EMG per report revealed evidence of moderately severe carpal tunnel syndrome bilaterally, worse on the right. Cervical spine MRI per report revealed degenerative disc disease at multiple levels with foraminal encroachment and uncovertebral joint spurring. Lumbar spine MRI dated 9/24/18 reviewed. Per report, broad-based disc protrusion L3-4 has increased in size in the interval. Associated moderate spinal canal encroachment. Borad-based disc protrusion at L4-5 was present previously. However there is a new extruded disc fragment posterior to the L4 level in the left central region. There is severe spinal canal stenosis with effacement of the thecal sac. Multilevel exit foraminal encroachment as described above. Similar to the prior study at the L4-5 disc space level. Broad-based disc protrusion with posterior osteophytic ridging at L5-S1 similar to the prior study. At her last clinical appointment, I instructed the patient on how to decrease her Lyrica as tolerated. I provided her refills of Lyrica 50 mg and Cymbalta 60 mg.     The patient returns today with low back and bilateral hip (R>L) pain. She rates her pain 4/10, previously 0/10. Pt decreased her Lyrica to once a day x 2 months but experienced an increase in pain. She increased back to BID. She associates her increased pain score with returning to work and being more active. She is compliant with her HEP. Pt denies change in bowel or bladder habits.  Pt denies dropping things or loss of balance.  reviewed. Body mass index is 36.56 kg/m². PCP: Marguerite Tobias MD      Past Medical History:   Diagnosis Date    Arthritis     Cervical radiculopathy     Diabetes ([facility] [address]     Hypercholesteremia     Hypertension         Social History     Socioeconomic History    Marital status:      Spouse name: Not on file    Number of children: Not on file    Years of education: Not on file    Highest education level: Not on file   Occupational History    Not on file   Social Needs    Financial resource strain: Not on file    Food insecurity     Worry: Not on file     Inability: Not on file    Transportation needs     Medical: Not on file     Non-medical: Not on file   Tobacco Use    Smoking status: Never Smoker    Smokeless tobacco: Never Used   Substance and Sexual Activity    Alcohol use: Yes     Alcohol/week: 0.8 standard drinks     Types: 1 Glasses of wine per week    Drug use: No    Sexual activity: Not on file   Lifestyle    Physical activity     Days per week: Not on file     Minutes per session: Not on file    Stress: Not on file   Relationships    Social connections     Talks on phone: Not on file     Gets together: Not on file     Attends Hinduism service: Not on file     Active member of club or organization: Not on file     Attends meetings of clubs or organizations: Not on file     Relationship status: Not on file    Intimate partner violence     Fear of current or ex partner: Not on file     Emotionally abused: Not on file     Physically abused: Not on file     Forced sexual activity: Not on file   Other Topics Concern    Not on file   Social History Narrative    Not on file       Current Outpatient Medications   Medication Sig Dispense Refill    DULoxetine (CYMBALTA) 60 mg capsule Take 1 Cap by mouth daily. 90 Cap 1    glucose blood VI test strips (ASCENSIA AUTODISC VI, ONE TOUCH ULTRA TEST VI) strip 50 Each.       ibuprofen (MOTRIN) 600 mg tablet Take 600 mg by mouth.  HUMALOG KWIKPEN INSULIN 100 unit/mL kwikpen INJECT 6 TO 8 UNITS SUBCUTANEOUSLY THREE TIMES DAILY BEFORE MEALS      insulin lispro (HUMALOG) 100 unit/mL kwikpen Inject  6-8 units sub-q TID AC      ketorolac (ACULAR) 0.5 % ophthalmic solution INSTILL 1 DROP INTO RIGHT EYE THREE TIMES DAILY      ONETOUCH DELICA PLUS LANCET 33 gauge misc USE 1 TO CHECK GLUCOSE THREE TIMES DAILY      losartan-hydroCHLOROthiazide (HYZAAR) 100-25 mg per tablet Take 1 Tab by mouth.  magnesium oxide (MAG-OX) 400 mg tablet Take 400 mg by mouth.  DULoxetine (CYMBALTA) 60 mg capsule Take 1 Cap by mouth daily. 90 Cap 1    pregabalin (LYRICA) 50 mg capsule Take 1 Cap by mouth two (2) times a day. Max Daily Amount: 100 mg. 180 Cap 1    DULoxetine (CYMBALTA) 60 mg capsule Take 1 Cap by mouth daily. 90 Cap 1    pregabalin (LYRICA) 50 mg capsule Take 1 Cap by mouth two (2) times a day. Max Daily Amount: 100 mg. 180 Cap 1    DULoxetine (CYMBALTA) 60 mg capsule Take 1 Cap by mouth daily. 90 Cap 1    pregabalin (LYRICA) 50 mg capsule Take 1 Cap by mouth two (2) times a day. Max Daily Amount: 100 mg. 180 Cap 1    DULoxetine (CYMBALTA) 60 mg capsule Take 1 Cap by mouth daily. 90 Cap 0    aspirin delayed-release 81 mg tablet Take  by mouth daily.  pregabalin (LYRICA) 50 mg capsule Take 1 Cap by mouth two (2) times a day. Max Daily Amount: 100 mg. 42 Cap 0    multivitamin (ONE A DAY) tablet Take 1 Tab by mouth daily.  cholecalciferol (VITAMIN D3) 1,000 unit cap Take  by mouth daily.  insulin aspart (NOVOLOG FLEXPEN) 100 unit/mL inpn by SubCUTAneous route.  LANTUS SOLOSTAR 100 unit/mL (3 mL) pen       CRESTOR 20 mg tablet       metFORMIN (GLUCOPHAGE) 1,000 mg tablet Take 1,000 mg by mouth two (2) times daily (with meals).  ACETAMINOPHEN (TYLENOL EXTRA STRENGTH PO) Take  by mouth.  Blood-Glucose Meter misc 1 Each.       lancets (ONE TOUCH DELICA) 33 gauge misc USE TO CHECK BLOOD SUGAR THREE TIMES A DAY      METHYL SALICYLATE-MENTHOL EX Apply  to affected area.  Insulin Needles, Disposable, (EVAN PEN NEEDLE) 32 gauge x 5/32\" ndle USE TO INJECT INSULIN FOUR TIMES A DAY      DULoxetine (CYMBALTA) 30 mg capsule Take 1 Cap by mouth daily. Indications: NEUROPATHIC PAIN (Patient not taking: Reported on 6/27/2018) 90 Cap 1    oxyCODONE-acetaminophen (PERCOCET) 5-325 mg per tablet take 1 to 2 tablets by mouth every 4 to 6 hours if needed for pain  0    HYDROcodone-acetaminophen (NORCO) 5-325 mg per tablet Take 1 Tab by mouth every eight (8) hours as needed for Pain. Max Daily Amount: 3 Tabs. Indications: PAIN (Patient not taking: Reported on 10/26/2018) 30 Tab 0    traMADol (ULTRAM) 50 mg tablet Take 1 Tab by mouth daily. Max Daily Amount: 50 mg. (Patient not taking: Reported on 10/26/2018) 30 Tab 0    clonazePAM (KLONOPIN) 0.5 mg tablet       losartan 50 mg tab 100 mg, hydrochlorothiazide 25 mg tab 25 mg Take  by mouth daily.  cyclobenzaprine (FLEXERIL) 10 mg tablet Take  by mouth three (3) times daily as needed for Muscle Spasm(s). Allergies   Allergen Reactions    Actos [Pioglitazone] Unable to Obtain    Daypro [Oxaprozin] Unknown (comments)    Lisinopril Unable to Obtain    Other Medication Unable to Obtain     Other allergies are:  Antiinflammatories  Algesics  And several diabetic medications    Topamax [Topiramate] Other (comments)     Unknown reaction    Zocor [Simvastatin] Myalgia          PHYSICAL EXAMINATION    Visit Vitals  /62 (BP 1 Location: Left arm, BP Patient Position: Sitting)   Pulse 86   Temp 98.5 °F (36.9 °C) (Temporal)   Ht 5' 7\" (1.702 m)   Wt 233 lb 6.4 oz (105.9 kg)   SpO2 98%   BMI 36.56 kg/m²       CONSTITUTIONAL: NAD, A&O x 3  SENSATION: Intact to light touch throughout  RANGE OF MOTION: The patient has full passive range of motion in all four extremities.   MOTOR:  Straight Leg Raise: Negative, bilateral Shoulder AB/Flex Elbow Flex Wrist Ext Elbow Ext Wrist Flex Hand Intrin Tone   Right +4/5 +4/5 +4/5 +4/5 +4/5 +4/5 +4/5   Left +4/5 +4/5 +4/5 +4/5 +4/5 +4/5 +4/5              Hip Flex Knee Ext Knee Flex Ankle DF GTE Ankle PF Tone   Right +4/5 +4/5 +4/5 +4/5 +4/5 +4/5 +4/5   Left +4/5 +4/5 +4/5 +4/5 +4/5 +4/5 +4/5       ASSESSMENT   Diagnoses and all orders for this visit:    1. Spinal stenosis of lumbar region without neurogenic claudication  -     DULoxetine (CYMBALTA) 60 mg capsule; Take 1 Cap by mouth daily. -     pregabalin (LYRICA) 50 mg capsule; Take 1 Cap by mouth two (2) times a day. Max Daily Amount: 100 mg.    2. DDD (degenerative disc disease), lumbar  -     DULoxetine (CYMBALTA) 60 mg capsule; Take 1 Cap by mouth daily. -     pregabalin (LYRICA) 50 mg capsule; Take 1 Cap by mouth two (2) times a day. Max Daily Amount: 100 mg.    3. DDD (degenerative disc disease), cervical  -     DULoxetine (CYMBALTA) 60 mg capsule; Take 1 Cap by mouth daily. -     pregabalin (LYRICA) 50 mg capsule; Take 1 Cap by mouth two (2) times a day. Max Daily Amount: 100 mg. IMPRESSION AND PLAN:  Patient returns to the office today with c/o low back and bilateral hip (R>L) pain. Multiple treatment options were discussed. Patient wished to continue her current treatment. I provided her refills of Lyrica 50 mg BID and Cymbalta 60 mg daily. I encouraged her to continue to perform her daily HEP. Patient is neurologically intact. I will see the patient back in 6 month's time or earlier if needed. Written by Marcial Palacios, as dictated by Shanon Busitllos MD  I examined the patient, reviewed and agree with the note.

## 2020-10-26 NOTE — PROGRESS NOTES
[facility]  [address], [place]   Phone: [phone]  Fax: [phone]        PROGRESS NOTE      HISTORY OF PRESENT ILLNESS:  The patient is a 61 y.o. female and was seen today for follow up of low back and bilateral hip (R>L) pain. Previously, she was seen for low back pain. Previously, she was seen with c/o paraesthesias in her shoulder. Previously, she was seen for pain localized to the low back. She denied radicular symptoms at the time. She was previously seen with low back pain radiating into the BLE (L>R) in an S1 distribution to the foot. She reports her pain is a near-constant 9/10. She describes symptoms consistent with spinal stenosis. Her pain was previously low back pain radiating into the BLE in an S1 distribution to the mid thigh. She was previously seen with low back pain into the BLE extending in an S1 distribution to the knees. Pain is exacerbated by standing and ambulating. Per patient, she has a torn meniscus of the right knee which could possibly be contributing to her low back pain. Pt reports her baseline level of pain at 4/10. The patient additionally had complaints of neck pain. Pt underwent L3/4 epidural from 1/31/17 reporting some relief but notes prior block offered better relief. Pt underwent L3/4 epidural on 7/25/17 and reported she was symptom-free. Pt underwent an L3/4 epidural on 9/4/18 with no relief at this time.  She previously received a knee injection from Dr. Forest Parish 7/2019 with relief. Pt failed NEURONTIN and TOPAMAX. Pt is compliant with Cymbalta 30 mg per day. She reports rare use of Tramadol. Pt was prescribed Klonopin for insomnia but states she has discontinued use. She completed MDP with minimal transient relief. Pt is completing her HEP daily. She has hx of DM and reports monitoring her blood sugars 3x/day. She is followed for her DM by Dr. Bernardo Strange. She reports her blood sugars are well controlled.  She denies hx of glaucoma. At that time, patient was doing well. Note from [facility], NP dated 6/27/18 indicating patient was seen with c/o low back and BLE pain, most severe in the bilateral buttocks. Patient decreased her Cymbalta to 30 mg daily, and did well for a while. She went on vacation and noted an increase in pain following this, so she increased her Cymbalta back to 60 mg daily. She was scheduled to f/u in 6 month's time. Her pain at that time was 4-8/10 with no specific injury or trauma. She was treated with MDP at this time and continued on Cymbalta. Upper extremity EMG per report revealed evidence of moderately severe carpal tunnel syndrome bilaterally, worse on the right. Cervical spine MRI per report revealed degenerative disc disease at multiple levels with foraminal encroachment and uncovertebral joint spurring. Lumbar spine MRI dated 9/24/18 reviewed. Per report, broad-based disc protrusion L3-4 has increased in size in the interval. Associated moderate spinal canal encroachment. Borad-based disc protrusion at L4-5 was present previously. However there is a new extruded disc fragment posterior to the L4 level in the left central region. There is severe spinal canal stenosis with effacement of the thecal sac. Multilevel exit foraminal encroachment as described above. Similar to the prior study at the L4-5 disc space level. Broad-based disc protrusion with posterior osteophytic ridging at L5-S1 similar to the prior study. At her last clinical appointment, patient wished to continue her current treatment. I provided her refills of Lyrica 50 mg BID and Cymbalta 60 mg daily. I encouraged her to continue to perform her daily HEP. The patient returns today with low back and bilateral hip pain radiating into the BLE (RLE>LLE) to the feet. She rates her pain 7/10, previously 4/10. This is an earlier than planned f/u. Pt had an acute increase in pain x 3 weeks ago without trauma.  Her pain is exacerbated by walking. Her pain is relieved with sitting. She is compliant with the Lyrica 50 mg BID and Cymbalta 60 mg daily. She is compliant with her HEP. Pt takes baby Aspirin. Pt denies change in bowel or bladder habits. The patient has a history of DM and reports blood sugars are well controlled, consistently remaining below 200. Her DM is followed by Dr. Apolinar Martinez ([phone]).  reviewed. Body mass index is 35.71 kg/m². PCP: Shelagh Romberg, MD      Past Medical History:   Diagnosis Date    Arthritis     Cervical radiculopathy     Diabetes ([facility] [address]     Hypercholesteremia     Hypertension         Social History     Socioeconomic History    Marital status:      Spouse name: Not on file    Number of children: Not on file    Years of education: Not on file    Highest education level: Not on file   Occupational History    Not on file   Social Needs    Financial resource strain: Not on file    Food insecurity     Worry: Not on file     Inability: Not on file    Transportation needs     Medical: Not on file     Non-medical: Not on file   Tobacco Use    Smoking status: Never Smoker    Smokeless tobacco: Never Used   Substance and Sexual Activity    Alcohol use:  Yes     Alcohol/week: 0.8 standard drinks     Types: 1 Glasses of wine per week    Drug use: No    Sexual activity: Not on file   Lifestyle    Physical activity     Days per week: Not on file     Minutes per session: Not on file    Stress: Not on file   Relationships    Social connections     Talks on phone: Not on file     Gets together: Not on file     Attends Gnosticism service: Not on file     Active member of club or organization: Not on file     Attends meetings of clubs or organizations: Not on file     Relationship status: Not on file    Intimate partner violence     Fear of current or ex partner: Not on file     Emotionally abused: Not on file     Physically abused: Not on file     Forced sexual activity: Not on file   Other Topics Concern    Not on file   Social History Narrative    Not on file       Current Outpatient Medications   Medication Sig Dispense Refill    insulin detemir U-100 (LEVEMIR FLEXTOUCH) 100 unit/mL (3 mL) inpn INJECT 30  UNITS BENEATH THE SKIN EVERY EVENING      DULoxetine (CYMBALTA) 60 mg capsule Take 1 Cap by mouth daily. 90 Cap 1    pregabalin (LYRICA) 50 mg capsule Take 1 Cap by mouth two (2) times a day. Max Daily Amount: 100 mg. 180 Cap 1    glucose blood VI test strips (ASCENSIA AUTODISC VI, ONE TOUCH ULTRA TEST VI) strip 50 Each.  Blood-Glucose Meter misc 1 Each.  ibuprofen (MOTRIN) 600 mg tablet Take 600 mg by mouth.  HUMALOG KWIKPEN INSULIN 100 unit/mL kwikpen INJECT 6 TO 8 UNITS SUBCUTANEOUSLY THREE TIMES DAILY BEFORE MEALS      insulin lispro (HUMALOG) 100 unit/mL kwikpen Inject  6-8 units sub-q TID AC      ketorolac (ACULAR) 0.5 % ophthalmic solution INSTILL 1 DROP INTO RIGHT EYE THREE TIMES DAILY      ONETOUCH DELICA PLUS LANCET 33 gauge misc USE 1 TO CHECK GLUCOSE THREE TIMES DAILY      losartan-hydroCHLOROthiazide (HYZAAR) 100-25 mg per tablet Take 1 Tab by mouth.  magnesium oxide (MAG-OX) 400 mg tablet Take 400 mg by mouth.  METHYL SALICYLATE-MENTHOL EX Apply  to affected area.  Insulin Needles, Disposable, (EVAN PEN NEEDLE) 32 gauge x 5/32\" ndle USE TO INJECT INSULIN FOUR TIMES A DAY      DULoxetine (CYMBALTA) 60 mg capsule Take 1 Cap by mouth daily. 90 Cap 1    pregabalin (LYRICA) 50 mg capsule Take 1 Cap by mouth two (2) times a day. Max Daily Amount: 100 mg. 180 Cap 1    DULoxetine (CYMBALTA) 60 mg capsule Take 1 Cap by mouth daily. 90 Cap 1    pregabalin (LYRICA) 50 mg capsule Take 1 Cap by mouth two (2) times a day. Max Daily Amount: 100 mg. 180 Cap 1    DULoxetine (CYMBALTA) 60 mg capsule Take 1 Cap by mouth daily. 90 Cap 1    pregabalin (LYRICA) 50 mg capsule Take 1 Cap by mouth two (2) times a day.  Max Daily Amount: 100 mg. 180 Cap 1    DULoxetine (CYMBALTA) 60 mg capsule Take 1 Cap by mouth daily. 90 Cap 0    aspirin delayed-release 81 mg tablet Take  by mouth daily.  pregabalin (LYRICA) 50 mg capsule Take 1 Cap by mouth two (2) times a day. Max Daily Amount: 100 mg. 42 Cap 0    multivitamin (ONE A DAY) tablet Take 1 Tab by mouth daily.  cholecalciferol (VITAMIN D3) 1,000 unit cap Take  by mouth daily.  CRESTOR 20 mg tablet       metFORMIN (GLUCOPHAGE) 1,000 mg tablet Take 1,000 mg by mouth two (2) times daily (with meals).  losartan 50 mg tab 100 mg, hydrochlorothiazide 25 mg tab 25 mg Take  by mouth daily.  ACETAMINOPHEN (TYLENOL EXTRA STRENGTH PO) Take  by mouth.  lancets (ONE TOUCH DELICA) 33 gauge misc USE TO CHECK BLOOD SUGAR THREE TIMES A DAY      DULoxetine (CYMBALTA) 30 mg capsule Take 1 Cap by mouth daily. Indications: NEUROPATHIC PAIN (Patient not taking: Reported on 6/27/2018) 90 Cap 1    insulin aspart (NOVOLOG FLEXPEN) 100 unit/mL inpn by SubCUTAneous route.  oxyCODONE-acetaminophen (PERCOCET) 5-325 mg per tablet take 1 to 2 tablets by mouth every 4 to 6 hours if needed for pain  0    HYDROcodone-acetaminophen (NORCO) 5-325 mg per tablet Take 1 Tab by mouth every eight (8) hours as needed for Pain. Max Daily Amount: 3 Tabs. Indications: PAIN (Patient not taking: Reported on 10/26/2018) 30 Tab 0    traMADol (ULTRAM) 50 mg tablet Take 1 Tab by mouth daily. Max Daily Amount: 50 mg. (Patient not taking: Reported on 10/26/2018) 30 Tab 0    clonazePAM (KLONOPIN) 0.5 mg tablet       LANTUS SOLOSTAR 100 unit/mL (3 mL) pen       cyclobenzaprine (FLEXERIL) 10 mg tablet Take  by mouth three (3) times daily as needed for Muscle Spasm(s).          Allergies   Allergen Reactions    Actos [Pioglitazone] Unable to Obtain    Daypro [Oxaprozin] Unknown (comments)    Lisinopril Unable to Obtain    Other Medication Unable to Obtain     Other allergies are:  Antiinflammatories  Algesics  And several diabetic medications    Topamax [Topiramate] Other (comments)     Unknown reaction    Zocor [Simvastatin] Myalgia          PHYSICAL EXAMINATION    Visit Vitals  BP (!) 146/70 (BP 1 Location: Left arm, BP Patient Position: Sitting)   Pulse 93   Temp 98.4 °F (36.9 °C) (Temporal)   Wt 228 lb (103.4 kg)   SpO2 99%   BMI 35.71 kg/m²       CONSTITUTIONAL: NAD, A&O x 3  SENSATION: Intact to light touch throughout  RANGE OF MOTION: The patient has full passive range of motion in all four extremities. MOTOR:  Straight Leg Raise: Negative, bilateral                 Hip Flex Knee Ext Knee Flex Ankle DF GTE Ankle PF Tone   Right +4/5 +4/5 +4/5 +4/5 +4/5 +4/5 +4/5   Left +4/5 +4/5 +4/5 +4/5 +4/5 +4/5 +4/5       ASSESSMENT   Diagnoses and all orders for this visit:    1. Spinal stenosis of lumbar region without neurogenic claudication  -     SCHEDULE SURGERY    2. DDD (degenerative disc disease), lumbar  -     SCHEDULE SURGERY    3. DDD (degenerative disc disease), cervical  -     SCHEDULE SURGERY    Other orders  -     pregabalin (Lyrica) 75 mg capsule; Take 1 Cap by mouth two (2) times a day. Max Daily Amount: 150 mg.        IMPRESSION AND PLAN:  Patient returns to the office today with c/o low back and bilateral hip pain radiating into the BLE (RLE>LLE) to the feet. Multiple treatment options were discussed. I will increase her Lyrica from 50 mg BID to 75 mg BID. Patient advised to call the office if intolerant to higher dose. She did not require refills of Cymbalta at this time. I encouraged her to continue to perform her daily HEP. Pt elected to proceed with a block. Pending approval from Dr. Bishop German, I will order an epidural L3-4. Patient is neurologically intact. I will see the patient back following the block or earlier if needed. Written by Tika Alonso, as dictated by Florentin Gallegos MD  I examined the patient, reviewed and agree with the note.

## 2020-10-28 ENCOUNTER — OFFICE VISIT (OUTPATIENT)
Dept: ORTHOPEDIC SURGERY | Age: 60
End: 2020-10-28
Payer: COMMERCIAL

## 2020-10-28 VITALS
HEART RATE: 93 BPM | DIASTOLIC BLOOD PRESSURE: 70 MMHG | OXYGEN SATURATION: 99 % | SYSTOLIC BLOOD PRESSURE: 146 MMHG | WEIGHT: 228 LBS | TEMPERATURE: 98.4 F | BODY MASS INDEX: 35.71 KG/M2

## 2020-10-28 DIAGNOSIS — M51.36 DDD (DEGENERATIVE DISC DISEASE), LUMBAR: ICD-10-CM

## 2020-10-28 DIAGNOSIS — M48.061 SPINAL STENOSIS OF LUMBAR REGION WITHOUT NEUROGENIC CLAUDICATION: Primary | ICD-10-CM

## 2020-10-28 DIAGNOSIS — M50.30 DDD (DEGENERATIVE DISC DISEASE), CERVICAL: ICD-10-CM

## 2020-10-28 PROCEDURE — 99214 OFFICE O/P EST MOD 30 MIN: CPT | Performed by: PHYSICAL MEDICINE & REHABILITATION

## 2020-10-28 RX ORDER — PREGABALIN 75 MG/1
75 CAPSULE ORAL 2 TIMES DAILY
Qty: 60 CAP | Refills: 1 | Status: SHIPPED | OUTPATIENT
Start: 2020-10-28 | End: 2021-02-26 | Stop reason: SDUPTHER

## 2020-10-28 NOTE — LETTER
10/28/20 Patient: Radha Jung YOB: 1960 Date of Visit: 10/28/2020 MD Sagar Devlin  
[address] [address] VIA Facsimile: [phone] Dear Alyssa Patton MD, Thank you for referring Ms. Radha Jung to [address] for evaluation. My notes for this consultation are attached. If you have questions, please do not hesitate to call me. I look forward to following your patient along with you. Sincerely, Ilya Nath MD

## 2020-11-06 ENCOUNTER — DOCUMENTATION ONLY (OUTPATIENT)
Dept: ORTHOPEDIC SURGERY | Age: 60
End: 2020-11-06

## 2020-11-06 ENCOUNTER — TELEPHONE (OUTPATIENT)
Dept: ORTHOPEDIC SURGERY | Age: 60
End: 2020-11-06

## 2020-11-06 DIAGNOSIS — F41.8 TEST ANXIETY: Primary | ICD-10-CM

## 2020-11-08 RX ORDER — DIAZEPAM 10 MG/1
TABLET ORAL
Qty: 1 TAB | Refills: 0 | Status: SHIPPED | OUTPATIENT
Start: 2020-11-08 | End: 2021-04-30 | Stop reason: ALTCHOICE

## 2020-11-23 NOTE — PROGRESS NOTES
[facility]  [address], [place]   Phone: [phone]  Fax: [phone]        PROGRESS NOTE      HISTORY OF PRESENT ILLNESS:  The patient is a 61 y.o. female and was seen today for follow up of low back and bilateral hip pain radiating into the BLE (RLE>LLE) to the feet. Previously, she was seen for low back and bilateral hip (R>L) pain. Previously, she was seen for low back pain. Previously, she was seen with c/o paraesthesias in her shoulder. Previously, she was seen for pain localized to the low back. She denied radicular symptoms at the time. She was previously seen with low back pain radiating into the BLE (L>R) in an S1 distribution to the foot. She reports her pain is a near-constant 9/10. She describes symptoms consistent with spinal stenosis. Her pain was previously low back pain radiating into the BLE in an S1 distribution to the mid thigh. She was previously seen with low back pain into the BLE extending in an S1 distribution to the knees. Pain is exacerbated by standing and ambulating. Per patient, she has a torn meniscus of the right knee which could possibly be contributing to her low back pain. Pt reports her baseline level of pain at 4/10. The patient additionally had complaints of neck pain. Pt underwent L3/4 epidural from 1/31/17 reporting some relief but notes prior block offered better relief. Pt underwent L3/4 epidural on 7/25/17 and reported she was symptom-free. Pt underwent an L3/4 epidural on 9/4/18 with no relief at this time.  She previously received a knee injection from Dr. ZAVALA 1/0439 with relief. Pt failed NEURONTIN and TOPAMAX. Pt is compliant with Cymbalta 30 mg per day. She reports rare use of Tramadol. Pt takes baby Aspirin. Pt was prescribed Klonopin for insomnia but states she has discontinued use. She completed MDP with minimal transient relief. Pt is completing her HEP daily.  She has hx of DM and reports monitoring her blood sugars 3x/day. Her DM is followed by Dr. Corky Mcguire ([phone]). She reports her blood sugars are well controlled. She denies hx of glaucoma. At that time, patient was doing well. Note from [facility], NP dated 6/27/18 indicating patient was seen with c/o low back and BLE pain, most severe in the bilateral buttocks. Patient decreased her Cymbalta to 30 mg daily, and did well for a while. She went on vacation and noted an increase in pain following this, so she increased her Cymbalta back to 60 mg daily. She was scheduled to f/u in 6 month's time. Her pain at that time was 4-8/10 with no specific injury or trauma. She was treated with MDP at this time and continued on Cymbalta. Upper extremity EMG per report revealed evidence of moderately severe carpal tunnel syndrome bilaterally, worse on the right. Cervical spine MRI per report revealed degenerative disc disease at multiple levels with foraminal encroachment and uncovertebral joint spurring. Lumbar spine MRI dated 9/24/18 reviewed. Per report, broad-based disc protrusion L3-4 has increased in size in the interval. Associated moderate spinal canal encroachment. Borad-based disc protrusion at L4-5 was present previously. However there is a new extruded disc fragment posterior to the L4 level in the left central region. There is severe spinal canal stenosis with effacement of the thecal sac. Multilevel exit foraminal encroachment as described above. Similar to the prior study at the L4-5 disc space level. Broad-based disc protrusion with posterior osteophytic ridging at L5-S1 similar to the prior study. At her last clinical appointment, I increased her Lyrica from 50 mg BID to 75 mg BID. She did not require refills of Cymbalta at the time. I encouraged her to continue to perform her daily HEP. Pt elected to proceed with a block.  With approval from Dr. Leo Fields, I ordered an epidural L3-4.        The patient returns today and reports pain location and distribution remains unchanged. She rates her pain 0-8/10, previously 7/10. Her pain is exacerbated by standing and walking. Additionally, she endorses left foot pain. Pt underwent epidural L3-4 on 11/10/2020 with relief. She is tolerating the increased dose of Lyrica 75 mg BID. She reports initial \"foggy headedness\" but notes improvement. She is compliant with her HEP. Pt denies change in bowel or bladder habits.  reviewed. Body mass index is 36.02 kg/m². PCP: Sai Madera MD      Past Medical History:   Diagnosis Date    Arthritis     Cervical radiculopathy     Diabetes ([facility] [address]     Hypercholesteremia     Hypertension         Social History     Socioeconomic History    Marital status:      Spouse name: Not on file    Number of children: Not on file    Years of education: Not on file    Highest education level: Not on file   Occupational History    Not on file   Social Needs    Financial resource strain: Not on file    Food insecurity     Worry: Not on file     Inability: Not on file    Transportation needs     Medical: Not on file     Non-medical: Not on file   Tobacco Use    Smoking status: Never Smoker    Smokeless tobacco: Never Used   Substance and Sexual Activity    Alcohol use:  Yes     Alcohol/week: 0.8 standard drinks     Types: 1 Glasses of wine per week    Drug use: No    Sexual activity: Not on file   Lifestyle    Physical activity     Days per week: Not on file     Minutes per session: Not on file    Stress: Not on file   Relationships    Social connections     Talks on phone: Not on file     Gets together: Not on file     Attends Hoahaoism service: Not on file     Active member of club or organization: Not on file     Attends meetings of clubs or organizations: Not on file     Relationship status: Not on file    Intimate partner violence     Fear of current or ex partner: Not on file     Emotionally abused: Not on file     Physically abused: Not on file     Forced sexual activity: Not on file   Other Topics Concern    Not on file   Social History Narrative    Not on file       Current Outpatient Medications   Medication Sig Dispense Refill    insulin detemir U-100 (LEVEMIR FLEXTOUCH) 100 unit/mL (3 mL) inpn INJECT 30  UNITS BENEATH THE SKIN EVERY EVENING      pregabalin (Lyrica) 75 mg capsule Take 1 Cap by mouth two (2) times a day. Max Daily Amount: 150 mg. 60 Cap 1    DULoxetine (CYMBALTA) 60 mg capsule Take 1 Cap by mouth daily. 90 Cap 1    Blood-Glucose Meter misc 1 Each.  ibuprofen (MOTRIN) 600 mg tablet Take 600 mg by mouth.  HUMALOG KWIKPEN INSULIN 100 unit/mL kwikpen INJECT 6 TO 8 UNITS SUBCUTANEOUSLY THREE TIMES DAILY BEFORE MEALS      ketorolac (ACULAR) 0.5 % ophthalmic solution INSTILL 1 DROP INTO RIGHT EYE THREE TIMES DAILY      losartan-hydroCHLOROthiazide (HYZAAR) 100-25 mg per tablet Take 1 Tab by mouth.  magnesium oxide (MAG-OX) 400 mg tablet Take 400 mg by mouth.  aspirin delayed-release 81 mg tablet Take  by mouth daily.  multivitamin (ONE A DAY) tablet Take 1 Tab by mouth daily.  cholecalciferol (VITAMIN D3) 1,000 unit cap Take  by mouth daily.  CRESTOR 20 mg tablet       metFORMIN (GLUCOPHAGE) 1,000 mg tablet Take 1,000 mg by mouth two (2) times daily (with meals).  ACETAMINOPHEN (TYLENOL EXTRA STRENGTH PO) Take  by mouth.  diazePAM (VALIUM) 10 mg tablet Take 1 tab by mouth as directed by nurse prior to procedure 1 Tab 0    pregabalin (LYRICA) 50 mg capsule Take 1 Cap by mouth two (2) times a day. Max Daily Amount: 100 mg. 180 Cap 1    glucose blood VI test strips (ASCENSIA AUTODISC VI, ONE TOUCH ULTRA TEST VI) strip 50 Each.       insulin lispro (HUMALOG) 100 unit/mL kwikpen Inject  6-8 units sub-q TID AC      lancets (ONE TOUCH DELICA) 33 gauge misc USE TO CHECK BLOOD SUGAR THREE TIMES A DAY      ONETOUCH DELICA PLUS LANCET 33 gauge misc USE 1 TO CHECK GLUCOSE THREE TIMES DAILY      METHYL SALICYLATE-MENTHOL EX Apply  to affected area.  Insulin Needles, Disposable, (EVAN PEN NEEDLE) 32 gauge x 5/32\" ndle USE TO INJECT INSULIN FOUR TIMES A DAY      DULoxetine (CYMBALTA) 60 mg capsule Take 1 Cap by mouth daily. 90 Cap 1    pregabalin (LYRICA) 50 mg capsule Take 1 Cap by mouth two (2) times a day. Max Daily Amount: 100 mg. 180 Cap 1    DULoxetine (CYMBALTA) 60 mg capsule Take 1 Cap by mouth daily. 90 Cap 1    pregabalin (LYRICA) 50 mg capsule Take 1 Cap by mouth two (2) times a day. Max Daily Amount: 100 mg. 180 Cap 1    DULoxetine (CYMBALTA) 60 mg capsule Take 1 Cap by mouth daily. 90 Cap 1    pregabalin (LYRICA) 50 mg capsule Take 1 Cap by mouth two (2) times a day. Max Daily Amount: 100 mg. 180 Cap 1    DULoxetine (CYMBALTA) 60 mg capsule Take 1 Cap by mouth daily. 90 Cap 0    pregabalin (LYRICA) 50 mg capsule Take 1 Cap by mouth two (2) times a day. Max Daily Amount: 100 mg. 42 Cap 0    DULoxetine (CYMBALTA) 30 mg capsule Take 1 Cap by mouth daily. Indications: NEUROPATHIC PAIN (Patient not taking: Reported on 6/27/2018) 90 Cap 1    insulin aspart (NOVOLOG FLEXPEN) 100 unit/mL inpn by SubCUTAneous route.  oxyCODONE-acetaminophen (PERCOCET) 5-325 mg per tablet take 1 to 2 tablets by mouth every 4 to 6 hours if needed for pain  0    HYDROcodone-acetaminophen (NORCO) 5-325 mg per tablet Take 1 Tab by mouth every eight (8) hours as needed for Pain. Max Daily Amount: 3 Tabs. Indications: PAIN (Patient not taking: Reported on 10/26/2018) 30 Tab 0    traMADol (ULTRAM) 50 mg tablet Take 1 Tab by mouth daily. Max Daily Amount: 50 mg. (Patient not taking: Reported on 10/26/2018) 30 Tab 0    clonazePAM (KLONOPIN) 0.5 mg tablet       LANTUS SOLOSTAR 100 unit/mL (3 mL) pen       losartan 50 mg tab 100 mg, hydrochlorothiazide 25 mg tab 25 mg Take  by mouth daily.  cyclobenzaprine (FLEXERIL) 10 mg tablet Take  by mouth three (3) times daily as needed for Muscle Spasm(s). Allergies   Allergen Reactions    Actos [Pioglitazone] Unable to Obtain    Daypro [Oxaprozin] Unknown (comments)    Lisinopril Unable to Obtain    Other Medication Unable to Obtain     Other allergies are:  Antiinflammatories  Algesics  And several diabetic medications    Topamax [Topiramate] Other (comments)     Unknown reaction    Zocor [Simvastatin] Myalgia          PHYSICAL EXAMINATION    Visit Vitals  BP (!) 149/71 (BP 1 Location: Left arm)   Pulse 90   Temp 97.1 °F (36.2 °C)   Resp 18   Ht 5' 7\" (1.702 m)   Wt 230 lb (104.3 kg)   SpO2 100%   BMI 36.02 kg/m²       CONSTITUTIONAL: NAD, A&O x 3  SENSATION: Intact to light touch throughout  RANGE OF MOTION: The patient has full passive range of motion in all four extremities. MOTOR:  Straight Leg Raise: Negative, bilateral                 Hip Flex Knee Ext Knee Flex Ankle DF GTE Ankle PF Tone   Right +4/5 +4/5 +4/5 +4/5 +4/5 +4/5 +4/5   Left +4/5 +4/5 +4/5 +4/5 +4/5 +4/5 +4/5       ASSESSMENT   Diagnoses and all orders for this visit:    1. Spinal stenosis of lumbar region without neurogenic claudication  -     pregabalin (Lyrica) 150 mg capsule; Take 1 Cap by mouth two (2) times a day. Max Daily Amount: 300 mg.    2. DDD (degenerative disc disease), lumbar  -     pregabalin (Lyrica) 150 mg capsule; Take 1 Cap by mouth two (2) times a day. Max Daily Amount: 300 mg.    3. DDD (degenerative disc disease), cervical  -     pregabalin (Lyrica) 150 mg capsule; Take 1 Cap by mouth two (2) times a day. Max Daily Amount: 300 mg. IMPRESSION AND PLAN:  Patient returns to the office today with c/o low back and bilateral hip pain radiating into the BLE (RLE>LLE) to the feet. Multiple treatment options were discussed. Pt is not interested in surgical intervention or additional blocks at this time. I will increase her Lyrica from 75 mg BID to 150 mg BID. Patient advised to call the office if intolerant to higher dose. She did not require refills of Cymbalta.  I encouraged her to continue to perform her daily HEP. I offered a referral to orthopedics for her left foot pain, pt declined. Patient is neurologically intact. I will see the patient back in 1 month's time or earlier if needed. Written by Elidia Arteaga, as dictated by Jim Gil MD  I examined the patient, reviewed and agree with the note.

## 2020-11-24 ENCOUNTER — OFFICE VISIT (OUTPATIENT)
Dept: ORTHOPEDIC SURGERY | Age: 60
End: 2020-11-24
Payer: COMMERCIAL

## 2020-11-24 VITALS
OXYGEN SATURATION: 100 % | WEIGHT: 230 LBS | RESPIRATION RATE: 18 BRPM | BODY MASS INDEX: 36.1 KG/M2 | SYSTOLIC BLOOD PRESSURE: 149 MMHG | TEMPERATURE: 97.1 F | DIASTOLIC BLOOD PRESSURE: 71 MMHG | HEIGHT: 67 IN | HEART RATE: 90 BPM

## 2020-11-24 DIAGNOSIS — M51.36 DDD (DEGENERATIVE DISC DISEASE), LUMBAR: ICD-10-CM

## 2020-11-24 DIAGNOSIS — M48.061 SPINAL STENOSIS OF LUMBAR REGION WITHOUT NEUROGENIC CLAUDICATION: Primary | ICD-10-CM

## 2020-11-24 DIAGNOSIS — M50.30 DDD (DEGENERATIVE DISC DISEASE), CERVICAL: ICD-10-CM

## 2020-11-24 PROCEDURE — 99214 OFFICE O/P EST MOD 30 MIN: CPT | Performed by: PHYSICAL MEDICINE & REHABILITATION

## 2020-11-24 RX ORDER — PREGABALIN 150 MG/1
150 CAPSULE ORAL 2 TIMES DAILY
Qty: 60 CAP | Refills: 1 | Status: SHIPPED | OUTPATIENT
Start: 2020-11-24 | End: 2021-02-26 | Stop reason: SDUPTHER

## 2020-11-24 NOTE — LETTER
11/24/20 Patient: Umm Mcbride YOB: 1960 Date of Visit: 11/24/2020 MD Stevie Garcia  
[address] [place] [address] VIA Facsimile: [phone] Dear Heriberto Live MD, Thank you for referring Ms. Umm Mcbride to [address] for evaluation. My notes for this consultation are attached. If you have questions, please do not hesitate to call me. I look forward to following your patient along with you. Sincerely, Madelaine Larson MD

## 2020-12-14 NOTE — PROGRESS NOTES
[facility]  [address], [address]  Phone: [phone]  Fax: [phone]        PROGRESS NOTE      HISTORY OF PRESENT ILLNESS:  The patient is a 61 y.o. female and was seen today for follow up of low back and bilateral hip pain radiating into the BLE (RLE>LLE) in a S1 distribution to the foot on the RLE. Previously, she was seen for low back and bilateral hip (R>L) pain. Previously, she was seen for low back pain. Previously, she was seen with c/o paraesthesias in her shoulder. Previously, she was seen for pain localized to the low back. She denied radicular symptoms at the time. She was previously seen with low back pain radiating into the BLE (L>R) in an S1 distribution to the foot. She reports her pain is a near-constant 9/10. She describes symptoms consistent with spinal stenosis. Her pain was previously low back pain radiating into the BLE in an S1 distribution to the mid thigh. She was previously seen with low back pain into the BLE extending in an S1 distribution to the knees. Pain is exacerbated by standing and ambulating. Per patient, she has a torn meniscus of the right knee which could possibly be contributing to her low back pain. Pt reports her baseline level of pain at 4/10. The patient additionally had complaints of neck pain. Pt underwent L3/4 epidural from 1/31/17 reporting some relief but notes prior block offered better relief. Pt underwent L3/4 epidural on 7/25/17 and reported she was symptom-free. Pt underwent an L3/4 epidural on 9/4/18 with no relief at this time. Pt underwent epidural L3-4 on 11/10/2020 with relief. She previously received a knee injection from Dr. BOYER 1/3215 with relief. Pt failed NEURONTIN and TOPAMAX. Pt is compliant with Cymbalta 30 mg per day. She reports rare use of Tramadol. Pt takes baby Aspirin. Pt was prescribed Klonopin for insomnia but states she has discontinued use.  She completed MDP with minimal transient relief. Pt is completing her HEP daily. She has hx of DM and reports monitoring her blood sugars 3x/day. Her DM is followed by Dr. Reji Bailey [phone]).  She reports her blood sugars are well controlled. She denies hx of glaucoma. At that time, patient was doing well. Note from [facility], NP dated 6/27/18 indicating patient was seen with c/o low back and BLE pain, most severe in the bilateral buttocks. Patient decreased her Cymbalta to 30 mg daily, and did well for a while. She went on vacation and noted an increase in pain following this, so she increased her Cymbalta back to 60 mg daily. She was scheduled to f/u in 6 month's time. Her pain at that time was 4-8/10 with no specific injury or trauma. She was treated with MDP at this time and continued on Cymbalta. Upper extremity EMG per report revealed evidence of moderately severe carpal tunnel syndrome bilaterally, worse on the right. Cervical spine MRI per report revealed degenerative disc disease at multiple levels with foraminal encroachment and uncovertebral joint spurring. Lumbar spine MRI dated 9/24/18 reviewed. Per report, broad-based disc protrusion L3-4 has increased in size in the interval. Associated moderate spinal canal encroachment. Borad-based disc protrusion at L4-5 was present previously. However there is a new extruded disc fragment posterior to the L4 level in the left central region. There is severe spinal canal stenosis with effacement of the thecal sac. Multilevel exit foraminal encroachment as described above. Similar to the prior study at the L4-5 disc space level. Broad-based disc protrusion with posterior osteophytic ridging at L5-S1 similar to the prior study. At her last clinical appointment, pt was not interested in surgical intervention or additional blocks at the time. I increased her Lyrica from 75 mg BID to 150 mg BID. She did not require refills of Cymbalta. I encouraged her to continue to perform her daily HEP.  I offered a referral to orthopedics for her left foot pain, pt declined. The patient returns today and reports pain location and distribution remains unchanged. She rates her pain 5-6/10, previously 0-8/10. Her pain is exacerbated by standing and walking. She is tolerating the increased dose of Lyrica 150 mg BID with some benefit. She had initial dizziness but notes improvement. She continues on Cymbalta 60 mg daily. She is compliant with her HEP. Pt denies change in bowel or bladder habits.  reviewed. Body mass index is 36.37 kg/m². PCP: Pastora Dominique MD      Past Medical History:   Diagnosis Date    Arthritis     Cervical radiculopathy     Diabetes ([facility] [address]     Hypercholesteremia     Hypertension         Social History     Socioeconomic History    Marital status:      Spouse name: Not on file    Number of children: Not on file    Years of education: Not on file    Highest education level: Not on file   Occupational History    Not on file   Social Needs    Financial resource strain: Not on file    Food insecurity     Worry: Not on file     Inability: Not on file    Transportation needs     Medical: Not on file     Non-medical: Not on file   Tobacco Use    Smoking status: Never Smoker    Smokeless tobacco: Never Used   Substance and Sexual Activity    Alcohol use:  Yes     Alcohol/week: 0.8 standard drinks     Types: 1 Glasses of wine per week    Drug use: No    Sexual activity: Not on file   Lifestyle    Physical activity     Days per week: Not on file     Minutes per session: Not on file    Stress: Not on file   Relationships    Social connections     Talks on phone: Not on file     Gets together: Not on file     Attends Sabianist service: Not on file     Active member of club or organization: Not on file     Attends meetings of clubs or organizations: Not on file     Relationship status: Not on file    Intimate partner violence     Fear of current or ex partner: Not on file     Emotionally abused: Not on file     Physically abused: Not on file     Forced sexual activity: Not on file   Other Topics Concern    Not on file   Social History Narrative    Not on file       Current Outpatient Medications   Medication Sig Dispense Refill    pregabalin (Lyrica) 150 mg capsule Take 1 Cap by mouth two (2) times a day. Max Daily Amount: 300 mg. 60 Cap 1    insulin detemir U-100 (LEVEMIR FLEXTOUCH) 100 unit/mL (3 mL) inpn INJECT 30  UNITS BENEATH THE SKIN EVERY EVENING      DULoxetine (CYMBALTA) 60 mg capsule Take 1 Cap by mouth daily. 90 Cap 1    glucose blood VI test strips (ASCENSIA AUTODISC VI, ONE TOUCH ULTRA TEST VI) strip 50 Each.  Blood-Glucose Meter misc 1 Each.  ibuprofen (MOTRIN) 600 mg tablet Take 600 mg by mouth.  HUMALOG KWIKPEN INSULIN 100 unit/mL kwikpen INJECT 6 TO 8 UNITS SUBCUTANEOUSLY THREE TIMES DAILY BEFORE MEALS      insulin lispro (HUMALOG) 100 unit/mL kwikpen Inject  6-8 units sub-q TID AC      ketorolac (ACULAR) 0.5 % ophthalmic solution INSTILL 1 DROP INTO RIGHT EYE THREE TIMES DAILY      lancets (ONE TOUCH DELICA) 33 gauge misc USE TO CHECK BLOOD SUGAR THREE TIMES A DAY      ONETOUCH DELICA PLUS LANCET 33 gauge misc USE 1 TO CHECK GLUCOSE THREE TIMES DAILY      losartan-hydroCHLOROthiazide (HYZAAR) 100-25 mg per tablet Take 1 Tab by mouth.  magnesium oxide (MAG-OX) 400 mg tablet Take 400 mg by mouth.  METHYL SALICYLATE-MENTHOL EX Apply  to affected area.  Insulin Needles, Disposable, (EVAN PEN NEEDLE) 32 gauge x 5/32\" ndle USE TO INJECT INSULIN FOUR TIMES A DAY      DULoxetine (CYMBALTA) 60 mg capsule Take 1 Cap by mouth daily. 90 Cap 1    DULoxetine (CYMBALTA) 60 mg capsule Take 1 Cap by mouth daily. 90 Cap 1    DULoxetine (CYMBALTA) 60 mg capsule Take 1 Cap by mouth daily. 90 Cap 1    DULoxetine (CYMBALTA) 60 mg capsule Take 1 Cap by mouth daily. 90 Cap 0    aspirin delayed-release 81 mg tablet Take  by mouth daily.  multivitamin (ONE A DAY) tablet Take 1 Tab by mouth daily.  cholecalciferol (VITAMIN D3) 1,000 unit cap Take  by mouth daily.  CRESTOR 20 mg tablet       metFORMIN (GLUCOPHAGE) 1,000 mg tablet Take 1,000 mg by mouth two (2) times daily (with meals).  ACETAMINOPHEN (TYLENOL EXTRA STRENGTH PO) Take  by mouth.  diazePAM (VALIUM) 10 mg tablet Take 1 tab by mouth as directed by nurse prior to procedure 1 Tab 0    pregabalin (Lyrica) 75 mg capsule Take 1 Cap by mouth two (2) times a day. Max Daily Amount: 150 mg. (Patient not taking: Reported on 12/16/2020) 60 Cap 1    pregabalin (LYRICA) 50 mg capsule Take 1 Cap by mouth two (2) times a day. Max Daily Amount: 100 mg. (Patient not taking: Reported on 12/16/2020) 180 Cap 1    pregabalin (LYRICA) 50 mg capsule Take 1 Cap by mouth two (2) times a day. Max Daily Amount: 100 mg. (Patient not taking: Reported on 12/16/2020) 180 Cap 1    pregabalin (LYRICA) 50 mg capsule Take 1 Cap by mouth two (2) times a day. Max Daily Amount: 100 mg. (Patient not taking: Reported on 12/16/2020) 180 Cap 1    pregabalin (LYRICA) 50 mg capsule Take 1 Cap by mouth two (2) times a day. Max Daily Amount: 100 mg. (Patient not taking: Reported on 12/16/2020) 180 Cap 1    pregabalin (LYRICA) 50 mg capsule Take 1 Cap by mouth two (2) times a day. Max Daily Amount: 100 mg. (Patient not taking: Reported on 12/16/2020) 42 Cap 0    DULoxetine (CYMBALTA) 30 mg capsule Take 1 Cap by mouth daily. Indications: NEUROPATHIC PAIN (Patient not taking: Reported on 6/27/2018) 90 Cap 1    insulin aspart (NOVOLOG FLEXPEN) 100 unit/mL inpn by SubCUTAneous route.  oxyCODONE-acetaminophen (PERCOCET) 5-325 mg per tablet take 1 to 2 tablets by mouth every 4 to 6 hours if needed for pain  0    HYDROcodone-acetaminophen (NORCO) 5-325 mg per tablet Take 1 Tab by mouth every eight (8) hours as needed for Pain. Max Daily Amount: 3 Tabs.  Indications: PAIN (Patient not taking: Reported on 10/26/2018) 30 Tab 0    traMADol (ULTRAM) 50 mg tablet Take 1 Tab by mouth daily. Max Daily Amount: 50 mg. (Patient not taking: Reported on 10/26/2018) 30 Tab 0    clonazePAM (KLONOPIN) 0.5 mg tablet       LANTUS SOLOSTAR 100 unit/mL (3 mL) pen       losartan 50 mg tab 100 mg, hydrochlorothiazide 25 mg tab 25 mg Take  by mouth daily.  cyclobenzaprine (FLEXERIL) 10 mg tablet Take  by mouth three (3) times daily as needed for Muscle Spasm(s). Allergies   Allergen Reactions    Actos [Pioglitazone] Unable to Obtain    Daypro [Oxaprozin] Unknown (comments)    Lisinopril Unable to Obtain    Other Medication Unable to Obtain     Other allergies are:  Antiinflammatories  Algesics  And several diabetic medications    Topamax [Topiramate] Other (comments)     Unknown reaction    Zocor [Simvastatin] Myalgia          PHYSICAL EXAMINATION    Visit Vitals  BP (!) 153/73 (BP 1 Location: Left arm, BP Patient Position: Sitting)   Pulse 84   Temp 97.2 °F (36.2 °C) (Temporal)   Wt 232 lb 3.2 oz (105.3 kg)   SpO2 99%   BMI 36.37 kg/m²       CONSTITUTIONAL: NAD, A&O x 3  SENSATION: Intact to light touch throughout  RANGE OF MOTION: The patient has full passive range of motion in all four extremities. MOTOR:  Straight Leg Raise: Negative, bilateral                 Hip Flex Knee Ext Knee Flex Ankle DF GTE Ankle PF Tone   Right +4/5 +4/5 +4/5 +4/5 +4/5 +4/5 +4/5   Left +4/5 +4/5 +4/5 +4/5 +4/5 +4/5 +4/5       ASSESSMENT   Diagnoses and all orders for this visit:    1. Spinal stenosis of lumbar region without neurogenic claudication  -     MRI LUMB SPINE WO CONT; Future  -     EMG TWO EXTREMITIES LOWER; Future  -     pregabalin (LYRICA) 225 mg capsule; Take 1 Cap by mouth two (2) times a day. Max Daily Amount: 450 mg.    2. DDD (degenerative disc disease), lumbar  -     MRI LUMB SPINE WO CONT; Future  -     EMG TWO EXTREMITIES LOWER; Future  -     pregabalin (LYRICA) 225 mg capsule;  Take 1 Cap by mouth two (2) times a day. Max Daily Amount: 450 mg.    3. DDD (degenerative disc disease), cervical  -     MRI LUMB SPINE WO CONT; Future  -     EMG TWO EXTREMITIES LOWER; Future  -     pregabalin (LYRICA) 225 mg capsule; Take 1 Cap by mouth two (2) times a day. Max Daily Amount: 450 mg.        IMPRESSION AND PLAN:  Patient returns to the office today with c/o low back and bilateral hip pain radiating into the BLE (RLE>LLE) in a S1 distribution to the foot on the RLE. Multiple treatment options were discussed. I will increase her Lyrica from 150 mg BID to 225 mg BID. Patient advised to call the office if intolerant to higher dose. She did not require refills of Cymbalta 60 mg daily. I will order a L spine MRI. I advised patient to bring copies of films to next visit. I will order a BLE EMG. I provided her a handicap placard. Patient is neurologically intact. I will see the patient back following the MRI and EMG or earlier if needed. Written by Manju Ventura, as dictated by Zoraida Blanchard MD  I examined the patient, reviewed and agree with the note.

## 2020-12-16 ENCOUNTER — OFFICE VISIT (OUTPATIENT)
Dept: ORTHOPEDIC SURGERY | Age: 60
End: 2020-12-16
Payer: COMMERCIAL

## 2020-12-16 VITALS
TEMPERATURE: 97.2 F | HEART RATE: 84 BPM | OXYGEN SATURATION: 99 % | DIASTOLIC BLOOD PRESSURE: 73 MMHG | WEIGHT: 232.2 LBS | SYSTOLIC BLOOD PRESSURE: 153 MMHG | BODY MASS INDEX: 36.37 KG/M2

## 2020-12-16 DIAGNOSIS — M48.061 SPINAL STENOSIS OF LUMBAR REGION WITHOUT NEUROGENIC CLAUDICATION: Primary | ICD-10-CM

## 2020-12-16 DIAGNOSIS — M51.36 DDD (DEGENERATIVE DISC DISEASE), LUMBAR: ICD-10-CM

## 2020-12-16 DIAGNOSIS — M50.30 DDD (DEGENERATIVE DISC DISEASE), CERVICAL: ICD-10-CM

## 2020-12-16 PROCEDURE — 99214 OFFICE O/P EST MOD 30 MIN: CPT | Performed by: PHYSICAL MEDICINE & REHABILITATION

## 2020-12-16 RX ORDER — PREGABALIN 225 MG/1
225 CAPSULE ORAL 2 TIMES DAILY
Qty: 60 CAP | Refills: 1 | Status: SHIPPED | OUTPATIENT
Start: 2020-12-16 | End: 2021-02-26 | Stop reason: SDUPTHER

## 2020-12-16 NOTE — LETTER
12/16/2020 Patient: Kanchan Garcia YOB: 1960 Date of Visit: 12/16/2020 MD [address] 
[address] [address] [address] Fax: [phone] Dear Honey Cornejo MD, Thank you for referring Ms. Kanchan Garcia to [address] for evaluation. My notes for this consultation are attached. If you have questions, please do not hesitate to call me. I look forward to following your patient along with you. Sincerely, Josep Vargas MD

## 2021-01-16 DIAGNOSIS — M48.061 SPINAL STENOSIS OF LUMBAR REGION WITHOUT NEUROGENIC CLAUDICATION: ICD-10-CM

## 2021-01-16 DIAGNOSIS — M51.36 DDD (DEGENERATIVE DISC DISEASE), LUMBAR: ICD-10-CM

## 2021-01-16 DIAGNOSIS — M50.30 DDD (DEGENERATIVE DISC DISEASE), CERVICAL: ICD-10-CM

## 2021-01-26 ENCOUNTER — TELEPHONE (OUTPATIENT)
Dept: ORTHOPEDIC SURGERY | Age: 61
End: 2021-01-26

## 2021-01-26 DIAGNOSIS — M51.36 DDD (DEGENERATIVE DISC DISEASE), LUMBAR: ICD-10-CM

## 2021-01-26 DIAGNOSIS — M48.061 SPINAL STENOSIS OF LUMBAR REGION WITHOUT NEUROGENIC CLAUDICATION: ICD-10-CM

## 2021-01-26 DIAGNOSIS — M50.30 DDD (DEGENERATIVE DISC DISEASE), CERVICAL: ICD-10-CM

## 2021-01-26 RX ORDER — DULOXETIN HYDROCHLORIDE 60 MG/1
60 CAPSULE, DELAYED RELEASE ORAL DAILY
Qty: 90 CAP | Refills: 1 | Status: SHIPPED | OUTPATIENT
Start: 2021-01-26 | End: 2022-03-01

## 2021-01-26 NOTE — TELEPHONE ENCOUNTER
Patient is requesting a refill for Cymbalta.       Patient [id number]  [address] [address], [address] [place] [address]   Phone:  [phone]  Fax:  [phone]

## 2021-02-26 DIAGNOSIS — M51.36 DDD (DEGENERATIVE DISC DISEASE), LUMBAR: ICD-10-CM

## 2021-02-26 DIAGNOSIS — M50.30 DDD (DEGENERATIVE DISC DISEASE), CERVICAL: ICD-10-CM

## 2021-02-26 DIAGNOSIS — M48.061 SPINAL STENOSIS OF LUMBAR REGION WITHOUT NEUROGENIC CLAUDICATION: ICD-10-CM

## 2021-02-26 RX ORDER — PREGABALIN 225 MG/1
225 CAPSULE ORAL 2 TIMES DAILY
Qty: 60 CAP | Refills: 0 | Status: SHIPPED | OUTPATIENT
Start: 2021-02-26 | End: 2021-04-02 | Stop reason: SDUPTHER

## 2021-02-26 NOTE — TELEPHONE ENCOUNTER
Last Visit: 12/16/20 with MD Cas Martinez  Next Appointment: Advised to follow-up after MRI and EMG  Previous Refill Encounter(s): 12/16/20 #60 with 1 refill    Requested Prescriptions     Pending Prescriptions Disp Refills    pregabalin (LYRICA) 225 mg capsule 60 Cap 1     Sig: Take 1 Cap by mouth two (2) times a day. Max Daily Amount: 450 mg.

## 2021-03-28 DIAGNOSIS — M48.061 SPINAL STENOSIS OF LUMBAR REGION WITHOUT NEUROGENIC CLAUDICATION: ICD-10-CM

## 2021-03-28 DIAGNOSIS — M51.36 DDD (DEGENERATIVE DISC DISEASE), LUMBAR: ICD-10-CM

## 2021-03-28 DIAGNOSIS — M50.30 DDD (DEGENERATIVE DISC DISEASE), CERVICAL: ICD-10-CM

## 2021-03-29 RX ORDER — PREGABALIN 225 MG/1
CAPSULE ORAL
Qty: 60 CAP | Refills: 0 | OUTPATIENT
Start: 2021-03-29

## 2021-04-02 RX ORDER — PREGABALIN 225 MG/1
225 CAPSULE ORAL 2 TIMES DAILY
Qty: 60 CAP | Refills: 0 | Status: SHIPPED | OUTPATIENT
Start: 2021-04-02 | End: 2021-09-21 | Stop reason: ALTCHOICE

## 2021-04-02 NOTE — TELEPHONE ENCOUNTER
Patient called stating her pharmacy requested a refill of the prescription DULoxetine (CYMBALTA) 60 mg capsule on Monday 03/29/21 but she still hasn't received the medication yet. She says she took the last pill yesterday so she needs this refilled today. Please advise patient back regarding this at [phone].

## 2021-04-02 NOTE — TELEPHONE ENCOUNTER
LMOVM to have patient call and make appt. Informed patient that we would not be able to add to the 13th since the provider leaves to do blocks.

## 2021-04-02 NOTE — TELEPHONE ENCOUNTER
Apt would be required with Dr. Brenden Lal. Please have her make FU and I can send in a short supply if needed to get her to FU.

## 2021-04-02 NOTE — TELEPHONE ENCOUNTER
Patient was to schedule our follow up appointment after neurology and MRI scheduled for 4/9. Please call in short-term refill to pharmacy on file. Patient is also asking if we can work her in to the schedule on 4/13 for an early morning appointment at [facility] (we've been able to do it in the past). Please advise patient at [phone].

## 2021-04-29 NOTE — PROGRESS NOTES
[facility]  [address], [place]   Phone: [phone]  Fax: [phone]        PROGRESS NOTE      HISTORY OF PRESENT ILLNESS:  The patient is a 61 y.o. female and was seen today for follow up of up of low back and bilateral hip pain radiating into the BLE (RLE>LLE) in a S1 distribution to the foot on the RLE. Previously, she was seen for low back and bilateral hip (R>L) pain. Previously, she was seen for low back pain. Previously, she was seen with c/o paraesthesias in her shoulder. Previously, she was seen for pain localized to the low back. She denied radicular symptoms at the time. She was previously seen with low back pain radiating into the BLE (L>R) in an S1 distribution to the foot. She reports her pain is a near-constant 9/10. She describes symptoms consistent with spinal stenosis. Her pain was previously low back pain radiating into the BLE in an S1 distribution to the mid thigh. She was previously seen with low back pain into the BLE extending in an S1 distribution to the knees. Pain is exacerbated by standing and ambulating. Per patient, she has a torn meniscus of the right knee which could possibly be contributing to her low back pain. Pt reports her baseline level of pain at 4/10. The patient additionally had complaints of neck pain. Pt underwent L3/4 epidural from 1/31/17 reporting some relief but notes prior block offered better relief. Pt underwent L3/4 epidural on 7/25/17 and reported she was symptom-free. Pt underwent an L3/4 epidural on 9/4/18 with no relief at this time. Pt underwent epidural L3-4 on 11/10/2020 with relief. She previously received a knee injection from Dr. JIMÉNEZ 1/4244 with relief. Pt failed NEURONTIN and TOPAMAX. Pt is compliant with Cymbalta 30 mg per day. She reports rare use of Tramadol. Pt takes baby Aspirin. Pt was prescribed Klonopin for insomnia but states she has discontinued use.  She completed MDP with minimal transient relief. Pt is completing her HEP daily. She has hx of DM and reports monitoring her blood sugars 3x/day. Her DM is followed by Dr. Lauren Hernandez ([phone]).  She reports her blood sugars are well controlled. She denies hx of glaucoma. At that time, patient was doing well. Note from Rodrigo Potter NP dated 6/27/18 indicating patient was seen with c/o low back and BLE pain, most severe in the bilateral buttocks. Patient decreased her Cymbalta to 30 mg daily, and did well for a while. She went on vacation and noted an increase in pain following this, so she increased her Cymbalta back to 60 mg daily. She was scheduled to f/u in 6 month's time. Her pain at that time was 4-8/10 with no specific injury or trauma. She was treated with MDP at this time and continued on Cymbalta. Pt is tolerating the increased dose of Lyrica 150 mg BID with some benefit. She had initial dizziness but notes improvement. She continues on Cymbalta 60 mg daily. Upper extremity EMG per report revealed evidence of moderately severe carpal tunnel syndrome bilaterally, worse on the right. Cervical spine MRI per report revealed degenerative disc disease at multiple levels with foraminal encroachment and uncovertebral joint spurring. Lumbar spine MRI dated 9/24/18 reviewed. Per report, broad-based disc protrusion L3-4 has increased in size in the interval. Associated moderate spinal canal encroachment. Borad-based disc protrusion at L4-5 was present previously. However there is a new extruded disc fragment posterior to the L4 level in the left central region. There is severe spinal canal stenosis with effacement of the thecal sac. Multilevel exit foraminal encroachment as described above. Similar to the prior study at the L4-5 disc space level. Broad-based disc protrusion with posterior osteophytic ridging at L5-S1 similar to the prior study. At her last clinical appointment, I increased her Lyrica from 150 mg BID to 225 mg BID.  She did not require refills of Cymbalta 60 mg daily. I ordered a L spine MRI and a BLE EMG and provided her a handicap placard. The patient returns today reporting her pain location and distribution remain unchanged. She rates her pain 3-7/10, previously 5-6/10. Pt notes increased pain with prolonged activity and standing. She denies any addition relief when she increased her Lyrica to 225 mg BID. Pt continues to take Cymbalta 60 mg daily. She notes her blood sugars are well controlled at this time, which she manages with insulin. Patient denies change in bowel or bladder habits. L spine MRI dated 4/21/2021 was reviewed and demonstrated L3-4: Facet arthropathy anterolisthesis and medium size broad disc bulge combine to cause severe central stenosis with impingement of both lateral recesses. There is moderate right neural foraminal narrowing with impingement of the exiting nerve root. Mild to moderate left neural foraminal narrowing. L4-5: Facet arthropathy. Large broad disc bulge with superimposed central protrusion results in extremely severe central stenosis with impingement of both lateral recesses. There is mild to moderate right and moderate left neural foraminal narrowing. 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  reviewed. Body mass index is 41.54 kg/m².     PCP: Jyoti Doherty MD      Past Medical History:   Diagnosis Date    Arthritis     Cervical radiculopathy     Diabetes ([facility] [address]     Hypercholesteremia     Hypertension         Social History     Socioeconomic History    Marital status:      Spouse name: Not on file    Number of children: Not on file    Years of education: Not on file    Highest education level: Not on file   Occupational History    Not on file   Social Needs    Financial resource strain: Not on file    Food insecurity     Worry: Not on file     Inability: Not on file    Transportation needs     Medical: Not on file     Non-medical: Not on file   Tobacco Use    Smoking status: Never Smoker    Smokeless tobacco: Never Used   Substance and Sexual Activity    Alcohol use: Yes     Alcohol/week: 0.8 standard drinks     Types: 1 Glasses of wine per week    Drug use: No    Sexual activity: Not on file   Lifestyle    Physical activity     Days per week: Not on file     Minutes per session: Not on file    Stress: Not on file   Relationships    Social connections     Talks on phone: Not on file     Gets together: Not on file     Attends Jainism service: Not on file     Active member of club or organization: Not on file     Attends meetings of clubs or organizations: Not on file     Relationship status: Not on file    Intimate partner violence     Fear of current or ex partner: Not on file     Emotionally abused: Not on file     Physically abused: Not on file     Forced sexual activity: Not on file   Other Topics Concern    Not on file   Social History Narrative    Not on file       Current Outpatient Medications   Medication Sig Dispense Refill    pregabalin (LYRICA) 225 mg capsule Take 1 Cap by mouth two (2) times a day. Max Daily Amount: 450 mg. 60 Cap 0    DULoxetine (CYMBALTA) 60 mg capsule Take 1 Cap by mouth daily. 90 Cap 1    insulin detemir U-100 (LEVEMIR FLEXTOUCH) 100 unit/mL (3 mL) inpn INJECT 30  UNITS BENEATH THE SKIN EVERY EVENING      glucose blood VI test strips (ASCENSIA AUTODISC VI, ONE TOUCH ULTRA TEST VI) strip 50 Each.  Blood-Glucose Meter misc 1 Each.  ibuprofen (MOTRIN) 600 mg tablet Take 600 mg by mouth.       HUMALOG KWIKPEN INSULIN 100 unit/mL kwikpen INJECT 6 TO 8 UNITS SUBCUTANEOUSLY THREE TIMES DAILY BEFORE MEALS      ketorolac (ACULAR) 0.5 % ophthalmic solution INSTILL 1 DROP INTO RIGHT EYE THREE TIMES DAILY      lancets (ONE TOUCH DELICA) 33 gauge misc USE TO CHECK BLOOD SUGAR THREE TIMES A DAY  losartan-hydroCHLOROthiazide (HYZAAR) 100-25 mg per tablet Take 1 Tab by mouth.  magnesium oxide (MAG-OX) 400 mg tablet Take 400 mg by mouth.  Insulin Needles, Disposable, (EVAN PEN NEEDLE) 32 gauge x 5/32\" ndle USE TO INJECT INSULIN FOUR TIMES A DAY      aspirin delayed-release 81 mg tablet Take  by mouth daily.  multivitamin (ONE A DAY) tablet Take 1 Tab by mouth daily.  cholecalciferol (VITAMIN D3) 1,000 unit cap Take  by mouth daily.  CRESTOR 20 mg tablet       metFORMIN (GLUCOPHAGE) 1,000 mg tablet Take 1,000 mg by mouth two (2) times daily (with meals).  ACETAMINOPHEN (TYLENOL EXTRA STRENGTH PO) Take  by mouth.  ONETOUCH DELICA PLUS LANCET 33 gauge misc USE 1 TO CHECK GLUCOSE THREE TIMES DAILY         Allergies   Allergen Reactions    Actos [Pioglitazone] Unable to Obtain    Daypro [Oxaprozin] Unknown (comments)    Lisinopril Unable to Obtain    Other Medication Unable to Obtain     Other allergies are:  Antiinflammatories  Algesics  And several diabetic medications    Pioglitazone-Metformin Swelling    Topamax [Topiramate] Other (comments)     Unknown reaction    Zocor [Simvastatin] Myalgia          PHYSICAL EXAMINATION    Visit Vitals  BP (!) 153/67 (BP 1 Location: Left upper arm)   Pulse 92   Temp 97.2 °F (36.2 °C)   Resp 17   Ht 5' 4\" (1.626 m)   Wt 242 lb (109.8 kg)   SpO2 99%   BMI 41.54 kg/m²       CONSTITUTIONAL: NAD, A&O x 3  SENSATION: Intact to light touch throughout  RANGE OF MOTION: The patient has full passive range of motion in all four extremities. MOTOR:  Straight Leg Raise: Negative, bilateral    Ambulates without assistive device. Hip Flex Knee Ext Knee Flex Ankle DF GTE Ankle PF Tone   Right +4/5 +4/5 +4/5 +4/5 +4/5 +4/5 +4/5   Left +4/5 +4/5 +4/5 +4/5 +4/5 +4/5 +4/5       ASSESSMENT   Diagnoses and all orders for this visit:    1. Spinal stenosis of lumbar region without neurogenic claudication    2.  DDD (degenerative disc disease), lumbar    3. DDD (degenerative disc disease), cervical          IMPRESSION AND PLAN:  Patient returns to the office today with c/o low back and bilateral hip pain radiating into the BLE (RLE>LLE) in a S1 distribution to the foot on the RLE. Multiple treatment options were discussed. She will decrease to Lyrica 150 mg BID since she experienced minimal change with Lyrica 225 mg. She did not require refills of Cymbalta 60 mg daily. I will order an L2-3 epidural. Patient is neurologically intact. I will see the patient back following the injection or earlier if needed. Written by Mary Tyson, as dictated by King Peng MD  I examined the patient, reviewed and agree with the note.

## 2021-04-30 ENCOUNTER — OFFICE VISIT (OUTPATIENT)
Dept: ORTHOPEDIC SURGERY | Age: 61
End: 2021-04-30
Payer: COMMERCIAL

## 2021-04-30 VITALS
BODY MASS INDEX: 41.32 KG/M2 | HEART RATE: 92 BPM | SYSTOLIC BLOOD PRESSURE: 153 MMHG | RESPIRATION RATE: 17 BRPM | DIASTOLIC BLOOD PRESSURE: 67 MMHG | TEMPERATURE: 97.2 F | WEIGHT: 242 LBS | OXYGEN SATURATION: 99 % | HEIGHT: 64 IN

## 2021-04-30 DIAGNOSIS — M48.061 SPINAL STENOSIS OF LUMBAR REGION WITHOUT NEUROGENIC CLAUDICATION: Primary | ICD-10-CM

## 2021-04-30 DIAGNOSIS — M50.30 DDD (DEGENERATIVE DISC DISEASE), CERVICAL: ICD-10-CM

## 2021-04-30 DIAGNOSIS — M51.36 DDD (DEGENERATIVE DISC DISEASE), LUMBAR: ICD-10-CM

## 2021-04-30 PROCEDURE — 99214 OFFICE O/P EST MOD 30 MIN: CPT | Performed by: PHYSICAL MEDICINE & REHABILITATION

## 2021-04-30 RX ORDER — PREGABALIN 150 MG/1
150 CAPSULE ORAL 2 TIMES DAILY
Qty: 60 CAP | Refills: 1 | Status: SHIPPED | OUTPATIENT
Start: 2021-04-30 | End: 2022-03-01 | Stop reason: ALTCHOICE

## 2021-06-30 ENCOUNTER — OFFICE VISIT (OUTPATIENT)
Dept: ORTHOPEDIC SURGERY | Age: 61
End: 2021-06-30
Payer: COMMERCIAL

## 2021-06-30 VITALS
RESPIRATION RATE: 18 BRPM | OXYGEN SATURATION: 100 % | BODY MASS INDEX: 37.98 KG/M2 | SYSTOLIC BLOOD PRESSURE: 145 MMHG | HEART RATE: 87 BPM | DIASTOLIC BLOOD PRESSURE: 70 MMHG | TEMPERATURE: 97.3 F | WEIGHT: 242 LBS | HEIGHT: 67 IN

## 2021-06-30 DIAGNOSIS — M48.061 SPINAL STENOSIS OF LUMBAR REGION WITHOUT NEUROGENIC CLAUDICATION: Primary | ICD-10-CM

## 2021-06-30 DIAGNOSIS — M50.30 DDD (DEGENERATIVE DISC DISEASE), CERVICAL: ICD-10-CM

## 2021-06-30 DIAGNOSIS — M51.36 DDD (DEGENERATIVE DISC DISEASE), LUMBAR: ICD-10-CM

## 2021-06-30 PROCEDURE — 99213 OFFICE O/P EST LOW 20 MIN: CPT | Performed by: PHYSICAL MEDICINE & REHABILITATION

## 2021-06-30 RX ORDER — PREGABALIN 150 MG/1
150 CAPSULE ORAL 2 TIMES DAILY
Qty: 180 CAPSULE | Refills: 0 | Status: SHIPPED | OUTPATIENT
Start: 2021-06-30 | End: 2021-09-21

## 2021-06-30 RX ORDER — DULOXETIN HYDROCHLORIDE 60 MG/1
60 CAPSULE, DELAYED RELEASE ORAL DAILY
Qty: 90 CAPSULE | Refills: 0 | Status: SHIPPED | OUTPATIENT
Start: 2021-06-30 | End: 2021-09-21

## 2021-06-30 NOTE — LETTER
6/30/2021    Patient: Skip Dee   YOB: 1960   Date of Visit: 6/30/2021     Janina Gaucher, MD  [address]  [address] [facility] [phone]  Via Fax: [phone]    Dear Janina Gaucher, MD,      Thank you for referring Ms. Skip Dee to [address] for evaluation. My notes for this consultation are attached. If you have questions, please do not hesitate to call me. I look forward to following your patient along with you.       Sincerely,    Daniel Garcia MD

## 2021-06-30 NOTE — PROGRESS NOTES
[facility]  [address], [place]   Phone: [phone]  Fax: [phone]        PROGRESS NOTE      HISTORY OF PRESENT ILLNESS:  The patient is a 61 y.o. female and was seen today for follow up of low back and bilateral hip pain radiating into the BLE (RLE>LLE) in a S1 distribution to the foot on the RLE. Previously, she was seen for low back and bilateral hip (R>L) pain. Previously, she was seen for low back pain. Previously, she was seen with c/o paraesthesias in her shoulder. Previously, she was seen for pain localized to the low back. She denied radicular symptoms at the time. She was previously seen with low back pain radiating into the BLE (L>R) in an S1 distribution to the foot. She reports her pain is a near-constant 9/10. She describes symptoms consistent with spinal stenosis. Her pain was previously low back pain radiating into the BLE in an S1 distribution to the mid thigh. She was previously seen with low back pain into the BLE extending in an S1 distribution to the knees. Pain is exacerbated by standing and ambulating. Per patient, she has a torn meniscus of the right knee which could possibly be contributing to her low back pain. Pt reports her baseline level of pain at 4/10. The patient additionally had complaints of neck pain. Pt underwent L3/4 epidural from 1/31/17 reporting some relief but notes prior block offered better relief. Pt underwent L3/4 epidural on 7/25/17 and reported she was symptom-free. Pt underwent an L3/4 epidural on 9/4/18 with no relief at this time. Pt underwent epidural L3-4 on 11/10/2020 with relief. She previously received a knee injection from Dr. MANCILLA 7/8471 with relief. Pt failed NEURONTIN and TOPAMAX. Pt previously tolerated the increased dose of Lyrica to 225 mg BID with no additional benefit. Pt is compliant with Cymbalta 30 mg per day. She reports rare use of Tramadol.  Pt takes baby Aspirin. Pt was prescribed Klonopin for insomnia but states she has discontinued use. She completed MDP with minimal transient relief. Pt is completing her HEP daily. She has hx of DM and reports monitoring her blood sugars 3x/day. Her DM is followed by Dr. Lauren Hernandez ([phone]).  She reports her blood sugars are well controlled. She denies hx of glaucoma. At that time, patient was doing well. Note from [facility], NP dated 6/27/18 indicating patient was seen with c/o low back and BLE pain, most severe in the bilateral buttocks. Patient decreased her Cymbalta to 30 mg daily, and did well for a while. She went on vacation and noted an increase in pain following this, so she increased her Cymbalta back to 60 mg daily. She was scheduled to f/u in 6 month's time. Her pain at that time was 4-8/10 with no specific injury or trauma. She was treated with MDP at this time and continued on Cymbalta. Pt is tolerating the increased dose of Lyrica 150 mg BID with some benefit. She had initial dizziness but notes improvement. She continues on Cymbalta 60 mg daily. Upper extremity EMG per report revealed evidence of moderately severe carpal tunnel syndrome bilaterally, worse on the right. Cervical spine MRI per report revealed degenerative disc disease at multiple levels with foraminal encroachment and uncovertebral joint spurring. L spine MRI dated 4/21/2021 was reviewed and demonstrated L3-4: Facet arthropathy anterolisthesis and medium size broad disc bulge combine to cause severe central stenosis with impingement of both lateral recesses. There is moderate right neural foraminal narrowing with impingement of the exiting nerve root. Mild to moderate left neural foraminal narrowing. L4-5: Facet arthropathy. Large broad disc bulge with superimposed central protrusion results in extremely severe central stenosis with impingement of both lateral recesses. There is mild to moderate right and moderate left neural foraminal narrowing.  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 At her last clinical appointment, she decreased to Lyrica 150 mg BID since she experienced minimal change with Lyrica 225 mg. She did not require refills of Cymbalta 60 mg daily. I ordered an L2-3 epidural.      The patient returns today and reports pain location and distribution remains unchanged. She rates her pain 2/10, previously 3-7/10. Pt did not follow through with the block as previously scheduled. She continues on Lyrica 150 mg BID and Cymbalta 60 mg daily. Pt denies change in bowel or bladder habits. Pt reports recent left knee surgery with Dr. Rajinder Menezes for a torn meniscus.  reviewed and indicated pt recently received a prescription for Hydrocodone from Dr. Rajinder Menezes. Body mass index is 37.9 kg/m². PCP: David Garcia MD      Past Medical History:   Diagnosis Date    Arthritis     Cervical radiculopathy     Diabetes ([facility] [address]     Hypercholesteremia     Hypertension         Social History     Socioeconomic History    Marital status:      Spouse name: Not on file    Number of children: Not on file    Years of education: Not on file    Highest education level: Not on file   Occupational History    Not on file   Tobacco Use    Smoking status: Never Smoker    Smokeless tobacco: Never Used   Vaping Use    Vaping Use: Never assessed   Substance and Sexual Activity    Alcohol use:  Yes     Alcohol/week: 0.8 standard drinks     Types: 1 Glasses of wine per week    Drug use: No    Sexual activity: Not on file   Other Topics Concern    Not on file   Social History Narrative    Not on file     Social Determinants of Health     Financial Resource Strain:     Difficulty of Paying Living Expenses:    Food Insecurity:     Worried About Running Out of Food in the Last Year:     [address] in the Last Year:    Transportation Needs:     Lack of Transportation (Medical):  Lack of Transportation (Non-Medical):    Physical Activity:     Days of Exercise per Week:     Minutes of Exercise per Session:    Stress:     Feeling of Stress :    Social Connections:     Frequency of Communication with Friends and Family:     Frequency of Social Gatherings with Friends and Family:     Attends Baptism Services:     Active Member of Clubs or Organizations:     Attends Club or Organization Meetings:     Marital Status:    Intimate Partner Violence:     Fear of Current or Ex-Partner:     Emotionally Abused:     Physically Abused:     Sexually Abused:        Current Outpatient Medications   Medication Sig Dispense Refill    DULoxetine (CYMBALTA) 60 mg capsule Take 1 Capsule by mouth daily. 90 Capsule 0    pregabalin (Lyrica) 150 mg capsule Take 1 Cap by mouth two (2) times a day. Max Daily Amount: 300 mg. 60 Cap 1    DULoxetine (CYMBALTA) 60 mg capsule Take 1 Cap by mouth daily. 90 Cap 1    insulin detemir U-100 (LEVEMIR FLEXTOUCH) 100 unit/mL (3 mL) inpn INJECT 30  UNITS BENEATH THE SKIN EVERY EVENING      glucose blood VI test strips (ASCENSIA AUTODISC VI, ONE TOUCH ULTRA TEST VI) strip 50 Each.  Blood-Glucose Meter misc 1 Each.  ibuprofen (MOTRIN) 600 mg tablet Take 600 mg by mouth.  HUMALOG KWIKPEN INSULIN 100 unit/mL kwikpen INJECT 6 TO 8 UNITS SUBCUTANEOUSLY THREE TIMES DAILY BEFORE MEALS      ketorolac (ACULAR) 0.5 % ophthalmic solution INSTILL 1 DROP INTO RIGHT EYE THREE TIMES DAILY      lancets (ONE TOUCH DELICA) 33 gauge misc USE TO CHECK BLOOD SUGAR THREE TIMES A DAY      ONETOUCH DELICA PLUS LANCET 33 gauge misc USE 1 TO CHECK GLUCOSE THREE TIMES DAILY      losartan-hydroCHLOROthiazide (HYZAAR) 100-25 mg per tablet Take 1 Tab by mouth.  magnesium oxide (MAG-OX) 400 mg tablet Take 400 mg by mouth.       Insulin Needles, Disposable, (EVAN PEN NEEDLE) 32 gauge x 5/32\" ndle USE TO INJECT INSULIN FOUR TIMES A DAY      aspirin delayed-release 81 mg tablet Take  by mouth daily.  multivitamin (ONE A DAY) tablet Take 1 Tab by mouth daily.  cholecalciferol (VITAMIN D3) 1,000 unit cap Take  by mouth daily.  CRESTOR 20 mg tablet       metFORMIN (GLUCOPHAGE) 1,000 mg tablet Take 1,000 mg by mouth two (2) times daily (with meals).  ACETAMINOPHEN (TYLENOL EXTRA STRENGTH PO) Take  by mouth.  pregabalin (LYRICA) 225 mg capsule Take 1 Cap by mouth two (2) times a day. Max Daily Amount: 450 mg. 60 Cap 0       Allergies   Allergen Reactions    Actos [Pioglitazone] Unable to Obtain    Daypro [Oxaprozin] Unknown (comments)    Lisinopril Unable to Obtain    Other Medication Unable to Obtain     Other allergies are:  Antiinflammatories  Algesics  And several diabetic medications    Pioglitazone-Metformin Swelling    Topamax [Topiramate] Other (comments)     Unknown reaction    Zocor [Simvastatin] Myalgia          PHYSICAL EXAMINATION    Visit Vitals  BP (!) 145/70 (BP 1 Location: Left upper arm, BP Patient Position: Sitting, BP Cuff Size: Adult)   Pulse 87   Temp 97.3 °F (36.3 °C) (Temporal)   Resp 18   Ht 5' 7\" (1.702 m)   Wt 242 lb (109.8 kg)   SpO2 100%   BMI 37.90 kg/m²       CONSTITUTIONAL: NAD, A&O x 3  SENSATION: Intact to light touch throughout  RANGE OF MOTION: The patient has full passive range of motion in all four extremities. MOTOR:  Straight Leg Raise: Negative, bilateral                 Hip Flex Knee Ext Knee Flex Ankle DF GTE Ankle PF Tone   Right +4/5 +4/5 +4/5 +4/5 +4/5 +4/5 +4/5   Left +4/5 +4/5 +4/5 +4/5 +4/5 +4/5 +4/5       ASSESSMENT   Diagnoses and all orders for this visit:    1. Spinal stenosis of lumbar region without neurogenic claudication  -     DULoxetine (CYMBALTA) 60 mg capsule; Take 1 Capsule by mouth daily. -     pregabalin (Lyrica) 150 mg capsule; Take 1 Capsule by mouth two (2) times a day.  Max Daily Amount: 300 mg.    2. DDD (degenerative disc disease), lumbar  -     DULoxetine (CYMBALTA) 60 mg capsule; Take 1 Capsule by mouth daily. -     pregabalin (Lyrica) 150 mg capsule; Take 1 Capsule by mouth two (2) times a day. Max Daily Amount: 300 mg.    3. DDD (degenerative disc disease), cervical  -     DULoxetine (CYMBALTA) 60 mg capsule; Take 1 Capsule by mouth daily. -     pregabalin (Lyrica) 150 mg capsule; Take 1 Capsule by mouth two (2) times a day. Max Daily Amount: 300 mg. IMPRESSION AND PLAN:  Patient returns to the office today with c/o low back and bilateral hip pain radiating into the BLE (RLE>LLE) in a S1 distribution to the foot on the RLE. Multiple treatment options were discussed. Patient wished to continue her current treatment. I provided her refills of Lyrica 150 mg BID and Cymbalta 60 mg daily. Patient is neurologically intact. I will see the patient back in 3 month's time or earlier if needed. Written by Yuniel Dao, as dictated by Sravani De La Cruz MD  I examined the patient, reviewed and agree with the note.

## 2021-09-21 ENCOUNTER — OFFICE VISIT (OUTPATIENT)
Dept: ORTHOPEDIC SURGERY | Age: 61
End: 2021-09-21
Payer: COMMERCIAL

## 2021-09-21 VITALS
RESPIRATION RATE: 17 BRPM | HEART RATE: 103 BPM | OXYGEN SATURATION: 97 % | DIASTOLIC BLOOD PRESSURE: 80 MMHG | BODY MASS INDEX: 36.65 KG/M2 | TEMPERATURE: 97.9 F | SYSTOLIC BLOOD PRESSURE: 162 MMHG | WEIGHT: 234 LBS

## 2021-09-21 DIAGNOSIS — M51.36 DDD (DEGENERATIVE DISC DISEASE), LUMBAR: ICD-10-CM

## 2021-09-21 DIAGNOSIS — M48.061 SPINAL STENOSIS OF LUMBAR REGION WITHOUT NEUROGENIC CLAUDICATION: Primary | ICD-10-CM

## 2021-09-21 DIAGNOSIS — M50.30 DDD (DEGENERATIVE DISC DISEASE), CERVICAL: ICD-10-CM

## 2021-09-21 PROCEDURE — 99213 OFFICE O/P EST LOW 20 MIN: CPT | Performed by: PHYSICAL MEDICINE & REHABILITATION

## 2021-09-21 RX ORDER — DULOXETIN HYDROCHLORIDE 60 MG/1
60 CAPSULE, DELAYED RELEASE ORAL
Qty: 90 CAPSULE | Refills: 1 | Status: SHIPPED | OUTPATIENT
Start: 2021-09-21 | End: 2022-03-01

## 2021-09-21 RX ORDER — PREGABALIN 150 MG/1
150 CAPSULE ORAL 2 TIMES DAILY
Qty: 180 CAPSULE | Refills: 1 | Status: SHIPPED | OUTPATIENT
Start: 2021-09-21 | End: 2022-03-01

## 2021-09-21 NOTE — PROGRESS NOTES
[facility]  [address], [place]   Phone: [phone]  Fax: [phone]        PROGRESS NOTE      HISTORY OF PRESENT ILLNESS:  The patient is a 64 y.o. female and was seen today for follow up of low back and bilateral hip pain radiating into the BLE (RLE>LLE) in a S1 distribution to the foot on the RLE. Previously, she was seen for low back and bilateral hip (R>L) pain. Previously, she was seen for low back pain. Previously, she was seen with c/o paraesthesias in her shoulder. Previously, she was seen for pain localized to the low back. She denied radicular symptoms at the time. She was previously seen with low back pain radiating into the BLE (L>R) in an S1 distribution to the foot. She reports her pain is a near-constant 9/10. She describes symptoms consistent with spinal stenosis. Her pain was previously low back pain radiating into the BLE in an S1 distribution to the mid thigh. She was previously seen with low back pain into the BLE extending in an S1 distribution to the knees. Pain is exacerbated by standing and ambulating. Per patient, she has a torn meniscus of the right knee which could possibly be contributing to her low back pain. Pt reports her baseline level of pain at 4/10. The patient additionally had complaints of neck pain. Pt underwent L3/4 epidural from 1/31/17 reporting some relief but notes prior block offered better relief. Pt underwent L3/4 epidural on 7/25/17 and reported she was symptom-free. Pt underwent an L3/4 epidural on 9/4/18 with no relief at this time. Pt underwent epidural L3-4 on 11/10/2020 with relief. She previously received a knee injection from  [facility] 1/6660 with relief. Pt reports recent left knee surgery with Dr. Leanna Galvez for a torn meniscus. Pt failed NEURONTIN and TOPAMAX. Pt previously tolerated the increased dose of Lyrica to 225 mg BID with no additional benefit. Pt is compliant with Cymbalta 30 mg per day.  She reports rare use of Tramadol. Pt takes baby Aspirin. Pt was prescribed Klonopin for insomnia but states she has discontinued use. She completed MDP with minimal transient relief. Pt is completing her HEP daily. She has hx of DM and reports monitoring her blood sugars 3x/day. Her DM is followed by Dr. Bhavin Parada ([phone]).  She reports her blood sugars are well controlled. She denies hx of glaucoma. At that time, patient was doing well. Note from Jacqueline Blanc NP dated 6/27/18 indicating patient was seen with c/o low back and BLE pain, most severe in the bilateral buttocks. Patient decreased her Cymbalta to 30 mg daily, and did well for a while. She went on vacation and noted an increase in pain following this, so she increased her Cymbalta back to 60 mg daily. She was scheduled to f/u in 6 month's time. Her pain at that time was 4-8/10 with no specific injury or trauma. She was treated with MDP at this time and continued on Cymbalta. Pt is tolerating the increased dose of Lyrica 150 mg BID with some benefit. She had initial dizziness but notes improvement. She continues on Cymbalta 60 mg daily. Upper extremity EMG per report revealed evidence of moderately severe carpal tunnel syndrome bilaterally, worse on the right. Cervical spine MRI per report revealed degenerative disc disease at multiple levels with foraminal encroachment and uncovertebral joint spurring. L spine MRI dated 4/21/2021 was reviewed and demonstrated L3-4: Facet arthropathy anterolisthesis and medium size broad disc bulge combine to cause severe central stenosis with impingement of both lateral recesses. There is moderate right neural foraminal narrowing with impingement of the exiting nerve root. Mild to moderate left neural foraminal narrowing. L4-5: Facet arthropathy.  Large broad disc bulge with superimposed central protrusion results in extremely severe central stenosis with impingement of both lateral recesses. There is mild to moderate right and moderate left neural foraminal narrowing. 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 At her last clinical appointment, patient had wished to continue her current treatment. I provided her refills of Lyrica 150 mg BID and Cymbalta 60 mg daily.       The patient returns today pain free x end of June. She rates her pain 0/10, previously 2/10. 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 She continues taking Lyrica 150 mg BID and Cymbalta 60 mg daily. Pt denies change in bowel or bladder habits.  reviewed. Body mass index is 36.65 kg/m². PCP: Fay Barrett MD      Past Medical History:   Diagnosis Date    Arthritis     Cervical radiculopathy     Diabetes ([facility] [address]     Hypercholesteremia     Hypertension         Social History     Socioeconomic History    Marital status:      Spouse name: Not on file    Number of children: Not on file    Years of education: Not on file    Highest education level: Not on file   Occupational History    Not on file   Tobacco Use    Smoking status: Never Smoker    Smokeless tobacco: Never Used   Vaping Use    Vaping Use: Never assessed   Substance and Sexual Activity    Alcohol use: Yes     Alcohol/week: 0.8 standard drinks     Types: 1 Glasses of wine per week    Drug use: No    Sexual activity: Not on file   Other Topics Concern    Not on file   Social History Narrative    Not on file     Social Determinants of Health     Financial Resource Strain:     Difficulty of Paying Living Expenses:    Food Insecurity:     Worried About Running Out of Food in the Last Year:     [address] in the Last Year:    Transportation Needs:     Lack of Transportation (Medical):      Lack of Transportation (Non-Medical):    Physical Activity:     Days of Exercise per Week:     Minutes of Exercise per Session:    Stress:     Feeling of Stress :    Social Connections:     Frequency of Communication with Friends and Family:     Frequency of Social Gatherings with Friends and Family:     Attends Yarsanism Services:     Active Member of Clubs or Organizations:     Attends Club or Organization Meetings:     Marital Status:    Intimate Partner Violence:     Fear of Current or Ex-Partner:     Emotionally Abused:     Physically Abused:     Sexually Abused:        Current Outpatient Medications   Medication Sig Dispense Refill    pregabalin (Lyrica) 150 mg capsule Take 1 Cap by mouth two (2) times a day. Max Daily Amount: 300 mg. 60 Cap 1    DULoxetine (CYMBALTA) 60 mg capsule Take 1 Cap by mouth daily. 90 Cap 1    insulin detemir U-100 (LEVEMIR FLEXTOUCH) 100 unit/mL (3 mL) inpn INJECT 30  UNITS BENEATH THE SKIN EVERY EVENING      ibuprofen (MOTRIN) 600 mg tablet Take 600 mg by mouth.  HUMALOG KWIKPEN INSULIN 100 unit/mL kwikpen INJECT 6 TO 8 UNITS SUBCUTANEOUSLY THREE TIMES DAILY BEFORE MEALS      ketorolac (ACULAR) 0.5 % ophthalmic solution INSTILL 1 DROP INTO RIGHT EYE THREE TIMES DAILY      losartan-hydroCHLOROthiazide (HYZAAR) 100-25 mg per tablet Take 1 Tab by mouth.  magnesium oxide (MAG-OX) 400 mg tablet Take 400 mg by mouth.  aspirin delayed-release 81 mg tablet Take  by mouth daily.  multivitamin (ONE A DAY) tablet Take 1 Tab by mouth daily.  cholecalciferol (VITAMIN D3) 1,000 unit cap Take  by mouth daily.  CRESTOR 20 mg tablet       metFORMIN (GLUCOPHAGE) 1,000 mg tablet Take 1,000 mg by mouth two (2) times daily (with meals).  ACETAMINOPHEN (TYLENOL EXTRA STRENGTH PO) Take  by mouth.  glucose blood VI test strips (ASCENSIA AUTODISC VI, ONE TOUCH ULTRA TEST VI) strip 50 Each.  Blood-Glucose Meter misc 1 Each.       lancets (ONE TOUCH DELICA) 33 gauge misc USE TO CHECK BLOOD SUGAR THREE TIMES A DAY      ONETOUCH DELICA PLUS LANCET 33 gauge misc USE 1 TO CHECK GLUCOSE THREE TIMES DAILY      Insulin Needles, Disposable, (EVAN PEN NEEDLE) 32 gauge x 5/32\" ndle USE TO INJECT INSULIN FOUR TIMES A DAY         Allergies   Allergen Reactions    Actos [Pioglitazone] Unable to Obtain    Daypro [Oxaprozin] Unknown (comments)    Lisinopril Unable to Obtain    Other Medication Unable to Obtain     Other allergies are:  Antiinflammatories  Algesics  And several diabetic medications    Pioglitazone-Metformin Swelling    Topamax [Topiramate] Other (comments)     Unknown reaction    Zocor [Simvastatin] Myalgia          PHYSICAL EXAMINATION    Visit Vitals  BP (!) 162/80 (BP 1 Location: Left upper arm)   Pulse (!) 103   Temp 97.9 °F (36.6 °C)   Resp 17   Wt 234 lb (106.1 kg)   SpO2 97%   BMI 36.65 kg/m²       CONSTITUTIONAL: NAD, A&O x 3  SENSATION: Intact to light touch throughout  RANGE OF MOTION: The patient has full passive range of motion in all four extremities. MOTOR:  Straight Leg Raise: Negative, bilateral               Hip Flex Knee Ext Knee Flex Ankle DF GTE Ankle PF Tone   Right +4/5 +4/5 +4/5 +4/5 +4/5 +4/5 +4/5   Left +4/5 +4/5 +4/5 +4/5 +4/5 +4/5 +4/5       ASSESSMENT   Diagnoses and all orders for this visit:    1. Spinal stenosis of lumbar region without neurogenic claudication  -     pregabalin (Lyrica) 150 mg capsule; Take 1 Capsule by mouth two (2) times a day. Max Daily Amount: 300 mg.  -     DULoxetine (CYMBALTA) 60 mg capsule; Take 1 Capsule by mouth nightly. 2. DDD (degenerative disc disease), lumbar  -     pregabalin (Lyrica) 150 mg capsule; Take 1 Capsule by mouth two (2) times a day. Max Daily Amount: 300 mg.  -     DULoxetine (CYMBALTA) 60 mg capsule; Take 1 Capsule by mouth nightly. 3. DDD (degenerative disc disease), cervical  -     pregabalin (Lyrica) 150 mg capsule; Take 1 Capsule by mouth two (2) times a day. Max Daily Amount: 300 mg.  -     DULoxetine (CYMBALTA) 60 mg capsule;  Take 1 Capsule by mouth nightly. IMPRESSION AND PLAN:  Patient returns to the office today pain free. Multiple treatment options were discussed. Patient wished to continue her current treatment. I provided her refills of Lyrica 150 mg BID and Cymbalta 60 mg daily. Patient is neurologically intact. I will see the patient back in 6 month's time or earlier if needed. Written by Bethel Duran, as dictated by Melissa Cardona MD  I examined the patient, reviewed and agree with the note.

## 2021-09-21 NOTE — LETTER
9/21/2021    Patient: Dominick Cabral   YOB: 1960   Date of Visit: 9/21/2021     Marlene Nguyễn MD  [address]  [address] [facility] [phone]  Via Fax: [phone]    Dear Marlene Nguyễn MD,      Thank you for referring Ms. Dominick Cabral to [address] for evaluation. My notes for this consultation are attached. If you have questions, please do not hesitate to call me. I look forward to following your patient along with you.       Sincerely,    Bernie Pack MD

## 2022-02-28 NOTE — PROGRESS NOTES
[facility]  [address], [place]   Phone: [phone]  Fax: [phone]        PROGRESS NOTE      HISTORY OF PRESENT ILLNESS:  The patient is a 64 y.o. female and was seen today for follow up of low back and bilateral hip pain radiating into the BLE (RLE>LLE) in a S1 distribution to the foot on the RLE. Previously, she was seen for low back and bilateral hip (R>L) pain. Previously, she was seen for low back pain. Previously, she was seen with c/o paraesthesias in her shoulder. Previously, she was seen for pain localized to the low back. She denied radicular symptoms at the time. She was previously seen with low back pain radiating into the BLE (L>R) in an S1 distribution to the foot. She reports her pain is a near-constant 9/10. She describes symptoms consistent with spinal stenosis. Her pain was previously low back pain radiating into the BLE in an S1 distribution to the mid thigh. She was previously seen with low back pain into the BLE extending in an S1 distribution to the knees. Pain is exacerbated by standing and ambulating. Per patient, she has a torn meniscus of the right knee which could possibly be contributing to her low back pain. Pt reports her baseline level of pain at 4/10. The patient additionally had complaints of neck pain. Pt underwent L3/4 epidural from 1/31/17 reporting some relief but notes prior block offered better relief. Pt underwent L3/4 epidural on 7/25/17 and reported she was symptom-free. Pt underwent an L3/4 epidural on 9/4/18 with no relief at this time. Pt underwent epidural L3-4 on 11/10/2020 with relief. She previously received a knee injection from Dr. MARK 1/2355 with relief. Pt reports recent left knee surgery with Dr. Rosa Doe for a torn meniscus. Pt failed NEURONTIN and TOPAMAX. Pt previously tolerated the increased dose of Lyrica to 225 mg BID with no additional benefit. Pt is compliant with Cymbalta 30 mg per day.  She reports rare use of Tramadol. Pt takes baby Aspirin. Pt was prescribed Klonopin for insomnia but states she has discontinued use. Pt is completing her HEP daily. She has hx of DM and reports monitoring her blood sugars 3x/day. Her DM is followed by Dr. Prashant Ortiz ([phone]).  She reports her blood sugars are well controlled. She denies hx of glaucoma. At that time, patient was doing well. Note from Bradford Woodruff NP dated 6/27/18 indicating patient was seen with c/o low back and BLE pain, most severe in the bilateral buttocks. Patient decreased her Cymbalta to 30 mg daily, and did well for a while. She went on vacation and noted an increase in pain following this, so she increased her Cymbalta back to 60 mg daily. She was scheduled to f/u in 6 month's time. Her pain at that time was 4-8/10 with no specific injury or trauma. She was treated with MDP at this time and continued on Cymbalta. Pt is tolerating the increased dose of Lyrica 150 mg BID with some benefit. She had initial dizziness but notes improvement. She continues on Cymbalta 60 mg daily. Upper extremity EMG per report revealed evidence of moderately severe carpal tunnel syndrome bilaterally, worse on the right. Cervical spine MRI per report revealed degenerative disc disease at multiple levels with foraminal encroachment and uncovertebral joint spurring. L spine MRI dated 4/21/2021 was reviewed and demonstrated L3-4: Facet arthropathy anterolisthesis and medium size broad disc bulge combine to cause severe central stenosis with impingement of both lateral recesses. There is moderate right neural foraminal narrowing with impingement of the exiting nerve root. Mild to moderate left neural foraminal narrowing. L4-5: Facet arthropathy.  Large broad disc bulge with superimposed central protrusion results in extremely severe central stenosis with impingement of both lateral recesses. There is mild to moderate right and moderate left neural foraminal narrowing. 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 At her last clinical appointment, patient wished to continue her current treatment. I provided her refills of Lyrica 150 mg BID and Cymbalta 60 mg daily.          The patient returns today with progressive neck pain w/ RUE paresthesias into the elbow x 1 month. She rates her pain 0-7/10, previously 0/10. Pt states her back and legs have been doing well. This is an earlier than planned f/u. She reports a fall in 9/2021 with immediate onset of neck and right shoulder pain. She states she had presented to urgent care on 9/10/2021 following the fall and had noted improvement w/ treatment provided through urgent care. She had been doing well for several months but notes progression in pain 1 month ago with new onset of paresthesias into the upper RUE to the elbow, states it also radiates to the right side of her face and right ear. Her neck pain is aggravated with cervical rotation and bending to the right. Pt denies dropping things or loss of balance. She denies any additional falls. She continues taking Cymbalta 60 mg daily and Lyrica 150 mg BID. Pt dx with vertigo, 1/2021 and is currently in PT for her dizziness related to vertigo, 3 weeks of PT left. The patient has a history of DM and reports blood sugars are well controlled, consistently remaining below 200. DM is managed by Cheryle Mercedes M.D. ([phone]) in [place].  reviewed. Body mass index is 37.12 kg/m².     PCP: Aleksander Stanley MD      Past Medical History:   Diagnosis Date    Arthritis     Cervical radiculopathy     Diabetes ([facility] [address]     Hypercholesteremia     Hypertension         Social History     Socioeconomic History    Marital status:      Spouse name: Not on file    Number of children: Not on file    Years of education: Not on file    Highest education level: Not on file   Occupational History    Not on file   Tobacco Use    Smoking status: Never Smoker    Smokeless tobacco: Never Used   Vaping Use    Vaping Use: Not on file   Substance and Sexual Activity    Alcohol use: Yes     Alcohol/week: 0.8 standard drinks     Types: 1 Glasses of wine per week    Drug use: No    Sexual activity: Not on file   Other Topics Concern    Not on file   Social History Narrative    Not on file     Social Determinants of Health     Financial Resource Strain:     Difficulty of Paying Living Expenses: Not on file   Food Insecurity:     Worried About Running Out of Food in the Last Year: Not on file    Sage of Food in the Last Year: Not on file   Transportation Needs:     Lack of Transportation (Medical): Not on file    Lack of Transportation (Non-Medical): Not on file   Physical Activity:     Days of Exercise per Week: Not on file    Minutes of Exercise per Session: Not on file   Stress:     Feeling of Stress : Not on file   Social Connections:     Frequency of Communication with Friends and Family: Not on file    Frequency of Social Gatherings with Friends and Family: Not on file    Attends Confucianist Services: Not on file    Active Member of [address] [place] [address] [employer] or Organizations: Not on file    Attends Club or Organization Meetings: Not on file    Marital Status: Not on file   Intimate Partner Violence:     Fear of Current or Ex-Partner: Not on file    Emotionally Abused: Not on file    Physically Abused: Not on file    Sexually Abused: Not on file   Housing Stability:     Unable to Pay for Housing in the Last Year: Not on file    Number of [place] in the Last Year: Not on file    Unstable Housing in the Last Year: Not on file       Current Outpatient Medications   Medication Sig Dispense Refill    ferrous sulfate (Slow Fe) 142 mg (45 mg iron) ER tablet Take 1 Tablet by mouth daily.       insulin glargine (Basaglar KwikPen U-100 Insulin) 100 unit/mL (3 mL) inpn 30 Units by SubCUTAneous route.  phentermine (ADIPEX-P) 37.5 mg tablet Take 37.5 mg by mouth daily.  triamcinolone (ARISTOCORT) 0.5 % topical cream two (2) times a day.  DULoxetine (CYMBALTA) 60 mg capsule Take 1 Capsule by mouth nightly. 90 Capsule 1    ibuprofen (MOTRIN) 600 mg tablet Take 600 mg by mouth.  HUMALOG KWIKPEN INSULIN 100 unit/mL kwikpen INJECT 6 TO 8 UNITS SUBCUTANEOUSLY THREE TIMES DAILY BEFORE MEALS      ketorolac (ACULAR) 0.5 % ophthalmic solution INSTILL 1 DROP INTO RIGHT EYE THREE TIMES DAILY      losartan-hydroCHLOROthiazide (HYZAAR) 100-25 mg per tablet Take 1 Tab by mouth.  magnesium oxide (MAG-OX) 400 mg tablet Take 400 mg by mouth.  aspirin delayed-release 81 mg tablet Take  by mouth daily.  multivitamin (ONE A DAY) tablet Take 1 Tab by mouth daily.  cholecalciferol (VITAMIN D3) 1,000 unit cap Take  by mouth daily.  CRESTOR 20 mg tablet       metFORMIN (GLUCOPHAGE) 1,000 mg tablet Take 1,000 mg by mouth two (2) times daily (with meals).  ACETAMINOPHEN (TYLENOL EXTRA STRENGTH PO) Take  by mouth.  glucose blood VI test strips (ASCENSIA AUTODISC VI, ONE TOUCH ULTRA TEST VI) strip 50 Each.  Blood-Glucose Meter misc 1 Each.       lancets (ONE TOUCH DELICA) 33 gauge misc USE TO CHECK BLOOD SUGAR THREE TIMES A DAY      ONETOUCH DELICA PLUS LANCET 33 gauge misc USE 1 TO CHECK GLUCOSE THREE TIMES DAILY      Insulin Needles, Disposable, (EVAN PEN NEEDLE) 32 gauge x 5/32\" ndle USE TO INJECT INSULIN FOUR TIMES A DAY         Allergies   Allergen Reactions    Actos [Pioglitazone] Unable to Obtain    Daypro [Oxaprozin] Unknown (comments)    Lisinopril Unable to Obtain    Other Medication Unable to Obtain     Other allergies are:  Antiinflammatories  Algesics  And several diabetic medications    Pioglitazone-Metformin Swelling    Topamax [Topiramate] Other (comments)     Unknown reaction    Zocor [Simvastatin] Myalgia PHYSICAL EXAMINATION    Visit Vitals  Pulse 91   Temp 97.4 °F (36.3 °C)   Resp 19   Wt 237 lb (107.5 kg)   SpO2 100%   BMI 37.12 kg/m²       CONSTITUTIONAL: NAD, A&O x 3  SENSATION: Intact to light touch throughout  NEURO: Long's is negative bilaterally. RANGE OF MOTION: The patient has full passive range of motion in all four extremities. Tandem gait: LOB, recently dx with vertigo  MOTOR:  Straight Leg Raise: Negative, bilateral       Shoulder AB/Flex Elbow Flex Wrist Ext Elbow Ext Wrist Flex Hand Intrin Tone   Right +4/5 +4/5 +4/5 +4/5 +4/5 +4/5 +4/5   Left +4/5 +4/5 +4/5 +4/5 +4/5 +4/5 +4/5              Hip Flex Knee Ext Knee Flex Ankle DF GTE Ankle PF Tone   Right +4/5 +4/5 +4/5 +4/5 +4/5 +4/5 +4/5   Left +4/5 +4/5 +4/5 +4/5 +4/5 +4/5 +4/5     RADIOGRAPHS  Cerivcal spine plain films dated 3/1/2022. 2 views: AP and lateral. Revealed: Moderately severe disc space narrowing at C3-4 and C5-6. Mild disc space narrowing at C6-7. Moderate disc space narrowing at C7-T1. No acute pathology identified. ASSESSMENT   Diagnoses and all orders for this visit:    1. Neck pain  -     AMB POC XRAY, SPINE, CERVICAL; 2 OR 3    2. Spinal stenosis of lumbar region without neurogenic claudication    3. DDD (degenerative disc disease), lumbar    4. DDD (degenerative disc disease), cervical    5. Cervical neuritis      IMPRESSION AND PLAN:  Patient returns to the office today with c/o progressive neck pain w/ RUE paresthesias into the elbow. Multiple treatment options were discussed. I will start her on Medrol Dosepak, pending approval from Dr. Laureen Hahn  ([phone]) secondary to DM. NSAIDs deferred secondary to taking Aspirin. I offered increasing her Lyrica, pt wished to proceed. I will increase her Lyrica 150 mg BID to 225 mg BID. Patient advised to call office if intolerant to higher dose. I provided her refills of Cymbalta 60 mg daily.  I will refer her to physical therapy for her neck with an emphasis on HEP.Pt should continue with her vertigo PT at this time. Patient is neurologically intact. I will see the patient back in 6 week's time or earlier if needed. Written by Aleida Ga, as dictated by Jade Winters MD  I examined the patient, reviewed and agree with the note.

## 2022-03-01 ENCOUNTER — OFFICE VISIT (OUTPATIENT)
Dept: ORTHOPEDIC SURGERY | Age: 62
End: 2022-03-01
Payer: COMMERCIAL

## 2022-03-01 VITALS
HEART RATE: 91 BPM | OXYGEN SATURATION: 100 % | BODY MASS INDEX: 37.12 KG/M2 | WEIGHT: 237 LBS | TEMPERATURE: 97.4 F | RESPIRATION RATE: 19 BRPM

## 2022-03-01 DIAGNOSIS — M50.30 DDD (DEGENERATIVE DISC DISEASE), CERVICAL: ICD-10-CM

## 2022-03-01 DIAGNOSIS — M51.36 DDD (DEGENERATIVE DISC DISEASE), LUMBAR: ICD-10-CM

## 2022-03-01 DIAGNOSIS — M54.12 CERVICAL NEURITIS: ICD-10-CM

## 2022-03-01 DIAGNOSIS — M54.2 NECK PAIN: Primary | ICD-10-CM

## 2022-03-01 DIAGNOSIS — M48.061 SPINAL STENOSIS OF LUMBAR REGION WITHOUT NEUROGENIC CLAUDICATION: ICD-10-CM

## 2022-03-01 PROCEDURE — 99214 OFFICE O/P EST MOD 30 MIN: CPT | Performed by: PHYSICAL MEDICINE & REHABILITATION

## 2022-03-01 PROCEDURE — 72040 X-RAY EXAM NECK SPINE 2-3 VW: CPT | Performed by: PHYSICAL MEDICINE & REHABILITATION

## 2022-03-01 RX ORDER — INSULIN GLARGINE 100 [IU]/ML
30 INJECTION, SOLUTION SUBCUTANEOUS
COMMUNITY
Start: 2021-10-05 | End: 2022-08-09

## 2022-03-01 RX ORDER — FERROUS SULFATE 137(45) MG
1 TABLET, EXTENDED RELEASE ORAL DAILY
COMMUNITY
Start: 2022-01-26

## 2022-03-01 RX ORDER — METHYLPREDNISOLONE 4 MG/1
TABLET ORAL
Qty: 1 DOSE PACK | Refills: 0 | Status: SHIPPED | OUTPATIENT
Start: 2022-03-01 | End: 2022-08-09 | Stop reason: ALTCHOICE

## 2022-03-01 RX ORDER — DULOXETIN HYDROCHLORIDE 60 MG/1
60 CAPSULE, DELAYED RELEASE ORAL
Qty: 90 CAPSULE | Refills: 1 | Status: SHIPPED | OUTPATIENT
Start: 2022-03-01 | End: 2022-08-09 | Stop reason: SDUPTHER

## 2022-03-01 RX ORDER — PREGABALIN 225 MG/1
225 CAPSULE ORAL 2 TIMES DAILY
Qty: 60 CAPSULE | Refills: 1 | Status: SHIPPED | OUTPATIENT
Start: 2022-03-01 | End: 2022-04-15 | Stop reason: SDUPTHER

## 2022-03-01 RX ORDER — TRIAMCINOLONE ACETONIDE 5 MG/G
CREAM TOPICAL 2 TIMES DAILY
COMMUNITY
Start: 2021-11-02

## 2022-03-01 RX ORDER — PHENTERMINE HYDROCHLORIDE 37.5 MG/1
37.5 TABLET ORAL DAILY
COMMUNITY
Start: 2022-01-25

## 2022-03-01 NOTE — TELEPHONE ENCOUNTER
Dr Josiah Bang states that it is ok for patient to take a medrol dose ronnie but she needs to monitor he blood sugar levels closely. Rx pended.

## 2022-03-01 NOTE — TELEPHONE ENCOUNTER
Patient contacted and told that the MDP was sent to the pharmacy and to make sure she monitors her blood sugar levels closely.

## 2022-03-01 NOTE — TELEPHONE ENCOUNTER
Dr Brie Duran would like to put this patient on a MDP and needs the ok from Dr Man Carrera. Left message with Kennis Lesch and they will let us know.

## 2022-03-01 NOTE — LETTER
3/1/2022    Patient: Wilber Lyle   YOB: 1960   Date of Visit: 3/1/2022     Yuli Hernandez MD  [address]  [address] [facility] [place] [id number]  Via Fax: [phone]    Dear Yuli Hernandez MD,      Thank you for referring Ms. Wilber Lyle to [address] for evaluation. My notes for this consultation are attached. If you have questions, please do not hesitate to call me. I look forward to following your patient along with you.       Sincerely,    Jackie Licea MD

## 2022-03-18 PROBLEM — M51.369 DDD (DEGENERATIVE DISC DISEASE), LUMBAR: Status: ACTIVE | Noted: 2017-02-27

## 2022-03-18 PROBLEM — M51.36 DDD (DEGENERATIVE DISC DISEASE), LUMBAR: Status: ACTIVE | Noted: 2017-02-27

## 2022-03-19 PROBLEM — M51.26 LUMBAR HERNIATED DISC: Status: ACTIVE | Noted: 2017-02-27

## 2022-03-19 PROBLEM — M51.369 OTHER INTERVERTEBRAL DISC DEGENERATION, LUMBAR REGION: Status: ACTIVE | Noted: 2017-03-29

## 2022-03-19 PROBLEM — M48.061 SPINAL STENOSIS, LUMBAR: Status: ACTIVE | Noted: 2017-02-27

## 2022-03-19 PROBLEM — M51.36 OTHER INTERVERTEBRAL DISC DEGENERATION, LUMBAR REGION: Status: ACTIVE | Noted: 2017-03-29

## 2022-03-19 PROBLEM — M51.26 OTHER INTERVERTEBRAL DISC DISPLACEMENT, LUMBAR REGION: Status: ACTIVE | Noted: 2017-03-29

## 2022-03-24 ENCOUNTER — HOSPITAL ENCOUNTER (OUTPATIENT)
Dept: PHYSICAL THERAPY | Age: 62
Discharge: HOME | End: 2022-03-24
Payer: COMMERCIAL

## 2022-03-24 PROCEDURE — 97162 PT EVAL MOD COMPLEX 30 MIN: CPT

## 2022-03-24 PROCEDURE — 97110 THERAPEUTIC EXERCISES: CPT

## 2022-03-24 NOTE — PROGRESS NOTES
PHYSICAL THERAPY - DAILY TREATMENT NOTE    Patient Name: Tiera Vega        Date: 3/24/2022  : 1960   YES Patient  Verified  Visit #:   1   of     Insurance: Payor: Sally Mix / Plan: [facility] PPO / Product Type: PPO /      In time: 2:50 Out time: 3:30   Total Treatment Time: 40     Medicare Time Tracking (below)   Total Timed Codes (min):  NA 1:1 Treatment Time:  NA     TREATMENT AREA =  Cervical DDD    SUBJECTIVE    Pain Level (on 0 to 10 scale):  4  / 10   Medication Changes/New allergies or changes in medical history, any new surgeries or procedures? NO    If yes, update Summary List   Subjective Functional Status/Changes:  []  No changes reported     States she was [place] in 10[place]2021. States she fell and hit her head on a concrete step. States she felt pain in neck and right shoulder ~ 3 days later. States tingling in her right shoulder began in January. She the tingling is intermittent and changes locations. States the tingling varies from right ear to right upper arm. States the pain is intermittent also. She feels the neck pain is more muscular. States she sometimes wakes with a headache, but states stretching alleviates the headache. She denies any numbness. States x rays were taken. States she went to an Urgent Care after the fall. States she was diagnosed with whiplash and concussion. She has had cervical radiculopathy since .  States she had a cervical epidural which relieved with an epidural.       Subjective:          Previous Treatment:     PMHx:see chart    Social/Recreational/Work: retired RN    Pt Goals: see POC    Objective:    Posture: mild forward head and rounded shoulders    Palpation/Sensation: no significant increased muscle turgor noted in B cervical paraspinals    (N - normal; R - reduced; MR - markedly reduced)       C/S ROM     Range   Effect  Strength (MMT)          Right        Left    Flex 40 deg P! 0/10 Upper Trap (C2,3,4) 5 5   Ext 55 deg P! 3/10 Shld IR (C5,6) 5 5   R-lat flex 40 deg P! 3/10 Shld ER (C5) 3 3   L-lat flex 45 deg P! 3/10 Wrist flex (5C7) 5 5   R-rot 55 deg P! 4/10 Wrist ext (C6) 5 5   L-rot 55 deg P! 0/10 Wrist Ulnar Dev. (C8) 5 5      Deltiod (C5,6) 4- 5     Strength (MMT)       Right     Left        Special Tests            Right     Left  Biceps (C5,6) 5 5 Cervical Compression      Triceps (C7) 5 5 Cervical Traction     Thenar Ext (C8)   Spurlings     Intrinsics (T1) 5 5 ULTT                      @ setting#          OBJECTIVE    8 min Therapeutic Exercise:  [x]  See flow sheet   Rationale:      increase ROM and decrease radicular symptoms to improve the patients ability to perform ADL's and functional mobility with decreased pain/symptoms. min Patient Education:  YES  Reviewed HEP   []  Progressed/Changed HEP based on:   Issued supine or seated cervical retraction per handout     Other Objective/Functional Measures:    See eval    Cervical protraction reproduces tingling in sitting. Cervical retraction in sitting has NE on tingling. Supine cervical retraction NE.       Post Treatment Pain Level (on 0 to 10) scale:   3  / 10     ASSESSMENT  Assessment/Changes in Function:     Justification for Eval Code Complexity:  Patient History (low 0, mod 1-2, high 3-4): high (arthritis, DM, HTN)  Examination (low 1-2, mod 3+, high 4+): mod (see above)  Clinical Presentation (low stable or uncomplicated, mod evolving or changing, high unstable or unpredictable): mod  Clinical Decision Making (low , mod 26-74, high 1-25): FOTO = 52/100     []  See Progress Note/Recertification   Patient will continue to benefit from skilled PT services to modify and progress therapeutic interventions, address functional mobility deficits, address ROM deficits, address strength deficits, analyze and address soft tissue restrictions, analyze and cue movement patterns, analyze and modify body mechanics/ergonomics, assess and modify postural abnormalities and instruct in home and community integration to attain remaining goals. Progress toward goals / Updated goals:    Goals established.       PLAN    [x]  Upgrade activities as tolerated YES Continue plan of care   []  Discharge due to :    []  Other:      Therapist: Singh Romero PT    Date: 3/24/2022 Time: 2:53 PM     Future Appointments   Date Time Provider Kerri Aquino   4/15/2022 11:00 AM Velasquez Henning MD [facility]

## 2022-03-24 NOTE — PROGRESS NOTES
[facility]  [address], [address] [facility] [place]  Ph [phone]  Fx [phone]    Plan of Care/ Statement of Necessity for Physical Therapy Services    Patient name: Idania Barney Start of Care: 3/24/2022   Referral source: Edwin Jaquez MD : 1960    Medical Diagnosis: Cervicalgia [M54.2]   Onset Date:10/2021    Treatment Diagnosis: Cervical DDD, cervical neuritis, neck pain                                              ICD-10: M50.30   Prior Hospitalization: see medical history Provider#: [id number]   Medications: Verified on Patient summary List    Comorbidities: arthritis, DM, HTN   Prior Level of Function: independent       The Plan of Care and following information is based on the information from the initial evaluation. Assessment/ key information: Patient is a 63 y/o female with chief complaint of intermittent neck and right shoulder pain with intermittent tingling into right UE. Patient states she fell while hiking in 2021 and hit her head on a concrete step. States she went to an Urgent Care facility where x rays were taken to rule out any fractures. States she was diagnosed with whiplash and concussion. States she has a history of cervical radiculopathy and feels that the fall aggravated that. She reports waking with headaches sometimes but stretches alleviate the headache. Objective Findings: 1) decreased and painful cervical AROM, 2) decreased strength in B UE (most noticeable in right deltoids (C5 myotome)), 3) tingling symptoms can be reproduced with cervical protraction, suggesting a mechanical origin, 4) FOTO (Focused on Therapeutic Outcomes) functional status score of 52/100, indicating a 48% functional limitation. Patient will benefit from skilled physical therapy to address these issues.     Evaluation Complexity History HIGH Complexity :3+ comorbidities / personal factors will impact the outcome/ POC ; Examination MEDIUM Complexity : 3 Standardized tests and measures addressing body structure, function, activity limitation and / or participation in recreation  ;Presentation MEDIUM Complexity : Evolving with changing characteristics  ; Clinical Decision Making MEDIUM Complexity : FOTO score of 26-74 : FOTO score = an established functional score where 100 = no disability  Overall Complexity Rating: MEDIUM    Problem List: pain affecting function, decrease ROM, decrease strength, decrease ADL/ functional abilitiies, decrease activity tolerance, decrease flexibility/ joint mobility and decrease transfer abilities   Treatment Plan may include any combination of the following: Therapeutic exercise, Therapeutic activities, Neuromuscular re-education, Physical agent/modality, Manual therapy and Patient education  Vasopnuematic compression justification:  Per bilateral girth measures taken and listed above the edema is considered significant and having an impact on the patient's strength, self care and ADLs  Patient / Family readiness to learn indicated by: asking questions, trying to perform skills and interest  Persons(s) to be included in education: patient (P)  Barriers to Learning/Limitations: None  Measures taken if barriers to learning: NA  Patient Goal (s): \"decreased to no pain; no tingling  Patient Self Reported Health Status: good  Rehabilitation Potential: good    Short Term Goals: To be accomplished in 1.5 weeks:   1. Establish HEP. 2. Patient will report compliance with HEP. Long Term Goals: To be accomplished in 3 weeks:   1. Patient will score > 64/100 on FOTO to indicate improved tolerance with functional activities. 2. Patient will demonstrate strength of > 4+/5 in right deltoid to improve independence and safety with ADL's and functional mobility. 3.  Patient will demonstrate AROM cervical right rotation with pain < 1/10 to allow for greater ease and safety with driving. 4. Patient will be independent with HEP. Frequency / Duration: Patient to be seen 2-3 times per week for 2-3 weeks. Patient/ Caregiver education and instruction: Diagnosis, prognosis, self care and exercises   [x]  Plan of care has been reviewed with PTA    Certification Period: KELLY Willis, PT 3/24/2022 3:48 PM    ________________________________________________________________________    I certify that the above Therapy Services are being furnished while the patient is under my care. I agree with the treatment plan and certify that this therapy is necessary.     Physician's Signature:_____________________Date:____________TIME:________                                      Camelia Mckee MD      ** Signature, Date and Time must be completed for valid certification **  Please sign and return to [facility]  [address], [address] [facility] [address]  Ph [phone]  Fx [phone]

## 2022-03-30 ENCOUNTER — HOSPITAL ENCOUNTER (OUTPATIENT)
Dept: PHYSICAL THERAPY | Age: 62
Discharge: HOME | End: 2022-03-30
Payer: COMMERCIAL

## 2022-03-30 PROCEDURE — 97110 THERAPEUTIC EXERCISES: CPT

## 2022-03-30 PROCEDURE — 97530 THERAPEUTIC ACTIVITIES: CPT

## 2022-03-30 NOTE — PROGRESS NOTES
PT DAILY TREATMENT NOTE    Patient Name: Demetrice Saeed  Date: 3/30/2022  : 1960  [x]  Patient  Verified  Payor: Maggie Samayoa / Plan: [employer] [id number] PPO / Product Type: PPO /    In Time: 8:50  Out Time: 9:36  Total Treatment Time (min): 46  Total Timed Codes (min): 46  1:1 Treatment Time ( only): 43   Visit #: 2    Treatment Dx: Cervicalgia [M54.2]    SUBJECTIVE   Pre-Treatment Pain Level (0-10 scale): 2    Any medication changes, allergies to medications, adverse drug reactions, diagnosis change, or new procedure performed?: [x] No    [] Yes (see summary sheet for update)    Subjective Functional Status/Changes:  [] No changes reported  Patient states she noticed tingling down her right shoulder when she rotated her head to the left while driving the other day. \"But as soon as I felt it I did some of those chin tucks and that took away the tingling. \"    OBJECTIVE    35 min Therapeutic Exercise:  [x] See flow sheet   Rationale: increase ROM, increase strength, and decrease pain to assist in improving patient's ability to perform functional activities and ADLs    8 min Therapeutic Activity:  [x] See flow sheet   Rationale: increase ROM, increase strength, and improve coordination to assist in improving patient's ability to perform functional activities and ADLs      With   [x] TE   [] TA   [] neuro   [] other: Patient Education: [x] Review HEP    [] Progressed/Changed HEP based on:   [] positioning   [] body mechanics   [] transfers   [] heat/ice application    [] other:      Other Objective/Functional Measures: N/A     Pain Level (0-10 scale) Post Treatment: 0    ASSESSMENT/Changes in Function:  Patient responded well to today's treatment without any adverse reactions.   Patient did well with the initiation of further exercises today, which included: UBE (fwd and retro), standing rows/extensions with TAA, UT/LS stretches, sitting cervical retractions, supine cervical retractions, supine cervical rotations, and supine scap squeezes. Patient familiar with cervical retractions but did require cueing for slow release when coming out of the retraction. Also educated patient on posture, including why the need to work on strengthening her DNF and scapular stabilizers. Patient will continue to benefit from skilled PT services to further modify and progress therapeutic interventions, address functional mobility deficits, address ROM deficits, address strength deficits, analyze and address soft tissue restrictions, analyze and cue movement patterns, analyze and modify body mechanics/ergonomics, assess and modify postural abnormalities, and instruct in home and community integration to attain remaining goals. [x]  See Plan of Care  []  See Progress Note/Recertification  []  See Discharge Summary         Progress Towards Goals/Updated Goals:    Short Term Goals: To be accomplished in 1.5 weeks:               1.  Establish HEP. Current: HEP dispensed visit #1.  3/24/22, met                   2. Patient will report compliance with HEP. Current: Patient reports daily compliance with her HEP. Cueing needed today for correct form/performance as the exercises are still new to her. 3/30/22, in progress       Long Term Goals: To be accomplished in 3 weeks:               1.  Patient will score > 64/100 on FOTO to indicate improved tolerance with functional activities. Eval: 52/100                     2. Patient will demonstrate strength of > 4+/5 in right deltoid to improve independence and safety with ADL's and functional mobility. Eval: see chart below  C/S ROM     Range   Effect  Strength (MMT)          Right        Left    Flex 40 deg P! 0/10 Upper Trap (C2,3,4) 5 5   Ext 55 deg P! 3/10 Shld IR (C5,6) 5 5   R-lat flex 40 deg P! 3/10 Shld ER (C5) 3 3   L-lat flex 45 deg P! 3/10 Wrist flex (5C7) 5 5   R-rot 55 deg P! 4/10 Wrist ext (C6) 5 5   L-rot 55 deg P! 0/10 Wrist Ulnar Dev.  (C8) 5 5         Deltiod (C5,6) 4- 5                    3.  Patient will demonstrate AROM cervical right rotation with pain < 1/10 to allow for greater ease and safety with driving. Eval: see chart below  C/S ROM     Range   Effect  Strength (MMT)          Right        Left    Flex 40 deg P! 0/10 Upper Trap (C2,3,4) 5 5   Ext 55 deg P! 3/10 Shld IR (C5,6) 5 5   R-lat flex 40 deg P! 3/10 Shld ER (C5) 3 3   L-lat flex 45 deg P! 3/10 Wrist flex (5C7) 5 5   R-rot 55 deg P! 4/10 Wrist ext (C6) 5 5   L-rot 55 deg P! 0/10 Wrist Ulnar Dev. (C8) 5 5         Deltiod (C5,6) 4- 5                     4. Patient will be independent with HEP. PLAN  []  Upgrade Activities as Tolerated     [x]  Continue Plan of Care  []  Update Interventions per Flow Sheet       []  Discharge Due To:_  []  Other:_      Lucas Larson.  MINNIE Baeza, DPT, ATR  3/30/2022 7:52 AM    Future Appointments   Date Time Provider Kerri Aquino   3/30/2022  8:45 AM [facility]   3/31/2022 11:00 AM [place] [facility]   4/5/2022  2:45 PM THE [facility] PT RES CTR [facility]   4/8/2022 10:15 AM Aquilino Christianson, PT [facility]   4/14/2022 11:00 AM Lucas Baeza PT [facility]   4/15/2022 11:00 AM Siomara Henning MD [facility]

## 2022-03-31 ENCOUNTER — HOSPITAL ENCOUNTER (OUTPATIENT)
Dept: PHYSICAL THERAPY | Age: 62
Discharge: HOME | End: 2022-03-31
Payer: COMMERCIAL

## 2022-03-31 PROCEDURE — 97110 THERAPEUTIC EXERCISES: CPT

## 2022-03-31 PROCEDURE — 97530 THERAPEUTIC ACTIVITIES: CPT

## 2022-03-31 NOTE — PROGRESS NOTES
PT DAILY TREATMENT NOTE    Patient Name: Lissette Lanza  Date: 3/31/2022  : 1960  [x]  Patient  Verified  Payor: BLUE LG / Plan: [employer] [id number] PPO / Product Type: PPO /    In Time: 11:00  Out Time: 11:49  Total Treatment Time (min): 49  Total Timed Codes (min): 49  1:1 Treatment Time ( only): 37   Visit #: 3/9    Treatment Dx: Cervicalgia [M54.2]    SUBJECTIVE  Pre-Treatment Pain Level (0-10 scale): 3    Any medication changes, allergies to medications, adverse drug reactions, diagnosis change, or new procedure performed?: [x] No    [] Yes (see summary sheet for update)    Subjective Functional Status/Changes:  [] No changes reported  \"I woke up today with like an 8/10 neck pain, but I think that's because I used a lot of pillows last night. Once I got up and starting moving the pain is better, it's now a 3/10, and I haven't had any of the tingling. \"  Patient states she sleeps on her side and uses two pillows, including one placed under her arm. OBJECTIVE    29 min Therapeutic Exercise:  [x] See flow sheet   Rationale: increase ROM, increase strength, and decrease pain to assist in improving patient's ability to perform functional activities and ADLs    14 min Therapeutic Activity:  [x] See flow sheet   Rationale: increase ROM, increase strength, and improve coordination to assist in improving patient's ability to perform functional activities and ADLs      With   [x] TE   [] TA   [] neuro   [] other: Patient Education: [x] Review HEP    [] Progressed/Changed HEP based on:   [] positioning   [] body mechanics   [] transfers   [] heat/ice application    [] other:      Other Objective/Functional Measures: N/A     Pain Level (0-10 scale) Post Treatment: 0    ASSESSMENT/Changes in Function:  Patient responded well to today's treatment without any adverse reactions.   Introduced patient to further cervical stabilizations exercises via 6 way cervical isometrics, which she performed well without any c/o neck pain/tingling. Also initiated further scapular stabilization via \"no money\", shoulder adduction with band, shoulder ER with band exercises, and prone M and T's. Updated HEP was dispensed today, she also appears compliant with her initial HEP but does still need cueing throughout treatment for proper performance/form as she is still new to therapy. Patient will continue to benefit from skilled PT services to further modify and progress therapeutic interventions, address functional mobility deficits, address ROM deficits, address strength deficits, analyze and address soft tissue restrictions, analyze and cue movement patterns, analyze and modify body mechanics/ergonomics, assess and modify postural abnormalities, and instruct in home and community integration to attain remaining goals. [x]  See Plan of Care  []  See Progress Note/Recertification  []  See Discharge Summary         Progress Towards Goals/Updated Goals:    Short Term Goals: To be accomplished in 1.5 weeks:               1.  Establish HEP. Current: HEP dispensed visit #1. See STG #2 for compliance with HEP.  3/24/22, met                    2. Patient will report compliance with HEP. Current: Patient reports daily compliance with her HEP. Updated HEP given to her today as well as green/blue resistance bands. 3/31/22, in progress       Long Term Goals: To be accomplished in 3 weeks:               1.  Patient will score > 64/100 on FOTO to indicate improved tolerance with functional activities. Eval: 52/100    Current: same as eval                                           2. Patient will demonstrate strength of > 4+/5 in right deltoid to improve independence and safety with ADL's and functional mobility.                           Eval: see chart below  C/S ROM     Range   Effect  Strength (MMT)          Right        Left    Flex 40 deg P! 0/10 Upper Trap (C2,3,4) 5 5   Ext 55 deg P! 3/10 Shld IR (C5,6) 5 5   R-lat flex 40 deg P! 3/10 Shld ER (C5) 3 3   L-lat flex 45 deg P! 3/10 Wrist flex (5C7) 5 5   R-rot 55 deg P! 4/10 Wrist ext (C6) 5 5   L-rot 55 deg P! 0/10 Wrist Ulnar Dev. (C8) 5 5         Deltiod (C5,6) 4- 5        Current MMT: same as eval                   3.  Patient will demonstrate AROM cervical right rotation with pain < 1/10 to allow for greater ease and safety with driving. Eval: see chart below  C/S ROM     Range   Effect  Strength (MMT)          Right        Left    Flex 40 deg P! 0/10 Upper Trap (C2,3,4) 5 5   Ext 55 deg P! 3/10 Shld IR (C5,6) 5 5   R-lat flex 40 deg P! 3/10 Shld ER (C5) 3 3   L-lat flex 45 deg P! 3/10 Wrist flex (5C7) 5 5   R-rot 55 deg P! 4/10 Wrist ext (C6) 5 5   L-rot 55 deg P! 0/10 Wrist Ulnar Dev. (C8) 5 5         Deltiod (C5,6) 4- 5        Current Cervical AROM: same as eval                    4. Patient will be independent with HEP. PLAN  []  Upgrade Activities as Tolerated     [x]  Continue Plan of Care  []  Update Interventions per Flow Sheet       []  Discharge Due To:_  []  Other:_      Kimberly Macario.  Aryan, PT, DPT, ATR  3/31/2022 10:04 AM    Future Appointments   Date Time Provider Kerri Aquino   3/31/2022 11:00 AM Gaby Ovalle [facility]   4/5/2022  2:45 PM THE [facility] PT RES CTR [facility]   4/8/2022 10:15 AM Shasta Christianson, MINNIE [facility]   4/14/2022 11:00 AM Kimberly Baeza, PT [facility]   4/15/2022 11:00 AM Hanna Henning MD [facility]

## 2022-04-05 ENCOUNTER — HOSPITAL ENCOUNTER (OUTPATIENT)
Dept: PHYSICAL THERAPY | Age: 62
Discharge: HOME | End: 2022-04-05
Payer: COMMERCIAL

## 2022-04-05 PROCEDURE — 97530 THERAPEUTIC ACTIVITIES: CPT

## 2022-04-05 PROCEDURE — 97110 THERAPEUTIC EXERCISES: CPT

## 2022-04-05 NOTE — PROGRESS NOTES
PT DAILY TREATMENT NOTE    Patient Name: Chikis Urias  Date:2022  : 1960  [x]  Patient  Verified  Payor: BLUE CROSS / Plan: [facility] PPO / Product Type: PPO /    In time:2:50  Out time:3:35  Total Treatment Time (min): 45 min  Total Timed Codes (min): 45  1:1 Treatment Time (MC/BCBS only): 45   Visit #: 4 of 9     Treatment Dx: Cervicalgia [M54.2]    SUBJECTIVE  Pain Level (0-10 scale): 0/10  Any medication changes, allergies to medications, adverse drug reactions, diagnosis change, or new procedure performed?: [x] No    [] Yes (see summary sheet for update)  Subjective functional status/changes:   [] No changes reported  Pt reports intermittent tingling in week since last visit but has mainly been pain free. No longer reports discomfort with looking over her shoulder when driving. OBJECTIVE    Mild discomfort seen during performance of R rotation and cervical extension. UE shoulder flexion WNL. 35 min Therapeutic Exercise:  [x] See flow sheet :   Rationale: increase strength to improve the patients ability to perform functions of daily living. 10 min Therapeutic Activity:  []  See flow sheet :   Rationale: increase ROM and Reduce pain  to improve the patients ability to tolerate driving and work activities. With   [x] TE   [x] TA   [] neuro   [] other: Patient Education: [x] Review HEP    [x] Progressed/Changed HEP based on:   [x] positioning   [x] body mechanics   [] transfers   [] heat/ice application    [x] other: Pt comfort        Pain Level (0-10 scale) post treatment: 0/10    ASSESSMENT/Changes in Function: Patient tolerated treatment session very well today. Patient had no complaints with addition of UE supported seated cervical rotation and progression to cervical flx during supine cervical retractions to exercise program to accomplish improved motor patterning and decrease accessory use of upper trapezius.   Patient continues to make excellent progress toward goals and would benefit from continued skilled PT intervention to address remaining deficits outlined in goals below. Patient will continue to benefit from skilled PT services to analyze and cue movement patterns, analyze and modify body mechanics/ergonomics and assess and modify postural abnormalities to attain remaining goals. [x]  See Plan of Care  []  See progress note/recertification  []  See Discharge Summary         Progress towards goals / Updated goals:    Short Term Goals: To be accomplished in 1.5 weeks:               1.  Establish HEP. Current: HEP dispensed visit #1.  3/24/22, met                    2. Patient will report compliance with HEP. Current: Patient reports daily compliance with her HEP. Cueing needed today for correct form/performance as the exercises are still new to her. 4/5/22, in progress       Long Term Goals: To be accomplished in 3 weeks:               1.  Patient will score > 64/100 on FOTO to indicate improved tolerance with functional activities. Eval: 52/100                                           2. Patient will demonstrate strength of > 4+/5 in right deltoid to improve independence and safety with ADL's and functional mobility. Eval: see chart below  C/S ROM     Range   Effect  Strength (MMT)          Right        Left    Flex 40 deg P! 0/10 Upper Trap (C2,3,4) 5 5   Ext 55 deg P! 3/10 Shld IR (C5,6) 5 5   R-lat flex 40 deg P! 3/10 Shld ER (C5) 3 3   L-lat flex 45 deg P! 3/10 Wrist flex (5C7) 5 5   R-rot 55 deg P! 4/10 Wrist ext (C6) 5 5   L-rot 55 deg P! 0/10 Wrist Ulnar Dev. (C8) 5 5         Deltiod (C5,6) 4- 5                     3.  Patient will demonstrate AROM cervical right rotation with pain < 1/10 to allow for greater ease and safety with driving.                            Eval: see chart below   Current: met 4/5/22    C/S ROM [facility]   Effect  Strength (MMT)          Right        Left    Flex 40 deg P! 0/10 Upper Trap (C2,3,4) 5 5   Ext 55 deg P! 0/10 Shld IR (C5,6) 5 5   R-lat flex 40 deg P! 0/10 Shld ER (C5) 3 3   L-lat flex 45 deg P! 0/10 Wrist flex (5C7) 5 5   R-rot 55 deg P! 0/10 Wrist ext (C6) 5 5   L-rot 55 deg P! 0/10 Wrist Ulnar Dev. (C8) 5 5         Deltiod (C5,6) 4- 5                      4. Patient will be independent with HEP.         PLAN  []  Upgrade activities as tolerated     [x]  Continue plan of care  []  Update interventions per flow sheet       []  Discharge due to:_  []  Other:_      Cely Osman PT 4/5/2022  2:50 PM    Future Appointments   Date Time Provider Kerri Aquino   4/8/2022 10:15 AM Billy Mercedes, PT [facility]   4/14/2022 11:00 AM Juvenal Baeza, PT [facility]   4/15/2022 11:00 AM Chrissy Henning MD [facility]

## 2022-04-08 ENCOUNTER — HOSPITAL ENCOUNTER (OUTPATIENT)
Dept: PHYSICAL THERAPY | Age: 62
Discharge: HOME | End: 2022-04-08
Payer: COMMERCIAL

## 2022-04-08 PROCEDURE — 97530 THERAPEUTIC ACTIVITIES: CPT

## 2022-04-08 PROCEDURE — 97112 NEUROMUSCULAR REEDUCATION: CPT

## 2022-04-08 PROCEDURE — 97110 THERAPEUTIC EXERCISES: CPT

## 2022-04-08 NOTE — PROGRESS NOTES
PT DAILY TREATMENT NOTE    Patient Name: Benjie Garcia  Date:2022  : 1960  [x]  Patient  Verified  Payor: BLUE CROSS / Plan: [facility] PPO / Product Type: PPO /    In time:1020  Out time:1104  Total Treatment Time (min): 44  Total Timed Codes (min): 44  1:1 Treatment Time (MC/BCBS only): 44   Visit #: 5 of 9    Treatment Dx: Cervicalgia [M54.2]    SUBJECTIVE  Pain Level (0-10 scale): 6-7/10  Any medication changes, allergies to medications, adverse drug reactions, diagnosis change, or new procedure performed?: [x] No    [] Yes (see summary sheet for update)  Subjective functional status/changes:   [] No changes reported  Pt reports she had pain in inferior aspect of left scapula following last visit and has been at a consistent 6-7/10 since then. OBJECTIVE    15 min Therapeutic Exercise:  [x] See flow sheet :   Rationale: increase ROM and increase strength to improve the patients ability to restore PLOF     15 min Therapeutic Activity:  [x]  See flow sheet :   Rationale: increase strength and improve coordination  to improve the patients ability to complete transfers and ADLs without external assist      14 min Neuromuscular Re-education:  [x]  See flow sheet :   Rationale: improve coordination and increase proprioception  to improve the patients ability to activate scapular stabilizers without compensation     With   [x] TE   [x] TA   [x] neuro   [] other: Patient Education: [x] Review HEP    [] Progressed/Changed HEP based on:   [] positioning   [] body mechanics   [] transfers   [] heat/ice application    [] other:      Other Objective/Functional Measures: NA     Pain Level (0-10 scale) post treatment: 0    ASSESSMENT/Changes in Function: Patient tolerated treatment session well today. Patient had no complaints with addition of tband rows and ext and tband shoulder strengthening to exercise program to accomplish improved scapular stability and strength.  Patient reported improved inferior scapular pain following session. Patient continues to make good progress toward goals and would benefit from continued skilled PT intervention to address remaining deficits outlined in goals below. Patient will continue to benefit from skilled PT services to modify and progress therapeutic interventions, address functional mobility deficits, address ROM deficits, address strength deficits, analyze and address soft tissue restrictions, analyze and cue movement patterns, analyze and modify body mechanics/ergonomics and assess and modify postural abnormalities to attain remaining goals. [x]  See Plan of Care  []  See progress note/recertification  []  See Discharge Summary         Progress towards goals / Updated goals:  Short Term Goals: To be accomplished in 1.5 weeks:               1.  Establish HEP.                         KERGEPH: HEP dispensed visit #1.  3/24/22, met                    2. Patient will report compliance with HEP.                         LMZPUXX: Patient reports daily compliance with her HEP.   Cueing needed today for correct form/performance as the exercises are still new to her.  4/5/22, in progress       Long Term Goals: To be accomplished in 3 weeks:               1.  Patient will score > 64/100 on FOTO to indicate improved tolerance with functional activities.                         PHXN: 34/647                                           2. Patient will demonstrate strength of > 4+/5 in right deltoid to improve independence and safety with ADL's and functional mobility.                         Eval: see chart below   Strength (MMT)          Right        Left    Upper Trap (C2,3,4) 5 5   Shld IR (C5,6) 5 5   Shld ER (C5) 3 3   Wrist flex (5C7) 5 5   Wrist ext (C6) 5 5   Wrist Ulnar Dev.  (C8) 5 5   Deltiod (C5,6) 4- 5      Current:  Progressing well - requires cueing for proper form with activation of shoulder external rotators 4/8/22   Strength (MMT)          Right        Left    Upper Trap (C2,3,4) 5 5   Shld IR (C5,6) 5 5   Shld ER (C5) 4- 4-   Wrist flex (5C7) 5 5   Wrist ext (C6) 5 5   Wrist Ulnar Dev.  (C8) 5 5   Deltiod (C5,6) 4 5                    3.  Patient will demonstrate AROM cervical right rotation with pain < 1/10 to allow for greater ease and safety with driving.                           Eval: see chart below  C/S ROM     Range   Effect    Flex 40 deg P! 0/10   Ext 55 deg P! 0/10   R-lat flex 40 deg P! 0/10   L-lat flex 45 deg P! 0/10   R-rot 55 deg P! 0/10   L-rot 55 deg P! 0/10                              4. Patient will be independent with HEP.        PLAN  []  Upgrade activities as tolerated     [x]  Continue plan of care  []  Update interventions per flow sheet       []  Discharge due to:_  []  Other:_      Nicole Ewing, PT 4/8/2022  10:26 AM    Future Appointments   Date Time Provider Kerri Aquino   4/14/2022 11:00 AM Danitza Baeza [facility]   4/15/2022 11:00 AM Chrissy Henning MD [facility]

## 2022-04-13 ENCOUNTER — TELEPHONE (OUTPATIENT)
Dept: PHYSICAL THERAPY | Age: 62
End: 2022-04-13

## 2022-04-13 NOTE — PROGRESS NOTES
[facility]  [address], [address]  Phone: [phone]  Fax: [phone]        PROGRESS NOTE      HISTORY OF PRESENT ILLNESS:  The patient is a 64 y.o. female and was seen today for follow up of  progressive neck pain w/ RUE paresthesias into the elbow x 1 month. She reports a fall in 9/2021 with immediate onset of neck and right shoulder pain. She states she had presented to urgent care on 9/10/2021 following the fall and had noted improvement w/ treatment provided through urgent care. She had been doing well for several months but notes progression in pain 1 month ago with new onset of paresthesias into the upper RUE to the elbow, states it also radiates to the right side of her face and right ear. Her neck pain is aggravated with cervical rotation and bending to the right. Pt denies dropping things or loss of balance. She denies any additional falls. Previously seen for low back and bilateral hip pain radiating into the BLE (RLE>LLE) in a S1 distribution to the foot on the RLE. Previously, she was seen for low back and bilateral hip (R>L) pain. Previously, she was seen for low back pain. Previously, she was seen with c/o paraesthesias in her shoulder. Previously, she was seen for pain localized to the low back. She denied radicular symptoms at the time. She was previously seen with low back pain radiating into the BLE (L>R) in an S1 distribution to the foot. She reports her pain is a near-constant 9/10. She describes symptoms consistent with spinal stenosis. Her pain was previously low back pain radiating into the BLE in an S1 distribution to the mid thigh. She was previously seen with low back pain into the BLE extending in an S1 distribution to the knees. Pain is exacerbated by standing and ambulating. Per patient, she has a torn meniscus of the right knee which could possibly be contributing to her low back pain.  Pt reports her baseline level of pain at 4/10. The patient additionally had complaints of neck pain. Pt underwent L3/4 epidural from 1/31/17 reporting some relief but notes prior block offered better relief. Pt underwent L3/4 epidural on 7/25/17 and reported she was symptom-free. Pt underwent an L3/4 epidural on 9/4/18 with no relief at this time. Pt underwent epidural L3-4 on 11/10/2020 with relief. She previously received a knee injection from Dr. PLUMMERQU 3/5280 with relief. Pt reports recent left knee surgery with Dr. Dia Larson for a torn meniscus. Pt failed NEURONTIN and TOPAMAX. Pt previously tolerated the increased dose of Lyrica to 225 mg BID with no additional benefit. Pt is compliant with Cymbalta 30 mg per day. She reports rare use of Tramadol. Pt takes baby Aspirin. Pt was prescribed Klonopin for insomnia but states she has discontinued use. Pt is completing her HEP daily. Pt dx with vertigo, 1/2021. She has hx of DM and reports monitoring her blood sugars 3x/day. The patient has a history of DM and reports blood sugars are well controlled, consistently remaining below 200. DM [facility] ([phone][facility] are well [facility]. She denies hx of glaucoma. At that time, patient was doing well. Note from [facility], NP dated 6/27/18 indicating patient was seen with c/o low back and BLE pain, most severe in the bilateral buttocks. Patient decreased her Cymbalta to 30 mg daily, and did well for a while. She went on vacation and noted an increase in pain following this, so she increased her Cymbalta back to 60 mg daily. She was scheduled to f/u in 6 month's time. Her pain at that time was 4-8/10 with no specific injury or trauma. She was treated with MDP at this time and continued on Cymbalta. Pt is tolerating the increased dose of Lyrica 150 mg BID with some benefit. She had initial dizziness but notes improvement.  She continues on Cymbalta 60 mg daily. Upper extremity EMG per report revealed evidence of moderately severe carpal tunnel syndrome bilaterally, worse on the right. Cervical spine MRI per report revealed degenerative disc disease at multiple levels with foraminal encroachment and uncovertebral joint spurring. L spine MRI dated 4/21/2021 was reviewed and demonstrated L3-4: Facet arthropathy anterolisthesis and medium size broad disc bulge combine to cause severe central stenosis with impingement of both lateral recesses. There is moderate right neural foraminal narrowing with impingement of the exiting nerve root. Mild to moderate left neural foraminal narrowing. L4-5: Facet arthropathy. Large broad disc bulge with superimposed central protrusion results in extremely severe central stenosis with impingement of both lateral recesses. There is mild to moderate right and moderate left neural foraminal narrowing. 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 Cerivcal spine plain films dated 3/1/2022. 2 views: AP and lateral. Revealed: Moderately severe disc space narrowing at C3-4 and C5-6. Mild disc space narrowing at C6-7. Moderate disc space narrowing at C7-T1. No acute pathology identified. At her last clinical appointment, I started her on Medrol Dosepak, pending approval from Dr. Meme Medrano  ([phone]) secondary to DM. NSAIDs deferred secondary to taking Aspirin. I offered increasing her Lyrica, pt wished to proceed. I increased her Lyrica 150 mg BID to 225 mg BID. Patient was advised to call office if intolerant to higher dose. I provided her refills of Cymbalta 60 mg daily. I referred her to physical therapy for her neck with an emphasis on HEP. Pt should have continued with her vertigo PT at the time.        The patient returns today with right sided neck pain. She rates her pain 2/10, previously 0-7/10.  Her RUE paresthesias is aggravated with leaning her arm on an arm rest. She completed the MPD w/ benefit, denies any increase in blood sugars. She continues with neck PT w/ benefit. She is compliant with her HEP. She notes improvement in her RUE paresthesias. She is tolerating the Lyrica 225 mg BID w/ benefit. She is complaint with her Cymbalta 60 mg daily. Pt denies dropping things. She denies any progression in LOB, attributes it to her vertigo. Denies any falls.  reviewed. Body mass index is 37.24 kg/m². PCP: Sergey Gatica MD      Past Medical History:   Diagnosis Date    Arthritis     Cervical radiculopathy     Diabetes ([facility] [address]     Hypercholesteremia     Hypertension         Social History     Socioeconomic History    Marital status:      Spouse name: Not on file    Number of children: Not on file    Years of education: Not on file    Highest education level: Not on file   Occupational History    Not on file   Tobacco Use    Smoking status: Never Smoker    Smokeless tobacco: Never Used   Vaping Use    Vaping Use: Not on file   Substance and Sexual Activity    Alcohol use: Yes     Alcohol/week: 0.8 standard drinks     Types: 1 Glasses of wine per week    Drug use: No    Sexual activity: Not on file   Other Topics Concern    Not on file   Social History Narrative    Not on file     Social Determinants of Health     Financial Resource Strain:     Difficulty of Paying Living Expenses: Not on file   Food Insecurity:     Worried About Running Out of Food in the Last Year: Not on file    Sage of Food in the Last Year: Not on file   Transportation Needs:     Lack of Transportation (Medical): Not on file    Lack of Transportation (Non-Medical):  Not on file   Physical Activity:     Days of Exercise per Week: Not on file    Minutes of Exercise per Session: Not on file   Stress:     Feeling of Stress : Not on file   Social Connections:     Frequency of Communication with Friends and Family: Not on file    Frequency of Social Gatherings with Friends and Family: Not on file    Attends Adventism Services: Not on file    Active Member of Clubs or Organizations: Not on file    Attends Club or Organization Meetings: Not on file    Marital Status: Not on file   Intimate Partner Violence:     Fear of Current or Ex-Partner: Not on file    Emotionally Abused: Not on file    Physically Abused: Not on file    Sexually Abused: Not on file   Housing Stability:     Unable to Pay for Housing in the Last Year: Not on file    Number of [place] in the Last Year: Not on file    Unstable Housing in the Last Year: Not on file       Current Outpatient Medications   Medication Sig Dispense Refill    ferrous sulfate (Slow Fe) 142 mg (45 mg iron) ER tablet Take 1 Tablet by mouth daily.  insulin glargine (Basaglar KwikPen U-100 Insulin) 100 unit/mL (3 mL) inpn 30 Units by SubCUTAneous route.  phentermine (ADIPEX-P) 37.5 mg tablet Take 37.5 mg by mouth daily.  triamcinolone (ARISTOCORT) 0.5 % topical cream two (2) times a day.  DULoxetine (CYMBALTA) 60 mg capsule Take 1 Capsule by mouth nightly. 90 Capsule 1    pregabalin (LYRICA) 225 mg capsule Take 1 Capsule by mouth two (2) times a day. Max Daily Amount: 450 mg. 60 Capsule 1    glucose blood VI test strips (ASCENSIA AUTODISC VI, ONE TOUCH ULTRA TEST VI) strip 50 Each.  Blood-Glucose Meter misc 1 Each.  ibuprofen (MOTRIN) 600 mg tablet Take 600 mg by mouth.  HUMALOG KWIKPEN INSULIN 100 unit/mL kwikpen INJECT 6 TO 8 UNITS SUBCUTANEOUSLY THREE TIMES DAILY BEFORE MEALS      ketorolac (ACULAR) 0.5 % ophthalmic solution INSTILL 1 DROP INTO RIGHT EYE THREE TIMES DAILY      lancets (ONE TOUCH DELICA) 33 gauge misc USE TO CHECK BLOOD SUGAR THREE TIMES A DAY      ONETOUCH DELICA PLUS LANCET 33 gauge misc USE 1 TO CHECK GLUCOSE THREE TIMES DAILY      losartan-hydroCHLOROthiazide (HYZAAR) 100-25 mg per tablet Take 1 Tab by mouth.       magnesium oxide (MAG-OX) 400 mg tablet Take 400 mg by mouth.  Insulin Needles, Disposable, (EVAN PEN NEEDLE) 32 gauge x 5/32\" ndle USE TO INJECT INSULIN FOUR TIMES A DAY      aspirin delayed-release 81 mg tablet Take  by mouth daily.  multivitamin (ONE A DAY) tablet Take 1 Tab by mouth daily.  cholecalciferol (VITAMIN D3) 1,000 unit cap Take  by mouth daily.  CRESTOR 20 mg tablet       metFORMIN (GLUCOPHAGE) 1,000 mg tablet Take 1,000 mg by mouth two (2) times daily (with meals).  ACETAMINOPHEN (TYLENOL EXTRA STRENGTH PO) Take  by mouth.  methylPREDNISolone (MEDROL DOSEPACK) 4 mg tablet Per dose pack instructions 1 Dose Pack 0       Allergies   Allergen Reactions    Actos [Pioglitazone] Unable to Obtain    Daypro [Oxaprozin] Unknown (comments)    Lisinopril Unable to Obtain    Other Medication Unable to Obtain     Other allergies are:  Antiinflammatories  Algesics  And several diabetic medications    Pioglitazone-Metformin Swelling    Topamax [Topiramate] Other (comments)     Unknown reaction    Zocor [Simvastatin] Myalgia          PHYSICAL EXAMINATION    Visit Vitals  Pulse 65   Temp 97.6 °F (36.4 °C) (Temporal)   Ht 5' 7\" (1.702 m)   Wt 237 lb 12.8 oz (107.9 kg)   SpO2 (!) 88%   BMI 37.24 kg/m²       CONSTITUTIONAL: NAD, A&O x 3  SENSATION: Intact to light touch throughout  NEURO: Long's is positive on the left   RANGE OF MOTION: The patient has full passive range of motion in all four extremities. MOTOR:  Tandem gait: LOB     Shoulder AB/Flex Elbow Flex Wrist Ext Elbow Ext Wrist Flex Hand Intrin Tone   Right +4/5 +4/5 +4/5 +4/5 +4/5 +4/5 +4/5   Left +4/5 +4/5 +4/5 +4/5 +4/5 +4/5 +4/5               ASSESSMENT   Diagnoses and all orders for this visit:    1. Neck pain    2. Spinal stenosis of lumbar region without neurogenic claudication    3. DDD (degenerative disc disease), cervical    4. Cervical neuritis    5.  Test anxiety      IMPRESSION AND PLAN:  Patient returns to the office today with c/o right sided neck pain. Multiple treatment options were discussed. I will provide her with refills of Lyrica 225 mg BID. She did not require refills of Cymbalta 60 mg daily  I encouraged her to continue to perform her daily HEP. Patient is neurologically intact. I will see the patient back in 3 month's time or earlier if needed. Written by Nikolas Reddy, as dictated by Twyla Lemon MD  I examined the patient, reviewed and agree with the note.

## 2022-04-14 ENCOUNTER — APPOINTMENT (OUTPATIENT)
Dept: PHYSICAL THERAPY | Age: 62
End: 2022-04-14
Payer: COMMERCIAL

## 2022-04-15 ENCOUNTER — OFFICE VISIT (OUTPATIENT)
Dept: ORTHOPEDIC SURGERY | Age: 62
End: 2022-04-15
Payer: COMMERCIAL

## 2022-04-15 VITALS
HEART RATE: 65 BPM | OXYGEN SATURATION: 88 % | HEIGHT: 67 IN | BODY MASS INDEX: 37.32 KG/M2 | TEMPERATURE: 97.6 F | WEIGHT: 237.8 LBS

## 2022-04-15 DIAGNOSIS — M50.30 DDD (DEGENERATIVE DISC DISEASE), CERVICAL: ICD-10-CM

## 2022-04-15 DIAGNOSIS — M54.12 CERVICAL NEURITIS: ICD-10-CM

## 2022-04-15 DIAGNOSIS — M48.061 SPINAL STENOSIS OF LUMBAR REGION WITHOUT NEUROGENIC CLAUDICATION: ICD-10-CM

## 2022-04-15 DIAGNOSIS — M51.36 DDD (DEGENERATIVE DISC DISEASE), LUMBAR: ICD-10-CM

## 2022-04-15 DIAGNOSIS — M54.2 NECK PAIN: Primary | ICD-10-CM

## 2022-04-15 DIAGNOSIS — F41.8 TEST ANXIETY: ICD-10-CM

## 2022-04-15 PROCEDURE — 99213 OFFICE O/P EST LOW 20 MIN: CPT | Performed by: PHYSICAL MEDICINE & REHABILITATION

## 2022-04-15 RX ORDER — PREGABALIN 225 MG/1
225 CAPSULE ORAL 2 TIMES DAILY
Qty: 180 CAPSULE | Refills: 0 | Status: SHIPPED | OUTPATIENT
Start: 2022-04-15 | End: 2022-08-09 | Stop reason: SDUPTHER

## 2022-04-15 RX ORDER — DULOXETIN HYDROCHLORIDE 60 MG/1
60 CAPSULE, DELAYED RELEASE ORAL
Qty: 90 CAPSULE | Refills: 1 | Status: CANCELLED | OUTPATIENT
Start: 2022-04-15

## 2022-04-15 NOTE — LETTER
4/15/2022    Patient: Ravinder Quintana   YOB: 1960   Date of Visit: 4/15/2022     Senait Watt MD  [address] [place] [id number]  Via Fax: [phone]    Dear Senait Watt MD,      Thank you for referring Ms. Ravinder Quintana to [address] for evaluation. My notes for this consultation are attached. If you have questions, please do not hesitate to call me. I look forward to following your patient along with you.       Sincerely,    Guzman Jones MD

## 2022-04-25 ENCOUNTER — TELEPHONE (OUTPATIENT)
Dept: PHYSICAL THERAPY | Age: 62
End: 2022-04-25

## 2022-05-04 DIAGNOSIS — M51.36 DDD (DEGENERATIVE DISC DISEASE), LUMBAR: ICD-10-CM

## 2022-05-04 DIAGNOSIS — M54.12 CERVICAL NEURITIS: ICD-10-CM

## 2022-05-04 DIAGNOSIS — M50.30 DDD (DEGENERATIVE DISC DISEASE), CERVICAL: ICD-10-CM

## 2022-05-04 DIAGNOSIS — M48.061 SPINAL STENOSIS OF LUMBAR REGION WITHOUT NEUROGENIC CLAUDICATION: ICD-10-CM

## 2022-05-04 RX ORDER — PREGABALIN 225 MG/1
225 CAPSULE ORAL 2 TIMES DAILY
Qty: 180 CAPSULE | Refills: 0 | OUTPATIENT
Start: 2022-05-04

## 2022-05-23 NOTE — PROGRESS NOTES
[facility]  [address], [address] [facility]  Ph [phone]  Fx [phone]    Physical Therapy Discharge Summary    Patient name: Chikis Urias Start of Care: 3/24/2022   Referral source: Kranthi Gomes MD : 1960                Medical Diagnosis: Cervicalgia [M54.2]    Onset Date:10/2021                Treatment Diagnosis: Cervical DDD, cervical neuritis, neck pain                                              ICD-10: M50.30   Prior Hospitalization: see medical history Provider#: [id number]   Medications: Verified on Patient summary List    Comorbidities: arthritis, DM, HTN   Prior Level of Function: independent       Visits from Start of Care: 5    Missed Visits: 0    Reporting Period : 3/24/22 to 22    Goals/Measure of Progress:  Short Term Goals: To be accomplished in 1.5 weeks:               1.  Establish HEP.                         IAFBDVN: HEP dispensed visit #1.  3/24/22, met                    2. Patient will report compliance with HEP.                         GYJKOQE: Patient reports daily compliance with her HEP.   Cueing needed today for correct form/performance as the exercises are still new to her.  22, in progress       Long Term Goals: To be accomplished in 3 weeks:               1.  Patient will score > 64/100 on FOTO to indicate improved tolerance with functional activities.                         FWKZ:                                            8. Patient will demonstrate strength of > 4+/5 in right deltoid to improve independence and safety with ADL's and functional mobility.                         Eval: see chart below   Strength (MMT)          Right        Left    Upper Trap (C2,3,4) 5 5   Shld IR (C5,6) 5 5   Shld ER (C5) 3 3   Wrist flex (5C7) 5 5   Wrist ext (C6) 5 5   Wrist Ulnar Dev.  (C8) 5 5   Deltiod (C5,6) 4- 5      Current:  Progressing well - requires cueing for proper form with activation of shoulder external rotators 4/8/22   Strength (MMT)          Right        Left    Upper Trap (C2,3,4) 5 5   Shld IR (C5,6) 5 5   Shld ER (C5) 4- 4-   Wrist flex (5C7) 5 5   Wrist ext (C6) 5 5   Wrist Ulnar Dev. (C8) 5 5   Deltiod (C5,6) 4 5                     3.  Patient will demonstrate AROM cervical right rotation with pain < 1/10 to allow for greater ease and safety with driving.                           Eval: see chart below  C/S ROM     Range   Effect    Flex 40 deg P! 0/10   Ext 55 deg P! 0/10   R-lat flex 40 deg P! 0/10   L-lat flex 45 deg P! 0/10   R-rot 55 deg P! 0/10   L-rot 55 deg P! 0/10                              4. Patient will be independent with HEP. Assessment/ Summary of Care: Patient is a 63 y/o female with chief complaint of intermittent neck and right shoulder pain with intermittent tingling into right UE. Patient states she fell while hiking in October 2021 and hit her head on a concrete step. States she went to an Urgent Care facility where x rays were taken to rule out any fractures. States she was diagnosed with whiplash and concussion. States she has a history of cervical radiculopathy and feels that the fall aggravated that. She reports waking with headaches sometimes but stretches alleviate the headache. Pt attended 5 therapy session and was progressing towards functional goals. Unable to formally assess goals at this time as pt failed to show for scheduled followup appts. Please see above for goals assessment while pt was current under the care of this clinic. Please DC to HEP at this time. Thank you for this referral. Pt will require new order if he/she requires further rehab services.       RECOMMENDATIONS:  [x]Discontinue therapy: []Patient has reached or is progressing toward set goals      [x]Patient is non-compliant or has abdicated      []Due to lack of appreciable progress towards set goals    Fatuma Mckinney, PT 5/23/2022 1:18 PM

## 2022-05-27 ENCOUNTER — HOSPITAL ENCOUNTER (OUTPATIENT)
Dept: PHYSICAL THERAPY | Age: 62
Discharge: HOME | End: 2022-05-27
Payer: COMMERCIAL

## 2022-05-27 PROCEDURE — 97161 PT EVAL LOW COMPLEX 20 MIN: CPT

## 2022-05-27 NOTE — PROGRESS NOTES
PHYSICAL THERAPY EVALUATION / DAILY TREATMENT      Patient Name: Kalie Mosquera  Date: 2022  : 1960  [x] Patient  Verified  Payor: Marie Orellana / Plan: Maynor Lay [id number] PPO / Product Type: PPO /    In Time: 12:30  Out Time: 1:05  Total Treatment Time (min): 35  Visit #: 1    Medicare / [address] Only   Total Timed Codes (min):  0 1:1 Treatment Time:  35     Treatment Area: Pain in left lower leg [M79.662]    SUBJECTIVE:  Chief Complaint/Mechanism of Injury:   Patient is a very pleasant 64 y.o. female with c/o left ankle pain that began after she fell last year and describes inverting her ankle. Patient states her doctor initially thought there was a hairline fracture, so he placed her in a boot (February last year). Patient states her pain went completely away, but they started back up a month ago when she mistepped on a small step. Patient states since then her pain has been worsening and occurs from her left lateral malleolus shooting do into her left foot along the 5th metatarsal region. Patient states her pain is increased with active eversion as well as difficulty/pain with standing and walking > a few minutes. Patient denies any difficulty with stair negotiations for her left ankle, but reports left knee pain during stair negotiations. Patient states she is able to negotiate a full flight of stairs with a step-through pattern. Patient also reports decreased balance since she re-injured her ankle and reports as though she feels her ankle is unstable. Patient states her pain has been slightly less since receiving an ASO brace about 2 weeks, she is wearing this full-time other than sleeping. 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 Her hobbies include painting, writing, hiking, walking, and riding her bike. Patient states she is still riding her bike, but she hasn't been able to do her usual 3 mile walks several times/week secondary to left ankle pain.   She would like to get back to her weekly walking routine. Of note, patient states she leaves 7/1/22 for a 5-week long trip out of the country. Pain Level (out of 0-10 scale): pain ranges from 2-8; currently 4  [x] constant, [] intermittent, [] improving, [x] worsening, [] no change since onset  Alleviating Factors: icing, wearing ASO brace, elevation  Previous Treatment for Current Injury: boot in February of 2021  Diagnostic Imaging for Current Injury: x-rays  Living Situation: lives with sister in a ranch with 4 LUPIS with bilateral handrail  Hand Dominance: [x] right handed, [] left handed, [] ambidextrous    Comorbidities: left ankle injury one year ago, OA, HTN, neck pain, chronic LBP, DM  Substance Use: [] tobacco use, [x] alcohol use: socially, [] other:   Prior Level of Function:   Biking, walking 3 miles x 3-4x/week  [x] Unrestricted with functional activities and ADLs  [x] No assistive device  [x] active lifestyle, [] moderately active lifestyle, [] sedentary lifestyle  Patients Goals:  \"to decrease pain and increase my strength, basically fix my ankle\"    Potential Barriers for PT: [] pain, [] financial, [] time, [] transportation, [] other:  Motivation: Good  Cognition: A&O x 3  Denies Red Flags: SOB, chest pain, dizziness/lightheadedness, blurred/double vision, HA, chills/fevers, night sweats, change in bowel/bladder control, abdominal pain, difficulty swallowing, slurred speech, unexplained weight gain/loss, nausea, and/or vomiting.   Any medication changes, allergies to medications, adverse drug reactions, diagnosis change, or new procedure performed?: [x] No     [] Yes (see summary sheet for update)      OBJECTIVE/EXAMINATION:    35 min [x] Eval                  [] Re-Evaluation         With   [x] TE   [] TA   [] Neuro   [] Other: Patient Education: [x] Review HEP    [] Progressed/Changed HEP based on:   [] positioning   [] body mechanics   [] transfers   [] heat/ice application    [] other:      Physical Therapy Evaluation:    Inspection: wearing ASO brace and athletic shoes  [x] Patient in no apparent distress    Posture: poor posture with loss of cervical lordosis and forward shoulders    Gait: slightly antalgic during push off but otherwise WNL    Palpation: TTP styloid process of the 5th metatarsal,     AROM/PROM (degrees): Left Ankle: WNL except PF 40 deg active/48 deg passive    MMT: Grossly 4+/5 bilaterally except EV 4-/5    Special Tests: n/a    Other Comments: none     Pain Level (out of 0-10 scale) Post Treatment: 4      ASSESSMENT:  Patient is a very pleasant 64 y.o. female presenting to skilled PT with c/o left ankle pain that began after she fell last year and describes inverting her ankle. Patient states her doctor initially thought there was a hairline fracture, so he placed her in a boot (February last year). Patient states her pain went completely away, but they started back up a month ago when she mistepped on a small step. Patient states since then her pain has been worsening and occurs from her left lateral malleolus shooting do into her left foot along the 5th metatarsal region. Patient states her pain is increased with active eversion as well as difficulty/pain with standing and walking > a few minutes. Today she demonstrates decreased left ankle AROM as well as decreased left ankle strength, which are likely in contribution to the instability of her left ankle. Of note, patient states she leaves 7/1/22 for a 5-week long trip out of the country. Patient would benefit from skilled PT services to modify and progress therapeutic interventions, address functional mobility deficits, address ROM deficits, address strength deficits, analyze and address soft tissue restrictions, analyze and cue movement patterns, analyze and modify body mechanics/ergonomics, and assess and modify postural abnormalities to attain remaining goals. Patient did well with today's evaluation.   Patient was educated in [address] [facility] and all questions were answered. [x]  See Plan of Care  []  See Progress Note/Recertification  []  See Discharge Summary         GOALS:  Short Term Goals:  to be accomplished within 2 weeks:    Patient will report compliance with prescribed HEP(s) at least 1x/day in order to assist in maximizing therapeutic gains, reduce symptoms, and to progress towards overall prior function. Eval: HEP to be issued and reviewed with patient within 1-2 visits  Current: same as above      Long Term Goals:  to be accomplished within 5 weeks:    Patient will meet or exceed points of physical change on FOTO as deemed by FOTO tool in order to maximize function and promote patient satisfaction with overall outcome. (Ardyth Trivedi is an established functional score where 100 = no disability)  Eval: FOTO to be completed 2nd visit  Current: same as above    Patient will be painfree during all functional activities/ADLs in order to improve QOL and return to patient's PLOF. Eval: pain ranges from 2-8; currently 4  Current: same as above    Patient will demonstrate functional and painfree AROM in order to return to prior functional activities and mobility. Eval: Left Ankle: WNL except PF 40 deg active/48 deg passive  Current: same as above    Patient will demonstrate at least 5/5 strength bilaterally in order to be able to safely perform functional activities and demonstrate improved stability and strength. Eval: Grossly 4+/5 bilaterally except EV 4-/5  Current: same as above      PLAN  [x]  Continue Plan of Care  []  Upgrade Activities as Tolerated       [x]  Update Interventions per Flow Sheet, as Tolerated by Patient       []  Discharge Due To:   []  Other: Thank you for the referral to [facility]. Sarah Ortiz.  Aryan, PT, DPT, ATR     5/27/2022     9:09 AM

## 2022-05-27 NOTE — PROGRESS NOTES
[facility]  [address], [address] [facility]  Ph [phone]  Fx [phone]    Plan of Care/ Statement of Necessity for Physical Therapy Services    Patient name: Juju Adler Start of Care: 2022   Referral source: Yuly Burgess MD : 1960    Medical Diagnosis: Pain in left lower leg [M79.662]   Onset Date:22    Treatment Diagnosis: left ankle sprain with instability                                              ICD-10: Pain in left lower leg [M79.662]   Prior Hospitalization: see medical history Provider#: [id number]   Medications: Verified on Patient summary List    Comorbidities:  left ankle injury one year ago, OA, HTN, neck pain, chronic LBP, DM  Substance Use: []? tobacco use, [x]? alcohol use: socially, []? other:   Prior Level of Function:   Biking, walking 3 miles x 3-4x/week  [x]? Unrestricted with functional activities and ADLs  [x]? No assistive device  [x]? active lifestyle, []? moderately active lifestyle, []? sedentary lifestyle  Patients Goals:  \"to decrease pain and increase my strength, basically fix my ankle\"      The Plan of Care and following information is based on the information from the initial evaluation. Assessment/ key information: Patient is a very pleasant 64 y.o. female presenting to skilled PT with c/o left ankle pain that began after she fell last year and describes inverting her ankle. Patient states her doctor initially thought there was a hairline fracture, so he placed her in a boot (February last year). Patient states her pain went completely away, but they started back up a month ago when she mistepped on a small step. Patient states since then her pain has been worsening and occurs from her left lateral malleolus shooting do into her left foot along the 5th metatarsal region. Patient states her pain is increased with active eversion as well as difficulty/pain with standing and walking > a few minutes.   Today she demonstrates decreased left ankle AROM as well as decreased left ankle strength, which are likely in contribution to the instability of her left ankle. Of note, patient states she leaves 7/1/22 for a 5-week long trip out of the country.     Patient would benefit from skilled PT services to modify and progress therapeutic interventions, address functional mobility deficits, address ROM deficits, address strength deficits, analyze and address soft tissue restrictions, analyze and cue movement patterns, analyze and modify body mechanics/ergonomics, and assess and modify postural abnormalities to attain remaining goals. Patient did well with today's evaluation. Patient was educated in [address] [facility] and all questions were answered.     Evaluation Complexity History MEDIUM  Complexity : 1-2 comorbidities / personal factors will impact the outcome/ POC ; Examination LOW Complexity : 1-2 Standardized tests and measures addressing body structure, function, activity limitation and / or participation in recreation  ;Presentation LOW Complexity : Stable, uncomplicated  ;Clinical Decision Making Other outcome measures FOTO to be completed 2nd visit  LOW  : FOTO score = an established functional score where 100 = no disability  Overall Complexity Rating: LOW     Problem List: pain affecting function, decrease ROM, decrease strength, edema affecting function, impaired gait/ balance, decrease ADL/ functional abilitiies, decrease activity tolerance, decrease flexibility/ joint mobility and decrease transfer abilities   Treatment Plan may include any combination of the following: Therapeutic exercise, Therapeutic activities, Neuromuscular re-education, Physical agent/modality, Gait/balance training, Manual therapy, Aquatic therapy, Patient education, Self Care training, Functional mobility training, Home safety training and Stair training  Vasopnuematic compression justification:  Per bilateral girth measures taken and listed above the edema is considered significant and having an impact on the patient's strength, gait and ADLs  Patient / Family readiness to learn indicated by: asking questions, trying to perform skills and interest  Persons(s) to be included in education: patient  Barriers to Learning/Limitations: none  Measures taken if barriers to learning: n/a  Patient Self Reported Health Status: good  Rehabilitation Potential: good    Goals:  Short Term Goals:    to be accomplished within 2 weeks:     Patient will report compliance with prescribed HEP(s) at least 1x/day in order to assist in maximizing therapeutic gains, reduce symptoms, and to progress towards overall prior function. Eval: HEP to be issued and reviewed with patient within 1-2 visits  Current: same as above        Long Term Goals:     to be accomplished within 5 weeks:     Patient will meet or exceed points of physical change on FOTO as deemed by FOTO tool in order to maximize function and promote patient satisfaction with overall outcome. (Lei Likens is an established functional score where 100 = no disability)  Eval: FOTO to be completed 2nd visit  Current: same as above     Patient will be painfree during all functional activities/ADLs in order to improve QOL and return to patient's PLOF. Eval: pain ranges from 2-8; currently 4  Current: same as above     Patient will demonstrate functional and painfree AROM in order to return to prior functional activities and mobility. Eval: Left Ankle: WNL except PF 40 deg active/48 deg passive  Current: same as above     Patient will demonstrate at least 5/5 strength bilaterally in order to be able to safely perform functional activities and demonstrate improved stability and strength. Eval: Grossly 4+/5 bilaterally except EV 4-/5  Current: same as above    Frequency / Duration: Patient to be seen 2 times per week for 5 weeks.     Patient/ Caregiver education and instruction: Diagnosis, prognosis, activity modification   [x]  Plan of care has been reviewed with PTA    Thank you for the referral to [facility]. Kim Michel. Aryan, PT, DPT, [address] [facility]     2022     9:09 AM    Patient Name: Milton Davila Patient : 1960    ________________________________________________________________________    I certify that the above Therapy Services are being furnished while the patient is under my care. I agree with the treatment plan and certify that this therapy is necessary.     [address] Signature:_____________________Date:____________TIME:________                                      Mercedes Gilbert MD      ** Signature, Date and Time must be completed for valid certification **  Please sign and return to [facility] at [facility]  [address], [address] [facility] [address]  Ph [phone]  Fx [phone]

## 2022-06-02 ENCOUNTER — HOSPITAL ENCOUNTER (OUTPATIENT)
Dept: PHYSICAL THERAPY | Age: 62
Discharge: HOME | End: 2022-06-02
Payer: COMMERCIAL

## 2022-06-02 PROCEDURE — 97110 THERAPEUTIC EXERCISES: CPT

## 2022-06-02 PROCEDURE — 97530 THERAPEUTIC ACTIVITIES: CPT

## 2022-06-02 PROCEDURE — 97112 NEUROMUSCULAR REEDUCATION: CPT

## 2022-06-02 NOTE — PROGRESS NOTES
PT DAILY TREATMENT NOTE    Patient Name: Salvador Penaloza  Date:2022  : 1960  [x]  Patient  Verified  Payor: BLUE CROSS / Plan: [facility] PPO / Product Type: PPO /    In time:12:31  Out time:1:15  Total Treatment Time (min): 44  Total Timed Codes (min): 44  1:1 Treatment Time (MC/BCBS only): 44   Visit #: 2 of 10    Treatment Dx: Pain in left lower leg [M79.662]    SUBJECTIVE  Pain Level (0-10 scale): 0 with brace on   Any medication changes, allergies to medications, adverse drug reactions, diagnosis change, or new procedure performed?: [x] No    [] Yes (see summary sheet for update)  Subjective functional status/changes:   [] No changes reported  Patient reports no ankle pain as long as she is wearing her brace. OBJECTIVE      16 min Therapeutic Exercise:  [x] See flow sheet :   Rationale: increase ROM and increase strength to improve the patients ability to perform ADLs and ambulate    8 min Therapeutic Activity:  [x]  See flow sheet :   Rationale: increase ROM, increase strength, improve coordination and increase proprioception  to improve the patients ability to perform transfers and ADLs     20 min Neuromuscular Re-education:  [x]  See flow sheet :   Rationale: increase ROM, increase strength, improve coordination, improve balance and increase proprioception  to improve the patients ability to ambulate safely and increase ankle stability to prevent re-injury          With   [x] TE   [x] TA   [x] neuro   [] other: Patient Education: [x] Review HEP    [] Progressed/Changed HEP based on:   [] positioning   [] body mechanics   [] transfers   [] heat/ice application    [] other:      Other Objective/Functional Measures: FOTO: 49     Pain Level (0-10 scale) post treatment: 0/10    ASSESSMENT/Changes in Function:  Patient presents with no complaints of ankle pain with brace usage.  Completed session with use of brace for tolerance and will assess tolerance for decreasing use of brace next session. Patient tolerated initiation of ankle and lower extremity strengthening/ balance exercises without adverse reaction or pain. HEP initiated. Vaso not performed due to pain/swelling this session. Progress next visit as able. Patient will continue to benefit from skilled PT services to modify and progress therapeutic interventions, address functional mobility deficits, address ROM deficits, address strength deficits, analyze and address soft tissue restrictions, analyze and cue movement patterns, analyze and modify body mechanics/ergonomics, assess and modify postural abnormalities and instruct in home and community integration to attain remaining goals. [x]  See Plan of Care  []  See progress note/recertification  []  See Discharge Summary         Progress towards goals / Updated goals:  Short Term Goals:    to be accomplished within 2 weeks:     Patient will report compliance with prescribed HEP(s) at least 1x/day in order to assist in maximizing therapeutic gains, reduce symptoms, and to progress towards overall prior function. Eval: HEP to be issued and reviewed with patient within 1-2 visits  Current: HEP issued second visit 6/2/22        Long Term Goals:     to be accomplished within 5 weeks:     Patient Dee Oris or exceed points of physical change to 62 tool in order to maximize function and promote patient satisfaction with overall outcome. (Yana Eaton is an established functional score where 100 = no disability)  Eval: FOTO to be completed 2nd visit  Current: 52;  FOTO completed second visit on 6/2/22     Patient will be painfree during all functional activities/ADLs in order to improve QOL and return to patient's PLOF. Eval: pain ranges from 2-8; currently 4  Current: same as above     Patient will demonstrate functional and painfree AROM in order to return to prior functional activities and mobility.   Eval: Left Ankle: WNL except PF 40 deg active/48 deg passive  Current: same as above     Patient will demonstrate at least 5/5 strength bilaterally in order to be able to safely perform functional activities and demonstrate improved stability and strength.   Eval: Grossly 4+/5 bilaterally except EV 4-/5  Current: same as above       PLAN  []  Upgrade activities as tolerated     [x]  Continue plan of care  []  Update interventions per flow sheet       []  Discharge due to:_  []  Other:_      Magdiel Molina, PT 6/2/2022  7:59 AM    Future Appointments   Date Time Provider Kerri Aquino   6/2/2022 12:30 PM Marizol Can, [facility]   6/3/2022  1:15 PM Shirin Burgos [facility]   6/6/2022  1:15 PM Gem Baeza, [facility]   6/8/2022  2:45 PM [facility]   6/13/2022 11:00 AM Sallie Baeza [facility]   [facility]   6/20/2022 11:00 AM Sallie Baeza [facility]   6/22/2022 11:00 AM [facility]   6/27/2022 11:00 AM Lillian Chandler MD [facility]   6/27/2022  1:15 PM Gem Baeza, [facility]   6/29/2022 11:00 AM Gem Baeza [facility]

## 2022-06-03 ENCOUNTER — HOSPITAL ENCOUNTER (OUTPATIENT)
Dept: PHYSICAL THERAPY | Age: 62
Discharge: HOME | End: 2022-06-03
Payer: COMMERCIAL

## 2022-06-03 PROCEDURE — 97110 THERAPEUTIC EXERCISES: CPT

## 2022-06-03 PROCEDURE — 97530 THERAPEUTIC ACTIVITIES: CPT

## 2022-06-03 PROCEDURE — 97112 NEUROMUSCULAR REEDUCATION: CPT

## 2022-06-03 NOTE — PROGRESS NOTES
PT DAILY TREATMENT NOTE    Patient Name: Brad Lea  Date:6/3/2022  : 1960  [x]  Patient  Verified  Payor: BLUE LG / Plan: Maynor Lay [id number] PPO / Product Type: PPO /    In time:12:31  Out time:1:16  Total Treatment Time (min): 45  Total Timed Codes (min): 45  1:1 Treatment Time (MC/BCBS only): 45   Visit #: 3 of 10    Treatment Dx: Pain in left lower leg [M79.662]    SUBJECTIVE  Pain Level (0-10 scale): 2   Any medication changes, allergies to medications, adverse drug reactions, diagnosis change, or new procedure performed?: [x] No    [] Yes (see summary sheet for update)  Subjective functional status/changes:   [] No changes reported  Reports her thighs were a little sore after last session yesterday. OBJECTIVE    15 min Therapeutic Exercise:  [x] See flow sheet :   Rationale: increase ROM, increase strength and increase proprioception to improve the patients ability to return to PLOF. 18 min Therapeutic Activity:  [x]  See flow sheet :   Rationale: increase ROM, increase strength, improve balance and increase proprioception  to improve the patients ability to tolerate ADLs. 12 min Neuromuscular Re-education:  [x]  See flow sheet :   Rationale: increase strength, improve coordination, improve balance and increase proprioception  to improve the patients ability to improve ankle stability during functional activities. With   [] TE   [] TA   [] neuro   [] other: Patient Education: [x] Review HEP    [] Progressed/Changed HEP based on:   [] positioning   [] body mechanics   [] transfers   [] heat/ice application    [] other:        Pain Level (0-10 scale) post treatment: 2    ASSESSMENT/Changes in Function: Pt with good tolerance to today's session. Pt reported improvement in balance during performance of SLS exercise. Pt with good tolerance to addition of towel scrunches in order to improve arch support. Pt is making good progress towards their goals.  Pt will continue to benefit from skilled therapeutic intervention at this time to address th remaining deficits as discussed in goals below. Patient will continue to benefit from skilled PT services to modify and progress therapeutic interventions, address functional mobility deficits, address ROM deficits, address strength deficits, analyze and address soft tissue restrictions, analyze and cue movement patterns, analyze and modify body mechanics/ergonomics and address imbalance/dizziness to attain remaining goals. [x]  See Plan of Care  []  See progress note/recertification  []  See Discharge Summary         Progress towards goals / Updated goals:  Short Term Goals:    to be accomplished within 2 weeks:     Patient will report compliance with prescribed HEP(s) at least 1x/day in order to assist in maximizing therapeutic gains, reduce symptoms, and to progress towards overall prior function. Eval: HEP to be issued and reviewed with patient within 1-2 visits  Current: HEP issued second visit 6/2/22        Long Term Goals:     to be accomplished within 5 weeks:     Patient Hulan Nora or exceed points of physical change to 62 tool in order to maximize function and promote patient satisfaction with overall outcome. ([address] is an established functional score where 100 = no disability)  Eval: FOTO to be completed 2nd visit  Current: 52;  FOTO completed second visit on 6/2/22     Patient will be painfree during all functional activities/ADLs in order to improve QOL and return to patient's PLOF. Eval: pain ranges from 2-8; currently 4  Current: 6/3/22 - 5/10 at worst, 0/10 at best     Patient will demonstrate functional and painfree AROM in order to return to prior functional activities and mobility.   Eval: Left Ankle: WNL except PF 40 deg active/48 deg passive  Current: same as above      Patient will demonstrate at least 5/5 strength bilaterally in order to be able to safely perform functional activities and demonstrate improved stability and strength.   Eval: Grossly 4+/5 bilaterally except EV 4-/5  Current: same as above    PLAN  []  Upgrade activities as tolerated     [x]  Continue plan of care  []  Update interventions per flow sheet       []  Discharge due to:_  []  Other:_      Amando Cornejo, MINNIE 6/3/2022  8:35 AM    Future Appointments   Date Time Provider Kerri Aquino   6/3/2022 12:30 PM Deng [facility], [address] [facility] THE [facility]   6/6/2022  1:15 PM Katharine Baeza, [facility] THE [facility]   6/8/2022  2:45 PM [facility] THE [facility]   6/13/2022 11:00 AM [facility]   [facility]15/2022 11:45 [facility]   6/20/2022 11:00 AM [facility]   6/22/2022 11:00 AM [facility]   6/27/2022 11:00 AM Augustina Davis MD [facility] AM[facility]   6/27/2022  1:15 PM [facility]   6/29/2022 11:00 AM Katharine Baeza, [facility] THE [facility]

## 2022-06-06 ENCOUNTER — HOSPITAL ENCOUNTER (OUTPATIENT)
Dept: PHYSICAL THERAPY | Age: 62
Discharge: HOME | End: 2022-06-06
Payer: COMMERCIAL

## 2022-06-06 PROCEDURE — 97112 NEUROMUSCULAR REEDUCATION: CPT

## 2022-06-06 PROCEDURE — 97110 THERAPEUTIC EXERCISES: CPT

## 2022-06-06 PROCEDURE — 97530 THERAPEUTIC ACTIVITIES: CPT

## 2022-06-06 NOTE — PROGRESS NOTES
PT DAILY TREATMENT NOTE    Patient Name: Lazara Santos  Date: 2022   : 1960  [x]  Patient  Verified  Payor: Mariella Hogan / Plan: Maynor Lay [id number] PPO / Product Type: PPO /    In Time: 1:18  Out Time: 2:04  Total Treatment Time (min): 46  Total Timed Codes (min): 46  1:1 Treatment Time ( only): 42   Visit #: 4/10    Treatment Dx: Pain in left lower leg [M79.662]    SUBJECTIVE  Pre-Treatment Pain Level (0-10 scale): 2    Any medication changes, allergies to medications, adverse drug reactions, diagnosis change, or new procedure performed?: [x] No    [] Yes (see summary sheet for update)    Subjective Functional Status/Changes:  [] No changes reported  \"I have no ankle pain if I don't move my ankle, but it's like a 2/10 when I walk and a 4/10 when I turn my ankle this way (eversion). \"  Patient states she is also having left knee pain, which has been ongoing for years. \"I'm trying to get another round of Effexor injections in my left knee. I had them last year and it really helped. \"  Patient states her left ankle is hurting more today because of a bike ride she went for this weekend. Patient states she was able to walk a mile last week without any issues for her left ankle.     OBJECTIVE    20 min Therapeutic Exercise:  [x] See flow sheet   Rationale: increase ROM, increase strength, and decrease pain to assist in improving patient's ability to perform functional activities and ADLs    10 min Therapeutic Activity:  [x] See flow sheet   Rationale: increase ROM, increase strength, and improve coordination to assist in improving patient's ability to perform functional activities and ADLs    12 min Neuromuscular Re-Education:  [x] See flow sheet   Rationale: improve coordination, improve balance/proprioception, improve posture, and improve core stabilization to assist in improving patient's ability to perform functional activities and ADLs as well as to improve core stabilization during static/dynamic movements      With   [x] TE   [] TA   [] neuro   [] other: Patient Education: [x] Review HEP    [] Progressed/Changed HEP based on:   [] positioning   [] body mechanics   [] transfers   [] heat/ice application    [] other:      Other Objective/Functional Measures: see goals below     Pain Level (0-10 scale) Post Treatment: 2    ASSESSMENT/Changes in Function:  Patient responded well to today's treatment without any adverse reactions. Patient very challenged with NBOS balance activities such as tandem stance and SLS, with occasional LOB that required her to touch down her hands on the parallel bars. Patient will continue to benefit from skilled PT services to further modify and progress therapeutic interventions, address functional mobility deficits, address ROM deficits, address strength deficits, analyze and address soft tissue restrictions, analyze and cue movement patterns, analyze and modify body mechanics/ergonomics, assess and modify postural abnormalities, and instruct in home and community integration to attain remaining goals. [x]  See Plan of Care  []  See Progress Note/Recertification  []  See Discharge Summary         Progress Towards Goals/Updated Goals:    Short Term Goals:    to be accomplished within 2 weeks:     Patient will report compliance with prescribed HEP(s) at least 1x/day in order to assist in maximizing therapeutic gains, reduce symptoms, and to progress towards overall prior function. Eval: HEP to be issued and reviewed with patient within 1-2 visits  Current: Patient reports daily compliance with her HEP.     6/6/22, met        Long Term Goals:     to be accomplished within 5 weeks:     Patient Geraline Blanca or exceed points of physical change to 62 tool in order to maximize function and promote patient satisfaction with overall outcome.   (Geralene Das is an established functional score where 100 = no disability)  Eval: FOTO to be completed 2nd visit  Current: 49;  FOTO completed second visit on 6/2/22     Patient will be painfree during all functional activities/ADLs in order to improve QOL and return to patient's PLOF. Eval: pain ranges from 2-8; currently 4  Current: pain ranges from 0-5/10 during the last week    6/6/22, in progress     Patient will demonstrate functional and painfree AROM in order to return to prior functional activities and mobility. Eval: Left Ankle: WNL except PF 40 deg active/48 deg passive  Current: same as above      Patient will demonstrate at least 5/5 strength bilaterally in order to be able to safely perform functional activities and demonstrate improved stability and strength. Eval: Grossly 4+/5 bilaterally except EV 4-/5  Current: same as above    PLAN  []  Upgrade Activities as Tolerated     [x]  Continue Plan of Care  []  Update Interventions per Flow Sheet       []  Discharge Due To:_  []  Other:_      Yanira Parra.  MINNIE Baeza, DPT, ATR  6/6/2022 8:29 AM    Future Appointments   Date Time Provider Kerri Aquino   6/6/2022  1:15 PM Monet Parra PT [facility] THE [facility]   6/8/2022  2:45 PM [facility]   6/13/2022 11:00 AM [facility]   6/15/2022 11:45 AM Glenis Baeza [facility] THE [facility]   6/20/2022 11:00 AM Glenis Baeza [facility]   6/22/2022 11:00 AM [facility] THE [facility]   6/27/2022 11:00 AM Saul Walden MD [facility] AM[facility]   6/27/2022  1:15 PM Glenis Bae[facility]   6/29/2022 11:00 AM Yanira Baeza, PT [facility]

## 2022-06-07 ENCOUNTER — TELEPHONE (OUTPATIENT)
Dept: PHYSICAL THERAPY | Age: 62
End: 2022-06-07

## 2022-06-08 ENCOUNTER — APPOINTMENT (OUTPATIENT)
Dept: PHYSICAL THERAPY | Age: 62
End: 2022-06-08
Payer: COMMERCIAL

## 2022-06-09 ENCOUNTER — HOSPITAL ENCOUNTER (OUTPATIENT)
Dept: PHYSICAL THERAPY | Age: 62
Discharge: HOME | End: 2022-06-09
Payer: COMMERCIAL

## 2022-06-09 PROCEDURE — 97112 NEUROMUSCULAR REEDUCATION: CPT

## 2022-06-09 PROCEDURE — 97110 THERAPEUTIC EXERCISES: CPT

## 2022-06-09 NOTE — PROGRESS NOTES
PT DAILY TREATMENT NOTE    Patient Name: Gladis Arellano  Date:2022  : 1960  [x]  Patient  Verified  Payor: BLUE CROSS / Plan: [facility] PPO / Product Type: PPO /    In time:2:02  Out time:2:48  Total Treatment Time (min): 46  Total Timed Codes (min): 46  1:1 Treatment Time (MC/BCBS only): 55   Visit #: 5 of 10    Treatment Dx: Pain in left lower leg [M79.662]    SUBJECTIVE  Pain Level (0-10 scale): 0/10  Any medication changes, allergies to medications, adverse drug reactions, diagnosis change, or new procedure performed?: [x] No    [] Yes (see summary sheet for update)  Subjective functional status/changes:   [] No changes reported  \"I don't have any pain right now. I really only have pain If I try to move it to the side (Inversion). \"    OBJECTIVE      36 min Therapeutic Exercise:  [x] See flow sheet :   Rationale: increase ROM, increase strength and improve coordination to improve the patients ability to return to prior level of physical activity. 10 min Neuromuscular Re-education:  [x]  See flow sheet :   Rationale: increase ROM, increase strength and improve coordination  to improve the patients ability to return to prior level of physical activity. s      With   [] TE   [] TA   [] neuro   [] other: Patient Education: [x] Review HEP    [] Progressed/Changed HEP based on:   [] positioning   [] body mechanics   [] transfers   [] heat/ice application    [] other:      Other Objective/Functional Measures:      Pain Level (0-10 scale) post treatment: 0/10    ASSESSMENT/Changes in Function: Pt arrived reporting no pain in ankle at this time. Pt continues to wear brace distributed by MD. Pt reports no pain throughout daily activities. Pt reported increased fatigue with ex but, not above tolerance. Pt denied pain throughout tx or post therex.      Patient will continue to benefit from skilled PT services to modify and progress therapeutic interventions, address functional mobility deficits, address ROM deficits, address strength deficits, analyze and address soft tissue restrictions, analyze and cue movement patterns, analyze and modify body mechanics/ergonomics and assess and modify postural abnormalities to attain remaining goals. [x]  See Plan of Care  []  See progress note/recertification  []  See Discharge Summary         Progress towards goals / Updated goals:  Short Term Goals:    to be accomplished within 2 weeks:     Patient will report compliance with prescribed HEP(s) at least 1x/day in order to assist in maximizing therapeutic gains, reduce symptoms, and to progress towards overall prior function. Eval: HEP to be issued and reviewed with patient within 1-2 visits  Current: Patient reports daily compliance with her HEP.     6/6/22, met        Long Term Goals:     to be accomplished within 5 weeks:     Patient Lupie Holter or exceed points of physical change to 62 tool in order to maximize function and promote patient satisfaction with overall outcome. (Ana Armstrong is an established functional score where 100 = no disability)  Eval: FOTO to be completed 2nd visit  Current: 49;  FOTO completed second visit on 6/2/22     Patient will be painfree during all functional activities/ADLs in order to improve QOL and return to patient's PLOF. Eval: pain ranges from 2-8; currently 4  Current: pain ranges from 0-5/10 during the last week    6/6/22, in progress     Patient will demonstrate functional and painfree AROM in order to return to prior functional activities and mobility. Eval: Left Ankle: WNL except PF 40 deg active/48 deg passive  Current: NA     Patient will demonstrate at least 5/5 strength bilaterally in order to be able to safely perform functional activities and demonstrate improved stability and strength.   Eval: Grossly 4+/5 bilaterally except EV 4-/5  Current: same as above    PLAN  [x]  Upgrade activities as tolerated     [x]  Continue plan of care  []  Update interventions per flow sheet       []  Discharge due to:_  []  Other:_      Ladonna Ion, PTA 6/9/2022  2:10 PM    Future Appointments   Date Time Provider Kerri Aquino   6/13/2022 11:00 AM [facility] THE [facility]   6/15/2022 11:45 AM Kimberly Baeza, MINNIE [facility] THE [facility]   6/20/2022 11:00 AM Aryan, [facility] THE [facility]   6/22/2022 11:00 AM [facility] THE [facility]   6/27/2022 11:00 AM MD [facility]   6/27/2022  1:15 PM [facility] THE [facility]   6/29/2022 11:00 AM Kimberly Baeza, [facility] THE [facility]

## 2022-06-13 ENCOUNTER — HOSPITAL ENCOUNTER (OUTPATIENT)
Dept: PHYSICAL THERAPY | Age: 62
Discharge: HOME | End: 2022-06-13
Payer: COMMERCIAL

## 2022-06-13 PROCEDURE — 97530 THERAPEUTIC ACTIVITIES: CPT

## 2022-06-13 PROCEDURE — 97110 THERAPEUTIC EXERCISES: CPT

## 2022-06-13 PROCEDURE — 97112 NEUROMUSCULAR REEDUCATION: CPT

## 2022-06-13 NOTE — PROGRESS NOTES
PT DAILY TREATMENT NOTE    Patient Name: Demetrice Saeed  Date:2022  : 1960  [x]  Patient  Verified  Payor: BLUE LG / Plan: Maynor Lay [id number] PPO / Product Type: PPO /    In time:11:46  Out time:12:30  Total Treatment Time (min): 44  Total Timed Codes (min): 44  1:1 Treatment Time (MC/BCBS only): 44   Visit #: 6 of 10    Treatment Dx: Pain in left lower leg [M79.662]    SUBJECTIVE  Pain Level (0-10 scale): 0/10  Any medication changes, allergies to medications, adverse drug reactions, diagnosis change, or new procedure performed?: [x] No    [] Yes (see summary sheet for update)  Subjective functional status/changes:   [] No changes reported  \"I have no pain right now. I had some soreness after last time but, I was ok. \"     OBJECTIVE        22 min Therapeutic Exercise:  [x] See flow sheet :   Rationale: increase ROM, increase strength and improve coordination to improve the patients ability to return to prior level of physical activity. 12 min Therapeutic Activity:  [x]  See flow sheet :   Rationale: increase ROM, increase strength and improve coordination  to improve the patients ability to return to prior level of physical activity. 10 min Neuromuscular Re-education:  [x]  See flow sheet :   Rationale: increase ROM, increase strength and improve coordination  to improve the patients ability to return to prior level of physical activity. With   [] TE   [] TA   [] neuro   [] other: Patient Education: [x] Review HEP    [] Progressed/Changed HEP based on:   [] positioning   [] body mechanics   [] transfers   [] heat/ice application    [] other:      Other Objective/Functional Measures:      Pain Level (0-10 scale) post treatment: 0/10    ASSESSMENT/Changes in Function: Pt tolerated session well with no increased pain. Pt continues to demonstrated mild instability in ankle with balance activities. Pt continues to report increased fatigue with ex but, not above tolerance.  Pt demonstrated improved FOTO score this visit. Patient will continue to benefit from skilled PT services to modify and progress therapeutic interventions, address functional mobility deficits, address ROM deficits, address strength deficits, analyze and address soft tissue restrictions, analyze and cue movement patterns, analyze and modify body mechanics/ergonomics and assess and modify postural abnormalities to attain remaining goals. [x]  See Plan of Care  []  See progress note/recertification  []  See Discharge Summary         Progress towards goals / Updated goals:  Short Term Goals:    to be accomplished within 2 weeks:     Patient will report compliance with prescribed HEP(s) at least 1x/day in order to assist in maximizing therapeutic gains, reduce symptoms, and to progress towards overall prior function. Eval: HEP to be issued and reviewed with patient within 1-2 visits  Current: Patient reports daily compliance with her HEP.     6/6/22, met        Long Term Goals:     to be accomplished within 5 weeks:     Patient will meet or exceed points of physical change to 62 tool in order to maximize function and promote patient satisfaction with overall outcome. ([address] is an established functional score where 100 = no disability)  Eval: FOTO to be completed 2nd visit  Last PN: 49;  FOTO completed second visit on 6/2/22    Current: FOTO 59 6/13/22    Patient will be painfree during all functional activities/ADLs in order to improve QOL and return to patient's PLOF. Eval: pain ranges from 2-8; currently 4  Current: pain ranges from 0-5/10 during the last week    6/6/22, in progress     Patient will demonstrate functional and painfree AROM in order to return to prior functional activities and mobility.   Eval: Left Ankle: WNL except PF 40 deg active/48 deg passive  Current: NA     Patient will demonstrate at least 5/5 strength bilaterally in order to be able to safely perform functional activities and demonstrate improved stability and strength.   Eval: Grossly 4+/5 bilaterally except EV 4-/5  Current: Pt continues to progress with resistance band therex 6/13/22    PLAN  [x]  Upgrade activities as tolerated     [x]  Continue plan of care  []  Update interventions per flow sheet       []  Discharge due to:_  []  Other:_      Reather Allmoises, PTA 6/13/2022  10:37 AM    Future Appointments   Date Time Provider Kerri Kenia   6/13/2022 11:45 AM Neto Calvin PTA [facility] THE [facility]   6/15/2022 11:45 AM Jose L Baeza, MINNIE [facility] THE [facility]   6/20/2022 11:00 AM Nimisha Baeza [facility]   6/22/2022 11:00 AM Corky Monday [facility] THE [facility]   6/27/2022 11:00 AM Suzette Schilder, MD [facility]   6/27/2022  1:15 PM Nimisha Baeza [facility] THE [facility]   6/29/2022 11:00 AM Jose L Baeza, MINNIE [facility] THE [facility]

## 2022-06-15 ENCOUNTER — HOSPITAL ENCOUNTER (OUTPATIENT)
Dept: PHYSICAL THERAPY | Age: 62
Discharge: HOME | End: 2022-06-15
Payer: COMMERCIAL

## 2022-06-15 PROCEDURE — 97530 THERAPEUTIC ACTIVITIES: CPT

## 2022-06-15 PROCEDURE — 97110 THERAPEUTIC EXERCISES: CPT

## 2022-06-15 PROCEDURE — 97112 NEUROMUSCULAR REEDUCATION: CPT

## 2022-06-15 NOTE — PROGRESS NOTES
PT DAILY TREATMENT NOTE    Patient Name: Lazara Santos  Date: 6/15/2022  : 1960  [x]  Patient  Verified  Payor: BLUE LG / Plan: Maynor Lay [id number] PPO / Product Type: PPO /    In Time: 11:50  Out Time: 12:45  Total Treatment Time (min): 55  Total Timed Codes (min): 55  1:1 Treatment Time ( only): 55   Visit #: 7/10    Treatment Dx: Pain in left lower leg [M79.662]    SUBJECTIVE  Pre-Treatment Pain Level (0-10 scale): 0    Any medication changes, allergies to medications, adverse drug reactions, diagnosis change, or new procedure performed?: [x] No    [] Yes (see summary sheet for update)    Subjective Functional Status/Changes:  [] No changes reported  Patient states she still prefers to wear her ankle brace because it helps to reduce pain. \"I've tried going without the brace when I'm walking around my house and the pain goes up, and then still tends to linger for a couple of hours afterwards, so I've decided to keep wearing it. \"    OBJECTIVE    20 min Therapeutic Exercise:  [x] See flow sheet   Rationale: increase ROM, increase strength, and decrease pain to assist in improving patient's ability to perform functional activities and ADLs    10 min Therapeutic Activity:  [x] See flow sheet   Rationale: increase ROM, increase strength, and improve coordination to assist in improving patient's ability to perform functional activities and ADLs    25 min Neuromuscular Re-Education:  [x] See flow sheet   Rationale: improve coordination, improve balance/proprioception, improve posture, and improve core stabilization to assist in improving patient's ability to perform functional activities and ADLs as well as to improve core stabilization during static/dynamic movements      With   [x] TE   [] TA   [] neuro   [] other: Patient Education: [x] Review HEP    [] Progressed/Changed HEP based on:   [] positioning   [] body mechanics   [] transfers   [] heat/ice application    [] other:      Other Objective/Functional Measures: see goals below     Pain Level (0-10 scale) Post Treatment: 0    ASSESSMENT/Changes in Function:  Patient responded well to today's treatment without any adverse reactions. She did well with the initiation of further neuromuscular/proprioceptive exercises today, which included BOSU step up and overs as well as rockerboard 3 way. She demonstrated good ankle/hip control during these and did not have any LOB. Patient continues to progress towards goals. Will plan to initiate further neuromuscular/strengthening exercises next visit, including the dynamic warmup to further improve patients dynamic stability during functional movements and activities. Patient will continue to benefit from skilled PT services to further modify and progress therapeutic interventions, address functional mobility deficits, address ROM deficits, address strength deficits, analyze and address soft tissue restrictions, analyze and cue movement patterns, analyze and modify body mechanics/ergonomics, assess and modify postural abnormalities, and instruct in home and community integration to attain remaining goals. [x]  See Plan of Care  [x]  See Progress Note/Recertification  []  See Discharge Summary         Progress Towards Goals/Updated Goals:    Short Term Goals:    to be accomplished within 2 weeks:     Patient will report compliance with prescribed HEP(s) at least 1x/day in order to assist in maximizing therapeutic gains, reduce symptoms, and to progress towards overall prior function. Eval: HEP to be issued and reviewed with patient within 1-2 visits  Current: Patient reports daily compliance with her HEP.     6/6/22, met        Long Term Goals:     to be accomplished within 5 weeks:     Patient will meet or exceed points of physical change to 62 tool in order to maximize function and promote patient satisfaction with overall outcome.   (FOTO is an established functional score where 100 = no disability)  Eval: FOTO to be completed 2nd visit  Last PN: 49;  FOTO completed second visit on 6/2/22              Current: FOTO 59   6/13/22, in progress     Patient will be painfree during all functional activities/ADLs in order to improve QOL and return to patient's PLOF. Eval: pain ranges from 2-8; currently 4  Current: pain ranges from 0-5/10 during the last week    6/6/22, in progress     Patient will demonstrate functional and painfree AROM in order to return to prior functional activities and mobility. Eval: Left Ankle: WNL except PF 40 deg active/48 deg passive  Current: NA     Patient will demonstrate at least 5/5 strength bilaterally in order to be able to safely perform functional activities and demonstrate improved stability and strength. Eval: Grossly 4+/5 bilaterally except EV 4-/5  Current: Pt continues to progress with resistance band therex 6/13/22    PLAN  []  Upgrade Activities as Tolerated     [x]  Continue Plan of Care  []  Update Interventions per Flow Sheet       []  Discharge Due To:_  []  Other:_      Gem Cavazos.  MINNIE Baeza, DPT, ATR  6/15/2022 9:58 AM    Future Appointments   Date Time Provider Kerri Aquino   6/15/2022 11:45 AM Gem Baeza PT [facility]   6/20/2022 11:00 AM Sallie Baeza [facility]   6/22/2022 11:00 AM Para Block [facility]   6/27/2022 11:00 AM Lillian Chandler MD [facility]   6/27/2022  1:15 PM Sallie Baeza [facility]   6/29/2022 11:00 AM Gem Baeza PT [facility]

## 2022-06-20 ENCOUNTER — TELEPHONE (OUTPATIENT)
Dept: PHYSICAL THERAPY | Age: 62
End: 2022-06-20

## 2022-06-20 ENCOUNTER — HOSPITAL ENCOUNTER (OUTPATIENT)
Dept: PHYSICAL THERAPY | Age: 62
End: 2022-06-20
Payer: COMMERCIAL

## 2022-06-22 ENCOUNTER — HOSPITAL ENCOUNTER (OUTPATIENT)
Dept: PHYSICAL THERAPY | Age: 62
Discharge: HOME | End: 2022-06-22
Payer: COMMERCIAL

## 2022-06-22 PROCEDURE — 97112 NEUROMUSCULAR REEDUCATION: CPT

## 2022-06-22 PROCEDURE — 97530 THERAPEUTIC ACTIVITIES: CPT

## 2022-06-22 PROCEDURE — 97110 THERAPEUTIC EXERCISES: CPT

## 2022-06-22 PROCEDURE — 97535 SELF CARE MNGMENT TRAINING: CPT
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
## 2022-06-22 NOTE — PROGRESS NOTES
[facility] [address] [facility], [address]  Ph [phone]  Fx [phone]    Physical Therapy Progress Note  Patient name: Brad Lea Start of Care: 2022   Referral source: Brandee Gonzalez MD : 1960   Medical/Treatment Diagnosis: Pain in left lower leg [M79.662] Onset Date:   Prior Hospitalization: see medical history Provider#: [id number]   Medications: Verified on Patient Summary List    Comorbidities: left ankle injury one year ago, OA, HTN, neck pain, chronic LBP, DM  Prior Level of Function: Biking, walking 3 miles x 3-4x/week  [x] Unrestricted with functional activities and ADLs  [x] No assistive device  [x] active lifestyle, [] moderately active lifestyle, [x] sedentary lifestyle    Visits from Start of Care: 8    Missed Visits: 4    Goals/Measure of Progress:    Short Term Goals:    to be accomplished within 2 weeks:     Patient will report compliance with prescribed HEP(s) at least 1x/day in order to assist in maximizing therapeutic gains, reduce symptoms, and to progress towards overall prior function. Eval: HEP to be issued and reviewed with patient within 1-2 visits  Current: Patient reports daily compliance with her HEP.     22, met        Long Term Goals:     to be accomplished within 5 weeks:     Patient will meet or exceed points of physical change to 62 tool in order to maximize function and promote patient satisfaction with overall outcome. (Tasha Slicker is an established functional score where 100 = no disability)  Eval: FOTO to be completed 2nd visit  Last PN: 52;  FOTO completed second visit on 22              Current: FOTO 59   22, in progress     Patient will be painfree during all functional activities/ADLs in order to improve QOL and return to patient's PLOF.   Eval: pain ranges from 2-8; currently 4  Current: pt reports 4/10 at worst in the past week 22     Patient will demonstrate functional and painfree AROM in order to return to prior functional activities and mobility. Eval: Left Ankle: WNL except PF 40 deg active/48 deg passive  Current: WFL for all ranges and pain free AROM  6/22/22 Met     Patient will demonstrate at least 5/5 strength bilaterally in order to be able to safely perform functional activities and demonstrate improved stability and strength. Eval: Grossly 4+/5 bilaterally except EV 4-/5              Current: 5/5 with all ranges except for 4+/5 6/22/22    Key Functional Changes:  Pt has made good progress since initial eval. Pt reports having returned to all normal daily activities with only moderate pain. Pt reports weaning out of brace daily but, still utilizing. Pt demonstrated improved strength with all ganges, only having slight deficits with Eversion. Pt also demonstrated good improvement with ROM as well with only minor deficits present in plantar flexion. Pt reports continued moderate pain at worst but, less often. Pt will benefit from final week of therapy in prep for D/C to vacation and return to full daily functions with independent HEP performance.      ASSESSMENT/RECOMMENDATIONS:   [x]Continue therapy per initial plan/protocol at a frequency of  1-2 x per week for 2 weeks for 3 total visits  []Continue therapy with the following recommended changes:_____________________ _____________________________ ________________________________________  []Discontinue therapy progressing towards or have reached established goals  []Discontinue therapy due to lack of appreciable progress towards goals  []Discontinue therapy due to lack of attendance or compliance  []Await Physician's recommendations/decisions regarding therapy  []Other:________________________________________________________________    Thank you for this referral.   Percilla Phalen, PTA 6/22/2022 12:21 PM

## 2022-06-27 ENCOUNTER — HOSPITAL ENCOUNTER (OUTPATIENT)
Dept: PHYSICAL THERAPY | Age: 62
Discharge: HOME | End: 2022-06-27
Payer: COMMERCIAL

## 2022-06-27 PROCEDURE — 97530 THERAPEUTIC ACTIVITIES: CPT

## 2022-06-27 PROCEDURE — 97112 NEUROMUSCULAR REEDUCATION: CPT

## 2022-06-27 NOTE — PROGRESS NOTES
PT DAILY TREATMENT NOTE    Patient Name: Andrew Del Rosario  Date: 2022  : 1960  [x]  Patient  Verified  Payor: Therese Nayak / Plan: Maynor Lay [id number] PPO / Product Type: PPO /    In Time: 1:15  Out Time: 1:55  Total Treatment Time (min): 40  Total Timed Codes (min): 40  1:1 Treatment Time ( only): 38   Visit #: 10/11    Treatment Dx: Pain in left lower leg [M79.662]    SUBJECTIVE  Pre-Treatment Pain Level (0-10 scale): 2    Any medication changes, allergies to medications, adverse drug reactions, diagnosis change, or new procedure performed?: [x] No    [] Yes (see summary sheet for update)    Subjective Functional Status/Changes:  [] No changes reported  Patient states she has walking without her ankle brace over the past few days and denies any additional pain in doing so. She is excited about her upcoming trip to [facility].     OBJECTIVE    5 min Therapeutic Exercise:  [x] See flow sheet   Rationale: increase ROM, increase strength, and decrease pain to assist in improving patient's ability to perform functional activities and ADLs    10 min Therapeutic Activity:  [x] See flow sheet   Rationale: increase ROM, increase strength, and improve coordination to assist in improving patient's ability to perform functional activities and ADLs    23 min Neuromuscular Re-Education:  [x] See flow sheet   Rationale: improve coordination, improve balance/proprioception, improve posture, and improve core stabilization to assist in improving patient's ability to perform functional activities and ADLs as well as to improve core stabilization during static/dynamic movements      With   [x] TE   [] TA   [] neuro   [] other: Patient Education: [x] Review HEP    [] Progressed/Changed HEP based on:   [] positioning   [] body mechanics   [] transfers   [] heat/ice application    [] other:      Other Objective/Functional Measures: see goals below     Pain Level (0-10 scale) Post Treatment: 0    ASSESSMENT/Changes in Function:  Patient responded well to today's treatment without any adverse reactions. Patient able to progress to further dynamic stability/propriopceptive exercises today by performing the dynamic warmup. This include forward marching, lateral marching, lateral sidestepping, tandem walk, and retro walking. Patient did well with all, though was moderately challenged with balance during tandem walking, causing occasional LOB requiring a stepping strategy. Patient overall continues to do very well. Patient will continue to benefit from skilled PT services to further modify and progress therapeutic interventions, address functional mobility deficits, address ROM deficits, address strength deficits, analyze and address soft tissue restrictions, analyze and cue movement patterns, analyze and modify body mechanics/ergonomics, assess and modify postural abnormalities, and instruct in home and community integration to attain remaining goals. [x]  See Plan of Care  []  See Progress Note/Recertification  []  See Discharge Summary         Progress Towards Goals/Updated Goals:    Short Term Goals:    to be accomplished within 2 weeks:     Patient will report compliance with prescribed HEP(s) at least 1x/day in order to assist in maximizing therapeutic gains, reduce symptoms, and to progress towards overall prior function. Eval: HEP to be issued and reviewed with patient within 1-2 visits  Current: Patient reports daily compliance with her HEP.     6/6/22, met       Long Term Goals:     to be accomplished within 5 weeks:     Patient will meet or exceed points of physical change to 62 tool in order to maximize function and promote patient satisfaction with overall outcome.   (Hanna Orly is an established functional score where 100 = no disability)  Eval: FOTO to be completed 2nd visit  Last PN: 49;  FOTO completed second visit on 6/2/22              Current: FOTO 59   6/13/22, in progress     Patient will be painfree during all functional activities/ADLs in order to improve QOL and return to patient's PLOF. Eval: pain ranges from 2-8; currently 4  Current: pt reports 4/10 at worst in the past week 6/22/22     Patient will demonstrate functional and painfree AROM in order to return to prior functional activities and mobility. Eval: Left Ankle: WNL except PF 40 deg active/48 deg passive  Current: WFL for all ranges and pain free AROM  6/22/22 Met     Patient will demonstrate at least 5/5 strength bilaterally in order to be able to safely perform functional activities and demonstrate improved stability and strength. Eval: Grossly 4+/5 bilaterally except EV 4-/5              Current: 5/5 with all ranges except for 4+/5 6/22/22    PLAN  []  Upgrade Activities as Tolerated     [x]  Continue Plan of Care  []  Update Interventions per Flow Sheet       []  Discharge Due To:_  []  Other:_      Erling Heimlich.  MINNIE Baeza, DPT, ATR  6/27/2022 8:13 AM    Future Appointments   Date Time Provider Kerri Aquino   6/27/2022  1:15 PM Genelle Duane [facility]   6/29/2022 11:00 AM [place], Erling Heimlich, PT [facility]

## 2022-06-29 ENCOUNTER — APPOINTMENT (OUTPATIENT)
Dept: PHYSICAL THERAPY | Age: 62
End: 2022-06-29
Payer: COMMERCIAL

## 2022-06-30 ENCOUNTER — HOSPITAL ENCOUNTER (OUTPATIENT)
Dept: PHYSICAL THERAPY | Age: 62
Discharge: HOME | End: 2022-06-30
Payer: COMMERCIAL

## 2022-06-30 PROCEDURE — 97110 THERAPEUTIC EXERCISES: CPT

## 2022-06-30 NOTE — PROGRESS NOTES
Physical Therapy Discharge Instructions    [facility]  [address] [place], [address]  Ph [phone]  Fx [phone]      Patient: Baljinder Solo  : 1960      Continue Home Exercise Program 3 x times per week      Continue with    [x] Ice  as needed     [] Heat           Follow up with MD:     [] Upon completion of therapy     [x] As needed      Recommendations:     [x]   Return to activity with home program    []   Return to activity with the following modifications:       []Post Rehab Program    []Join Independent aquatic program     []Return to/join local gym        Additional Comments: Ice as needed if your ankle becomes sore/swollen while walking around [facility]. Enjoy your trip!     Rudolph Hoyt 63, PT 2022 8:47 AM

## 2022-06-30 NOTE — PROGRESS NOTES
PT DAILY TREATMENT NOTE    Patient Name: Baljinder Solo  Date: 2022  : 1960  [x]  Patient  Verified  Payor: BLUE CROSS / Plan: [facility] PPO / Product Type: PPO /    In Time: 11:49  Out Time: 12:08  Total Treatment Time (min): 19  Total Timed Codes (min): 19  1:1 Treatment Time [facility] only):14   Visit #:     Treatment Dx: Pain in left lower leg [M79.662]    SUBJECTIVE  Pre-Treatment Pain Level (0-10 scale): 0    Any medication changes, allergies to medications, adverse drug reactions, diagnosis change, or new procedure performed?: [x] No    [] Yes (see summary sheet for update)    Subjective Functional Status/Changes:  [] No changes reported  Patient states her left ankle continues to do well. No pain today, but she does report up to 2/10 pain occasionally during her functional activities. \"But the pain isn't too bad and I can manage that myself. The pain doesn't stop be from doing what I want anymore. \"  She feels confident with her HEP exercises and is pleased with how her left ankle is doing. Patient requested to hold on exercises today secondary to needing to make it to another appt at another location. OBJECTIVE    19 min Therapeutic Exercise:  [x] See flow sheet   Rationale: increase ROM, increase strength, and decrease pain to assist in improving patient's ability to perform functional activities and ADLs    With   [x] TE   [] TA   [] neuro   [] other: Patient Education: [x] Review HEP    [] Progressed/Changed HEP based on:   [] positioning   [] body mechanics   [] transfers   [] heat/ice application    [] other:      Other Objective/Functional Measures: see goals below     Pain Level (0-10 scale) Post Treatment: 0    ASSESSMENT/Changes in Function:  Patient responded well to today's treatment without any adverse reactions. Patient has completed 11 visits of skilled PT for her left ankle.   Patient has met or partially met all PT goals at this time and is overall doing very well. She demonstrates excellent AROM and strength of bilateral ankles and states she has returned to all of her normal functional activities. She does still have some minor pain, rated 0-2/10, but she states the pain is tolerable and is self-manageable with her HEP. Patient is hereby D/C'd from PT to an independent HEP at this time. Thank you for the referral to [facility]. []  See Plan of Care  []  See Progress Note/Recertification  [x]  See Discharge Summary         Progress Towards Goals/Updated Goals:    Short Term Goals:    to be accomplished within 2 weeks:     Patient will report compliance with prescribed HEP(s) at least 1x/day in order to assist in maximizing therapeutic gains, reduce symptoms, and to progress towards overall prior function. Eval: HEP to be issued and reviewed with patient within 1-2 visits  Current: Patient reports daily compliance with her HEP.     6/6/22, met        Long Term Goals:     to be accomplished within 5 weeks:     Patient will meet or exceed points of physical change to 62 tool in order to maximize function and promote patient satisfaction with overall outcome. (Thirza Onofre is an established functional score where 100 = no disability)  Eval: FOTO to be completed 2nd visit  Last PN: 49;  FOTO completed second visit on 6/2/22              Current: FOTO 69   6/30/22,  met      Patient will be painfree during all functional activities/ADLs in order to improve QOL and return to patient's PLOF. Eval: pain ranges from 2-8; currently 4  Current: patient reports 0-2/10 pain during functional activities/ADL's; patient states her ankle pain does not limit her from doing her normal activities   6/30/22, partially met     Patient will demonstrate functional and painfree AROM in order to return to prior functional activities and mobility.   Eval: Left Ankle: WNL except PF 40 deg active/48 deg passive  Current: WFL for all ranges and pain free AROM  6/22/22 Met     Patient will demonstrate at least 5/5 strength bilaterally in order to be able to safely perform functional activities and demonstrate improved stability and strength. Eval: Grossly 4+/5 bilaterally except EV 4-/5              Current: Bilateral LE's including ankles 5/5 6/30/22, met    PLAN  []  Upgrade Activities as Tolerated     []  Continue Plan of Care  []  Update Interventions per Flow Sheet       [x]  Discharge Due To:_goals met/partially met and patient ready to transition to an independent HEP  []  Other:_      Chong Clarity.  Aryan, PT, DPT, ATR  6/30/2022 8:43 AM
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
## 2022-07-01 ENCOUNTER — TELEPHONE (OUTPATIENT)
Dept: ORTHOPEDIC SURGERY | Age: 62
End: 2022-07-01

## 2022-07-01 NOTE — TELEPHONE ENCOUNTER
Patient called stating that her insurance will only allow the pharmacy to fill 60 tabs of lyrica at the time and she needs the other half of the RX filled. The pharmacy would not fill it until tomorrow without our permission. She is leaving for [facility] today and needs this filled. I called the pharmacy and gave permission for them to fill it 1 day early.

## 2022-08-09 ENCOUNTER — OFFICE VISIT (OUTPATIENT)
Dept: ORTHOPEDIC SURGERY | Age: 62
End: 2022-08-09
Payer: COMMERCIAL

## 2022-08-09 VITALS
BODY MASS INDEX: 35.79 KG/M2 | HEIGHT: 67 IN | TEMPERATURE: 98.4 F | WEIGHT: 228 LBS | HEART RATE: 76 BPM | OXYGEN SATURATION: 100 %

## 2022-08-09 DIAGNOSIS — M54.12 CERVICAL NEURITIS: ICD-10-CM

## 2022-08-09 DIAGNOSIS — M48.061 SPINAL STENOSIS OF LUMBAR REGION WITHOUT NEUROGENIC CLAUDICATION: ICD-10-CM

## 2022-08-09 DIAGNOSIS — M51.36 DDD (DEGENERATIVE DISC DISEASE), LUMBAR: ICD-10-CM

## 2022-08-09 DIAGNOSIS — M50.30 DDD (DEGENERATIVE DISC DISEASE), CERVICAL: ICD-10-CM

## 2022-08-09 PROCEDURE — 99213 OFFICE O/P EST LOW 20 MIN: CPT | Performed by: NURSE PRACTITIONER

## 2022-08-09 RX ORDER — PREGABALIN 225 MG/1
225 CAPSULE ORAL 2 TIMES DAILY
Qty: 60 CAPSULE | Refills: 5 | Status: SHIPPED | OUTPATIENT
Start: 2022-08-09

## 2022-08-09 RX ORDER — INSULIN GLARGINE 100 [IU]/ML
30 INJECTION, SOLUTION SUBCUTANEOUS
COMMUNITY

## 2022-08-09 RX ORDER — DULOXETIN HYDROCHLORIDE 60 MG/1
60 CAPSULE, DELAYED RELEASE ORAL
Qty: 90 CAPSULE | Refills: 1 | Status: SHIPPED | OUTPATIENT
Start: 2022-08-09

## 2022-08-09 NOTE — PROGRESS NOTES
Dana Ag presents today for   Chief Complaint   Patient presents with    Neck Pain     Right side    Shoulder Pain     right       Is someone accompanying this pt? no    Is the patient using any DME equipment during OV? no    Depression Screening:  3 most recent PHQ Screens 6/30/2021   Little interest or pleasure in doing things Not at all   Feeling down, depressed, irritable, or hopeless Not at all   Total Score PHQ 2 0       Learning Assessment:  No flowsheet data found. Abuse Screening:  Abuse Screening Questionnaire 8/9/2022   Do you ever feel afraid of your partner? N   Are you in a relationship with someone who physically or mentally threatens you? N   Is it safe for you to go home? Y       Fall Risk  Fall Risk Assessment, last 12 mths 8/9/2022   Able to walk? Yes   Fall in past 12 months? 1   Do you feel unsteady? 0   Are you worried about falling 0   Is the gait abnormal? 1   Number of falls in past 12 months 1   Fall with injury? 1       Coordination of Care:  1. Have you been to the ER, urgent care clinic since your last visit? Yes, pt went to the ER in [facility] after she fell and hurt her hand. Hospitalized since your last visit? no    2. Have you seen or consulted any other health care providers outside of the [address] [place] [address] since your last visit? no Include any pap smears or colon screening.  no

## 2022-08-09 NOTE — PROGRESS NOTES
Chief complaint   Chief Complaint   Patient presents with    Neck Pain     Right side    Shoulder Pain     right       History of Present Illness:  Mekhi Rojas is a  58 y.o.  female who comes in today for follow-up of her chronic neck and back pain. She gets neck pain and tingling that radiates to her right shoulder and upper arm. She states currently it is very controlled with Lyrica to 225 milligrams twice a day and Cymbalta 60 mg daily. She also does get some back pain but she states right now its not really hurting her. She just returned Sunday from a trip to [facility] and she was there for 5 weeks. She did trip and have a fall and broke her right hand. She will be following up with a hand doctor now that she is back in the [facility]. She denies fever bowel bladder dysfunction. Physical Exam: Patient is a 72-year-old female well-developed well-nourished who is alert and oriented with a normal mood and affect. She has a full weightbearing nonantalgic gait. She has 4 out of 5 strength bilateral upper extremities and lower extremities. Negative straight leg raise. Negative Long's. She does have her right hand and forearm in a cast including her fourth and fifth digits of the right hand. Assessment and Plan: This is a patient who has cervical degenerative disc disease and lumbar spinal stenosis. She is doing well on Lyrica and Cymbalta. I have refilled them for her. We will see her back in 6 months sooner if needed. Medications:  Current Outpatient Medications   Medication Sig Dispense Refill    insulin glargine (Lantus Solostar U-100 Insulin) 100 unit/mL (3 mL) inpn 30 Units by SubCUTAneous route nightly. pregabalin (LYRICA) 225 mg capsule Take 1 Capsule by mouth two (2) times a day. Max Daily Amount: 450 mg. 60 Capsule 5    DULoxetine (CYMBALTA) 60 mg capsule Take 1 Capsule by mouth nightly.  90 Capsule 1    ferrous sulfate (Slow Fe) 142 mg (45 mg iron) ER tablet Take 1 Tablet by mouth daily. phentermine (ADIPEX-P) 37.5 mg tablet Take 37.5 mg by mouth daily. triamcinolone (ARISTOCORT) 0.5 % topical cream two (2) times a day. glucose blood VI test strips (ASCENSIA AUTODISC VI, ONE TOUCH ULTRA TEST VI) strip 50 Each. Blood-Glucose Meter misc 1 Each. ibuprofen (MOTRIN) 600 mg tablet Take 600 mg by mouth daily as needed. HUMALOG KWIKPEN INSULIN 100 unit/mL kwikpen INJECT 6 TO 8 UNITS SUBCUTANEOUSLY THREE TIMES DAILY BEFORE MEALS      ketorolac (ACULAR) 0.5 % ophthalmic solution INSTILL 1 DROP INTO RIGHT EYE THREE TIMES DAILY      lancets 33 gauge misc USE TO CHECK BLOOD SUGAR THREE TIMES A DAY      ONETOUCH DELICA PLUS LANCET 33 gauge misc USE 1 TO CHECK GLUCOSE THREE TIMES DAILY      losartan-hydroCHLOROthiazide (HYZAAR) 100-25 mg per tablet Take 1 Tablet by mouth in the morning.      magnesium oxide (MAG-OX) 400 mg tablet Take 400 mg by mouth in the morning. Insulin Needles, Disposable, 32 gauge x 5/32\" ndle USE TO INJECT INSULIN FOUR TIMES A DAY      aspirin delayed-release 81 mg tablet Take 81 mg by mouth in the morning.      multivitamin (ONE A DAY) tablet Take 1 Tab by mouth daily. cholecalciferol (VITAMIN D3) 25 mcg (1,000 unit) cap Take 1,000 Units by mouth in the morning. CRESTOR 20 mg tablet Take 20 mg by mouth daily. metFORMIN (GLUCOPHAGE) 1,000 mg tablet Take 1,000 mg by mouth two (2) times daily (with meals). ACETAMINOPHEN (TYLENOL EXTRA STRENGTH PO) Take 500-1,000 mg by mouth daily as needed. insulin glargine (Basaglar KwikPen U-100 Insulin) 100 unit/mL (3 mL) inpn 30 Units by SubCUTAneous route.       methylPREDNISolone (MEDROL DOSEPACK) 4 mg tablet Per dose pack instructions 1 Dose Pack 0           Review of systems:    Past Medical History:   Diagnosis Date    Arthritis     Cervical radiculopathy     Closed fracture of right hand with routine healing 07/2022    Diabetes ([facility] [address]     Hypercholesteremia     Hypertension Past Surgical History:   Procedure Laterality Date    HX BREAST LUMPECTOMY      HX HYSTERECTOMY      HX OTHER SURGICAL Left     wrist tendon release    HX TONSILLECTOMY       Social History     Socioeconomic History    Marital status:      Spouse name: Not on file    Number of children: Not on file    Years of education: Not on file    Highest education level: Not on file   Occupational History    Not on file   Tobacco Use    Smoking status: Never    Smokeless tobacco: Never   Vaping Use    Vaping Use: Not on file   Substance and Sexual Activity    Alcohol use: Yes     Alcohol/week: 0.8 standard drinks     Types: 1 Glasses of wine per week    Drug use: No    Sexual activity: Not on file   Other Topics Concern    Not on file   Social History Narrative    Not on file     Social Determinants of Health     Financial Resource Strain: Not on file   Food Insecurity: Not on file   Transportation Needs: Not on file   Physical Activity: Not on file   Stress: Not on file   Social Connections: Not on file   Intimate Partner Violence: Not on file   Housing Stability: Not on file     Family History   Problem Relation Age of Onset    Cancer Other     Diabetes Other     Other Other         Connective tissue disorder       Physical Exam:  Visit Vitals  Pulse 76   Temp 98.4 °F (36.9 °C) (Oral)   Ht 5' 7\" (1.702 m)   Wt 228 lb (103.4 kg)   SpO2 100% Comment: RA   BMI 35.71 kg/m²     Pain Scale: 0 - No pain/10       has been . reviewed and is appropriate          Diagnoses and all orders for this visit:    1. Spinal stenosis of lumbar region without neurogenic claudication  -     pregabalin (LYRICA) 225 mg capsule; Take 1 Capsule by mouth two (2) times a day. Max Daily Amount: 450 mg.  -     DULoxetine (CYMBALTA) 60 mg capsule; Take 1 Capsule by mouth nightly. 2. DDD (degenerative disc disease), cervical  -     pregabalin (LYRICA) 225 mg capsule; Take 1 Capsule by mouth two (2) times a day.  Max Daily Amount: 450 mg.  - DULoxetine (CYMBALTA) 60 mg capsule; Take 1 Capsule by mouth nightly. 3. Cervical neuritis  -     pregabalin (LYRICA) 225 mg capsule; Take 1 Capsule by mouth two (2) times a day. Max Daily Amount: 450 mg.  -     DULoxetine (CYMBALTA) 60 mg capsule; Take 1 Capsule by mouth nightly. 4. DDD (degenerative disc disease), lumbar  -     pregabalin (LYRICA) 225 mg capsule; Take 1 Capsule by mouth two (2) times a day. Max Daily Amount: 450 mg.  -     DULoxetine (CYMBALTA) 60 mg capsule; Take 1 Capsule by mouth nightly. Follow-up and Dispositions    Return in about 6 months (around 2/9/2023) for with GRIFFIN Haley. We have informed Jackie Mace to notify us for immediate appointment if she has any worsening neurogical symptoms or if an emergency situation presents, then call 911    Please note that this dictation was completed with [employer], the computer voice recognition software. Quite often unanticipated grammatical, syntax, homophones, and other interpretive errors are inadvertently transcribed by the computer software. Please disregard these errors. Please excuse any errors that have escaped final proofreading.

## 2022-12-06 ENCOUNTER — OFFICE VISIT (OUTPATIENT)
Dept: ORTHOPEDIC SURGERY | Age: 62
End: 2022-12-06
Payer: COMMERCIAL

## 2022-12-06 VITALS
DIASTOLIC BLOOD PRESSURE: 79 MMHG | OXYGEN SATURATION: 99 % | BODY MASS INDEX: 37.92 KG/M2 | HEIGHT: 67 IN | SYSTOLIC BLOOD PRESSURE: 130 MMHG | TEMPERATURE: 97.2 F | HEART RATE: 90 BPM | WEIGHT: 241.6 LBS

## 2022-12-06 DIAGNOSIS — M54.12 CERVICAL NEURITIS: ICD-10-CM

## 2022-12-06 DIAGNOSIS — M51.36 DDD (DEGENERATIVE DISC DISEASE), LUMBAR: ICD-10-CM

## 2022-12-06 DIAGNOSIS — M48.061 SPINAL STENOSIS OF LUMBAR REGION WITHOUT NEUROGENIC CLAUDICATION: ICD-10-CM

## 2022-12-06 DIAGNOSIS — S46.011A TRAUMATIC COMPLETE TEAR OF RIGHT ROTATOR CUFF, INITIAL ENCOUNTER: Primary | ICD-10-CM

## 2022-12-06 DIAGNOSIS — M50.30 DDD (DEGENERATIVE DISC DISEASE), CERVICAL: ICD-10-CM

## 2022-12-06 PROCEDURE — 99214 OFFICE O/P EST MOD 30 MIN: CPT | Performed by: NURSE PRACTITIONER

## 2022-12-06 RX ORDER — METHYLPREDNISOLONE 4 MG/1
TABLET ORAL
Qty: 1 DOSE PACK | Refills: 0 | Status: SHIPPED | OUTPATIENT
Start: 2022-12-06

## 2022-12-06 RX ORDER — PREGABALIN 225 MG/1
225 CAPSULE ORAL 2 TIMES DAILY
Qty: 60 CAPSULE | Refills: 5 | Status: SHIPPED | OUTPATIENT
Start: 2022-12-14

## 2022-12-06 NOTE — PROGRESS NOTES
Chief complaint   Chief Complaint   Patient presents with    Back Pain    Neck Pain       History of Present Illness:  Daniel Cruz is a  58 y.o.  female who comes in today stating a month ago she flared up her back and is now getting some right lower extremity pain into the thigh. She states she had ordered a weighted to use for exercise but after 2 days it caused her back and leg to her. She is no longer using that device. She states her neck pain is doing okay currently. She is managing with Lyrica to 25 mg twice a day and Cymbalta 60 mg daily. Additionally she states when she had her fall the summer and early she also injured her right shoulder. She did see somebody for that and had an MRI done that shows a right rotator cuff tear. She would like to be referred to an orthopedic he takes her insurance as to when she went to additionally does not take her insurance. She denies fever bowel bladder dysfunction. Physical Exam: Patient is a 70-year-old female well-developed well-nourished who is alert and oriented with a normal mood and affect. She has a full weightbearing nonantalgic gait. She has 4 out of 5 strength bilateral upper extremities and lower extremities. Negative straight leg raise. Positive Long's on the left. Assessment and Plan: This is a patient who is having a flare of back pain given her some right radicular pain. I will give her a Medrol Dosepak to calm that down. She knows not to take it with other anti-inflammatories and to take it with food. I have refilled her Lyrica. She does not need the Cymbalta at this time. I referred her to Dr. Lilliam Eugene for her shoulder. We will see her back in 6 months sooner if needed. Medications:  Current Outpatient Medications   Medication Sig Dispense Refill    [START ON 12/14/2022] pregabalin (LYRICA) 225 mg capsule Take 1 Capsule by mouth two (2) times a day.  Max Daily Amount: 450 mg. 60 Capsule 5    methylPREDNISolone (Medrol, Jose E,) 4 mg tablet Per dose pack instructions 1 Dose Pack 0    insulin glargine (LANTUS,BASAGLAR) 100 unit/mL (3 mL) inpn 30 Units by SubCUTAneous route nightly. DULoxetine (CYMBALTA) 60 mg capsule Take 1 Capsule by mouth nightly. 90 Capsule 1    ferrous sulfate (Slow Fe) 142 mg (45 mg iron) ER tablet Take 1 Tablet by mouth daily. phentermine (ADIPEX-P) 37.5 mg tablet Take 37.5 mg by mouth daily. triamcinolone (ARISTOCORT) 0.5 % topical cream two (2) times a day. glucose blood VI test strips (ASCENSIA AUTODISC VI, ONE TOUCH ULTRA TEST VI) strip 50 Each. Blood-Glucose Meter misc 1 Each. ibuprofen (MOTRIN) 600 mg tablet Take 600 mg by mouth daily as needed. HUMALOG KWIKPEN INSULIN 100 unit/mL kwikpen INJECT 6 TO 8 UNITS SUBCUTANEOUSLY THREE TIMES DAILY BEFORE MEALS      ketorolac (ACULAR) 0.5 % ophthalmic solution INSTILL 1 DROP INTO RIGHT EYE THREE TIMES DAILY      lancets 33 gauge misc USE TO CHECK BLOOD SUGAR THREE TIMES A DAY      ONETOUCH DELICA PLUS LANCET 33 gauge misc USE 1 TO CHECK GLUCOSE THREE TIMES DAILY      losartan-hydroCHLOROthiazide (HYZAAR) 100-25 mg per tablet Take 1 Tablet by mouth in the morning.      magnesium oxide (MAG-OX) 400 mg tablet Take 400 mg by mouth in the morning. Insulin Needles, Disposable, 32 gauge x 5/32\" ndle USE TO INJECT INSULIN FOUR TIMES A DAY      aspirin delayed-release 81 mg tablet Take 81 mg by mouth in the morning.      multivitamin (ONE A DAY) tablet Take 1 Tab by mouth daily. cholecalciferol (VITAMIN D3) 25 mcg (1,000 unit) cap Take 1,000 Units by mouth in the morning. CRESTOR 20 mg tablet Take 20 mg by mouth daily. metFORMIN (GLUCOPHAGE) 1,000 mg tablet Take 1,000 mg by mouth two (2) times daily (with meals). ACETAMINOPHEN (TYLENOL EXTRA STRENGTH PO) Take 500-1,000 mg by mouth daily as needed.              Review of systems:    Past Medical History:   Diagnosis Date    Arthritis     Cervical radiculopathy     Closed fracture of right hand with routine healing 07/2022    Diabetes ([facility] [address]     Hypercholesteremia     Hypertension      Past Surgical History:   Procedure Laterality Date    HX BREAST LUMPECTOMY      HX HYSTERECTOMY      HX OTHER SURGICAL Left     wrist tendon release    HX TONSILLECTOMY       Social History     Socioeconomic History    Marital status:      Spouse name: Not on file    Number of children: Not on file    Years of education: Not on file    Highest education level: Not on file   Occupational History    Not on file   Tobacco Use    Smoking status: Never    Smokeless tobacco: Never   Vaping Use    Vaping Use: Not on file   Substance and Sexual Activity    Alcohol use: Yes     Alcohol/week: 0.8 standard drinks     Types: 1 Glasses of wine per week    Drug use: No    Sexual activity: Not on file   Other Topics Concern    Not on file   Social History Narrative    Not on file     Social Determinants of Health     Financial Resource Strain: Not on file   Food Insecurity: Not on file   Transportation Needs: Not on file   Physical Activity: Not on file   Stress: Not on file   Social Connections: Not on file   Intimate Partner Violence: Not on file   Housing Stability: Not on file     Family History   Problem Relation Age of Onset    Cancer Other     Diabetes Other     Other Other         Connective tissue disorder       Physical Exam:  Visit Vitals  /79   Pulse 90   Temp 97.2 °F (36.2 °C) (Temporal)   Ht 5' 7\" (1.702 m)   Wt 241 lb 9.6 oz (109.6 kg)   SpO2 99%   BMI 37.84 kg/m²     Pain Scale: 5/10       has been . reviewed and is appropriate          Diagnoses and all orders for this visit:    1. Traumatic complete tear of right rotator cuff, initial encounter  -     REFERRAL TO ORTHOPEDICS    2. Spinal stenosis of lumbar region without neurogenic claudication  -     pregabalin (LYRICA) 225 mg capsule; Take 1 Capsule by mouth two (2) times a day.  Max Daily Amount: 450 mg.  - methylPREDNISolone (Medrol, Jose E,) 4 mg tablet; Per dose pack instructions    3. DDD (degenerative disc disease), cervical  -     pregabalin (LYRICA) 225 mg capsule; Take 1 Capsule by mouth two (2) times a day. Max Daily Amount: 450 mg.    4. Cervical neuritis  -     pregabalin (LYRICA) 225 mg capsule; Take 1 Capsule by mouth two (2) times a day. Max Daily Amount: 450 mg.    5. DDD (degenerative disc disease), lumbar  -     pregabalin (LYRICA) 225 mg capsule; Take 1 Capsule by mouth two (2) times a day. Max Daily Amount: 450 mg.  -     methylPREDNISolone (Medrol, Jose E,) 4 mg tablet; Per dose pack instructions          Follow-up and Dispositions    Return in about 6 months (around 6/6/2023) for with GRIFFIN Haley. We have informed Tiera Vega to notify us for immediate appointment if she has any worsening neurogical symptoms or if an emergency situation presents, then call 911    Please note that this dictation was completed with [employer], the [employer] voice recognition software. Quite often unanticipated grammatical, syntax, homophones, and other interpretive errors are inadvertently transcribed by the computer software. Please disregard these errors. Please excuse any errors that have escaped final proofreading.

## 2023-01-06 ENCOUNTER — OFFICE VISIT (OUTPATIENT)
Dept: ORTHOPEDIC SURGERY | Age: 63
End: 2023-01-06
Payer: COMMERCIAL

## 2023-01-06 VITALS — HEIGHT: 67 IN | BODY MASS INDEX: 37.51 KG/M2 | RESPIRATION RATE: 18 BRPM | WEIGHT: 239 LBS

## 2023-01-06 DIAGNOSIS — G89.29 CHRONIC RIGHT SHOULDER PAIN: ICD-10-CM

## 2023-01-06 DIAGNOSIS — M25.511 CHRONIC RIGHT SHOULDER PAIN: ICD-10-CM

## 2023-01-06 DIAGNOSIS — M75.102 NONTRAUMATIC TEAR OF LEFT ROTATOR CUFF, UNSPECIFIED TEAR EXTENT: Primary | ICD-10-CM

## 2023-01-06 DIAGNOSIS — S46.011A TRAUMATIC COMPLETE TEAR OF RIGHT ROTATOR CUFF, INITIAL ENCOUNTER: ICD-10-CM

## 2023-01-06 RX ORDER — TRIAMCINOLONE ACETONIDE 40 MG/ML
40 INJECTION, SUSPENSION INTRA-ARTICULAR; INTRAMUSCULAR ONCE
Status: COMPLETED | OUTPATIENT
Start: 2023-01-06 | End: 2023-01-06

## 2023-01-06 RX ADMIN — TRIAMCINOLONE ACETONIDE 40 MG: 40 INJECTION, SUSPENSION INTRA-ARTICULAR; INTRAMUSCULAR at 09:01

## 2023-01-06 NOTE — PROGRESS NOTES
Patient: Tabitha Romero                MRN: [id number]       SSN: [id number]  YOB: 1960        AGE: 58 y.o. SEX: female  Body mass index is 37.43 kg/m². PCP: Rosita Collins MD  01/06/23    CHIEF COMPLAINT: Bilateral shoulder pain    HPI: Tabitha Romero is a 58 y.o. female patient who presents to the office with bilateral shoulder pain. The right shoulder is been bothering her for 6 to 7 months. The right shoulder was injured after a fall while on a trip in [facility]. She is tripped over some cobblestones and fell onto her right arm. She says she fractured her wrist.  This did not require surgery. Shortly thereafter the fall she noted difficulty with range of motion and strength of her right shoulder. This is gotten better but pain has set in. She had an MRI of her right shoulder done which showed a rotator cuff tear. She is also recently for the past 2 to 3 weeks noticed some weakness and decreased range of motion in the left shoulder. No injury or trauma to the left shoulder recently. Past Medical History:   Diagnosis Date    Arthritis     Cervical radiculopathy     Closed fracture of right hand with routine healing 07/2022    Diabetes ([facility] [address]     Hypercholesteremia     Hypertension        Family History   Problem Relation Age of Onset    Cancer Other     Diabetes Other     Other Other         Connective tissue disorder       Current Outpatient Medications   Medication Sig Dispense Refill    pregabalin (LYRICA) 225 mg capsule Take 1 Capsule by mouth two (2) times a day. Max Daily Amount: 450 mg. 60 Capsule 5    methylPREDNISolone (Medrol, Jose E,) 4 mg tablet Per dose pack instructions 1 Dose Pack 0    insulin glargine (LANTUS,BASAGLAR) 100 unit/mL (3 mL) inpn 30 Units by SubCUTAneous route nightly. DULoxetine (CYMBALTA) 60 mg capsule Take 1 Capsule by mouth nightly. 90 Capsule 1    ferrous sulfate (Slow Fe) 142 mg (45 mg iron) ER tablet Take 1 Tablet by mouth daily. phentermine (ADIPEX-P) 37.5 mg tablet Take 37.5 mg by mouth daily. triamcinolone (ARISTOCORT) 0.5 % topical cream two (2) times a day. glucose blood VI test strips (ASCENSIA AUTODISC VI, ONE TOUCH ULTRA TEST VI) strip 50 Each. Blood-Glucose Meter misc 1 Each. ibuprofen (MOTRIN) 600 mg tablet Take 600 mg by mouth daily as needed. HUMALOG KWIKPEN INSULIN 100 unit/mL kwikpen INJECT 6 TO 8 UNITS SUBCUTANEOUSLY THREE TIMES DAILY BEFORE MEALS      ketorolac (ACULAR) 0.5 % ophthalmic solution INSTILL 1 DROP INTO RIGHT EYE THREE TIMES DAILY      lancets 33 gauge misc USE TO CHECK BLOOD SUGAR THREE TIMES A DAY      ONETOUCH DELICA PLUS LANCET 33 gauge misc USE 1 TO CHECK GLUCOSE THREE TIMES DAILY      losartan-hydroCHLOROthiazide (HYZAAR) 100-25 mg per tablet Take 1 Tablet by mouth in the morning.      magnesium oxide (MAG-OX) 400 mg tablet Take 400 mg by mouth in the morning. Insulin Needles, Disposable, 32 gauge x 5/32\" ndle USE TO INJECT INSULIN FOUR TIMES A DAY      aspirin delayed-release 81 mg tablet Take 81 mg by mouth in the morning.      multivitamin (ONE A DAY) tablet Take 1 Tab by mouth daily. cholecalciferol (VITAMIN D3) 25 mcg (1,000 unit) cap Take 1,000 Units by mouth in the morning. CRESTOR 20 mg tablet Take 20 mg by mouth daily. metFORMIN (GLUCOPHAGE) 1,000 mg tablet Take 1,000 mg by mouth two (2) times daily (with meals). ACETAMINOPHEN (TYLENOL EXTRA STRENGTH PO) Take 500-1,000 mg by mouth daily as needed.        Current Facility-Administered Medications   Medication Dose Route Frequency Provider Last Rate Last Admin    triamcinolone acetonide (KENALOG-40) 40 mg/mL injection 40 mg  40 mg Intra artICUlar ONCE Monica Sandoval MD           Allergies   Allergen Reactions    Actos [Pioglitazone] Unable to Obtain    Daypro [Oxaprozin] Unknown (comments)    Lisinopril Unable to Obtain    Other Medication Unable to Obtain     error    Pioglitazone-Metformin Swelling    Topamax [Topiramate] Other (comments)     Unknown reaction    Zocor [Simvastatin] Myalgia       Past Surgical History:   Procedure Laterality Date    HX BREAST LUMPECTOMY      HX HYSTERECTOMY      HX OTHER SURGICAL Left     wrist tendon release    HX TONSILLECTOMY         Social History     Socioeconomic History    Marital status:      Spouse name: Not on file    Number of children: Not on file    Years of education: Not on file    Highest education level: Not on file   Occupational History    Not on file   Tobacco Use    Smoking status: Never    Smokeless tobacco: Never   Vaping Use    Vaping Use: Not on file   Substance and Sexual Activity    Alcohol use: Yes     Alcohol/week: 0.8 standard drinks     Types: 1 Glasses of wine per week    Drug use: No    Sexual activity: Not on file   Other Topics Concern    Not on file   Social History Narrative    Not on file     Social Determinants of Health     Financial Resource Strain: Not on file   Food Insecurity: Not on file   Transportation Needs: Not on file   Physical Activity: Unknown    Days of Exercise per Week: 2 days    Minutes of Exercise per Session: Not on file   Stress: Not on file   Social Connections: Not on file   Intimate Partner Violence: Not At Risk    Fear of Current or Ex-Partner: No    Emotionally Abused: No    Physically Abused: No    Sexually Abused: No   Housing Stability: Not on file       REVIEW OF SYSTEMS:    14 point review of systems on the intake form is negative except as noted in the HPI    PHYSICAL EXAMINATION:  Visit Vitals  Resp 18   Ht 5' 7\" (1.702 m)   Wt 239 lb (108.4 kg)   BMI 37.43 kg/m²     Body mass index is 37.43 kg/m². GENERAL: Alert and oriented x3, in no acute distress, well-developed, well-nourished. HEENT: Normocephalic, atraumatic.     Shoulder Examination     R   L  ROM   FF  Full   Full  ER  Full   Full   IR  Full   Full  Rotator Cuff Pain   Supra  +   +   Infra  +   +   Subscap -   -  Crepitus  -   -  Effusion  -   -  Warmth  -   -   Erythema  -   -  Instability  -   -  AC Joint TTP  -   -  Clavicle   Deformity -   -   TTP  -   -  Proximal Humerus   Deformity -   -   TTP  -   -  Deltoid Strength 5   5  Biceps Strength 5   5  Biceps Deformity -   -  Biceps Groove Pain -   -  Impingement Sign -   -       IMAGING:  Imaging read by myself and interpreted as follows:  X-rays of the right shoulder 4 views were taken in the office today. These do not show any significant acute bony abnormalities. Previous x-rays of the left shoulder reviewed in the office today which do not show any significant acute or chronic bony abnormalities    An MRI of the right shoulder was reviewed in the office today. This shows a full-thickness tear with minimal retraction and minimal fatty atrophy of the supraspinatus and infraspinatus. There is also a tear of the long head of the biceps. ASSESSMENT & PLAN  Diagnosis: Right shoulder rotator cuff tear, concern for left shoulder rotator cuff tear    Nelson Beckham has an MRI documented right shoulder rotator cuff tear. We discussed the treatment options for this today at length and she opted for surgery. We will start the process of getting surgery set up. Her risk factors for surgery include the chronicity of the injury and the large tear. For the left shoulder I have a suspicion for rotator cuff tear as well based on her exam.  We will try to treat this conservatively for now with a corticosteroid injection. If this continues to be symptomatic we will consider an MRI in the future. Prescription medication management discussed.      [facility] [place]  OFFICE PROCEDURE PROGRESS NOTE        Chart reviewed for the following:   Toni CLEMENTE MD, have reviewed the History, Physical and updated the Allergic reactions for [address] performed immediately prior to start of procedure:   BRYN Rahel Simmons MD, have performed the following reviews on Coryda Sis prior to the start of the procedure:            * Patient was identified by name and date of birth   * Agreement on procedure being performed was verified  * Risks and Benefits explained to the patient  * Procedure site verified and marked as necessary  * Patient was positioned for comfort  * Consent was signed and verified    Time: 8:55 AM    Location: Left shoulder joint subacromial space injection    Kenalog 40mg & 3cc Lidocaine    Date of procedure: 1/6/2023    Procedure performed by:  Rahel Simmons MD    Provider assisted by: In Office MA    Patient assisted by: self    How tolerated by patient: tolerated the procedure well with no complications    Post Procedural Pain Scale: 0 - No Hurt    Comments: none      Electronically signed by: Rahel Simmons MD    Note: This note was completed using voice recognition software.   Any typographical/name errors or mistakes are unintentional.

## 2023-03-13 DIAGNOSIS — Z01.810 PRE-OPERATIVE CARDIOVASCULAR EXAMINATION: ICD-10-CM

## 2023-03-13 DIAGNOSIS — S46.011A STRAIN OF MUSCLE(S) AND TENDON(S) OF THE ROTATOR CUFF OF RIGHT SHOULDER, INITIAL ENCOUNTER: Primary | ICD-10-CM

## 2023-03-13 DIAGNOSIS — Z01.818 PREOPERATIVE TESTING: ICD-10-CM

## 2023-03-15 DIAGNOSIS — S46.011D TRAUMATIC COMPLETE TEAR OF RIGHT ROTATOR CUFF, SUBSEQUENT ENCOUNTER: Primary | ICD-10-CM

## 2023-03-15 DIAGNOSIS — S46.012D TRAUMATIC COMPLETE TEAR OF LEFT ROTATOR CUFF, SUBSEQUENT ENCOUNTER: ICD-10-CM

## 2023-04-05 ENCOUNTER — HOSPITAL ENCOUNTER (OUTPATIENT)
Facility: HOSPITAL | Age: 63
Setting detail: RECURRING SERIES
Discharge: HOME | End: 2023-04-08
Payer: COMMERCIAL

## 2023-04-05 ENCOUNTER — TELEPHONE (OUTPATIENT)
Age: 63
End: 2023-04-05

## 2023-04-05 PROCEDURE — 97110 THERAPEUTIC EXERCISES: CPT

## 2023-04-05 PROCEDURE — 97165 OT EVAL LOW COMPLEX 30 MIN: CPT

## 2023-04-05 NOTE — TELEPHONE ENCOUNTER
[facility] called asking if the order from 3/15 for Physical therapy can be changed to occupational therapy. They can be reached at [phone].  Please fax the order to [phone]

## 2023-04-05 NOTE — PROGRESS NOTES
2+/5            5/5          IR                                3+/5              5/5                       Special Tests:  Shoulder Left Right   Relocation     Speed's - -   Empty Can - -                                                                                                  Sensation: NVIT     Observation: Pt with upper trap elevation with shoulder abduction beyond 40 degrees, shoulder flexion over 90 degrees. Palpation:  Pt with minimal pain with palpation over rotator cuff intervals. Dense soft tissue noted through upper trap, trigger points noted through levator scapulae. Pt with mass on anterior deltoid distribution, most likely distracted proximal bicep tendon with swelling around, no pain upon palpation but noted swelling. Pain Level (0-10 scale) post treatment: 2/10    ASSESSMENT/Changes in Function: Pt presents with right shoulder dysfunction following fall in July 2022 causing traumatic infraspinatus and supraspinatus full rupture, and potential rupture of proximal insertion of the bicep. Pt presents with weakness in abduction and external rotation, decreased active motion overhead limiting ability to dress, do her hair, lift, and perform her kick boxing activities. Pt educated on diagnosis and maximal potential and goals of therapy to strength surrounding musculature, strengthening of periscapular region, deltoid and remaining intact rotator cuff musculature. Pt educated on exercise, posturing, and isometrics and scapular mobilizations. Pt is an excellent candidate for skilled OT services to maximize strength and function of the rotator cuff.       Patient will continue to benefit from skilled OT services to modify and progress therapeutic interventions, address functional mobility deficits, address ROM deficits, address strength deficits, analyze and address soft tissue restrictions, analyze and cue movement patterns, analyze and modify body mechanics/ergonomics,

## 2023-04-05 NOTE — TELEPHONE ENCOUNTER
Per Dr Zamzam Corral was placed to PT due to it being for the shoulder.  We will not be changing order to OT

## 2023-04-05 NOTE — PROGRESS NOTES
exercises    STG:  3 Weeks   Pt will be able to improve active abduction to above 120 degrees in pain free range to improve functional reaching patterns      Eval status: 85  2. Pt will be able to improve active external rotation to over 60 degrees to improve ability to reach behind head   Eval status. 45 degrees   3. Pt will be able to improve muscle grade of shoulder flexion to 4/5 to improve functional strength to reach overhead   Eval status: 3/5         LT weeks   Pt will be able to improve abduction to over 3+/5 to improve functional reach overhead   Eval status: 2/5   Pt will be able to demonstrate HEP program with no verbal cues for positioning to improve outcome and carry over with HEP   Eval status: Educated upon evaluation   3. Pt will be able to reach overhead to grab 2# objects at shoulder height to mimic functional movement patterns of getting dishes with 0 drops and 0/10 pain   Eval status: Pt unable to perform   4. Patient will improve FOTO score by 10 points to improve functional tolerance for ADL/IADL tasks . Eval Status: FOTO TBD   FOTO score = an established functional score where 100 = no disability      JOSHUA James, OTR/L  CHT  2023 9:03 AM    ______________________________________________________________________  I certify that the above Therapy Services are being furnished while the patient is under my care. I agree with the treatment plan and certify that this therapy is necessary. Physician's Signature:____________Date:_________TIME:________                                      Hiwot Auguste MD  ** Signature, Date and Time must be completed for valid certification **    Please sign and return to :   [facility] [address] Zambrano, [address] [facility] [place]  Ph [phone]  Fx [phone]

## 2023-04-25 ENCOUNTER — TELEPHONE (OUTPATIENT)
Facility: HOSPITAL | Age: 63
End: 2023-04-25

## 2023-04-25 NOTE — TELEPHONE ENCOUNTER
Patient cxl>24 gave no reason, wanted to move her appt to a later time on that same day but therapist is completely booked this entire week

## 2023-04-28 ENCOUNTER — APPOINTMENT (OUTPATIENT)
Facility: HOSPITAL | Age: 63
End: 2023-04-28
Payer: COMMERCIAL

## 2023-05-01 ENCOUNTER — HOSPITAL ENCOUNTER (OUTPATIENT)
Facility: HOSPITAL | Age: 63
Setting detail: RECURRING SERIES
Discharge: HOME | End: 2023-05-04
Payer: COMMERCIAL

## 2023-05-01 PROCEDURE — 97112 NEUROMUSCULAR REEDUCATION: CPT

## 2023-05-01 PROCEDURE — 97016 VASOPNEUMATIC DEVICE THERAPY: CPT

## 2023-05-01 PROCEDURE — 97530 THERAPEUTIC ACTIVITIES: CPT

## 2023-05-01 PROCEDURE — 97110 THERAPEUTIC EXERCISES: CPT

## 2023-05-01 NOTE — PROGRESS NOTES
PHYSICAL / OCCUPATIONAL THERAPY - DAILY TREATMENT NOTE (updated )    Patient Name: Pooja Gooden    Date: 2023    : 1960  Insurance: Payor: Jonas Schaeffer / Plan: Chandan Fox / Product Type: *No Product type* /      Patient  verified Yes     Visit #   Current / Total 4 10   Time   In / Out 230 310   Pain   In / Out 2/10 3/10   Subjective Functional Status/Changes: Pt reports she had a few sleepless nighhs due to increased pain at the shoulder after last session. Changes to:  Meds, Allergies, Med Hx, Sx Hx? If yes, update Summary List no       TREATMENT AREA =  Pain in right shoulder [M25.511]    OBJECTIVE            10 min [x]  Vasopneumatic Device, press/temp:       min []  Bere Sadler / Courtney Bhakta: If using vaso (only need to measure limb vaso being performed on)      pre-treatment girth :   47.9 cm       post-treatment girth : 47.5cm      measured at (landmark location) :    Shoulder/acromion process        Therapeutic Procedures: Tx Min Billable or 1:1 Min (if diff from Tx Min) Procedure, Rationale, Specifics   15  85870 Therapeutic Exercise (timed):  increase ROM, strength, coordination, balance, and proprioception to improve patient's ability to progress to PLOF and address remaining functional goals. (see flow sheet as applicable)     Details if applicable:       15  [id number] Neuromuscular Re-Education (timed):  improve balance, coordination, kinesthetic sense, posture, core stability and proprioception to improve patient's ability to develop conscious control of individual muscles and awareness of position of extremities in order to progress to PLOF and address remaining functional goals.  (see flow sheet as applicable)     Details if applicable:     10  99892 Therapeutic Activity (timed):  use of dynamic activities replicating functional movements to increase ROM, strength, coordination, balance, and proprioception in order to improve patient's ability to progress to PLOF and

## 2023-05-05 ENCOUNTER — HOSPITAL ENCOUNTER (OUTPATIENT)
Facility: HOSPITAL | Age: 63
Setting detail: RECURRING SERIES
Discharge: HOME | End: 2023-05-08
Payer: COMMERCIAL

## 2023-05-05 ENCOUNTER — OFFICE VISIT (OUTPATIENT)
Age: 63
End: 2023-05-05
Payer: COMMERCIAL

## 2023-05-05 VITALS
HEART RATE: 74 BPM | WEIGHT: 243.2 LBS | RESPIRATION RATE: 18 BRPM | TEMPERATURE: 97.5 F | OXYGEN SATURATION: 96 % | BODY MASS INDEX: 38.17 KG/M2 | HEIGHT: 67 IN

## 2023-05-05 DIAGNOSIS — M48.061 SPINAL STENOSIS, LUMBAR REGION WITHOUT NEUROGENIC CLAUDICATION: Primary | ICD-10-CM

## 2023-05-05 DIAGNOSIS — M54.16 LUMBAR RADICULOPATHY: ICD-10-CM

## 2023-05-05 PROCEDURE — 97530 THERAPEUTIC ACTIVITIES: CPT

## 2023-05-05 PROCEDURE — 99213 OFFICE O/P EST LOW 20 MIN: CPT | Performed by: NURSE PRACTITIONER

## 2023-05-05 PROCEDURE — 97112 NEUROMUSCULAR REEDUCATION: CPT

## 2023-05-05 PROCEDURE — 97110 THERAPEUTIC EXERCISES: CPT

## 2023-05-05 PROCEDURE — 97016 VASOPNEUMATIC DEVICE THERAPY: CPT

## 2023-05-05 RX ORDER — METOPROLOL SUCCINATE 25 MG/1
25 TABLET, EXTENDED RELEASE ORAL NIGHTLY
COMMUNITY
Start: 2023-05-01

## 2023-05-05 NOTE — PROGRESS NOTES
Kenney Moses presents today for   Chief Complaint   Patient presents with    Back Problem    Pain    Back Pain       Is someone accompanying this pt? no    Is the patient using any DME equipment during OV? no    Depression Screening:  No flowsheet data found. Learning Assessment:  No flowsheet data found. Abuse Screening:  No flowsheet data found. Fall Risk  No flowsheet data found. OPIOID RISK TOOL  No flowsheet data found. Coordination of Care:  1. Have you been to the ER, urgent care clinic since your last visit? no  Hospitalized since your last visit? no    2. Have you seen or consulted any other health care providers outside of the [address] [place] [address] since your last visit? no Include any pap smears or colon screening.  no

## 2023-05-05 NOTE — PROGRESS NOTES
Chief complaint   Chief Complaint   Patient presents with    Back Problem    Pain    Back Pain       History of Present Illness:  Loreto Turner is a  58 y.o.  female who comes in today stating she is having increased back pain with right buttock pain that radiates down to her foot. It is a sharp shooting pain. She does have terrible stenosis at L3-4 and worse at L4-5. She has had a previous L3-4 epidural steroid injection in 2020 which gave her greater than 50% relief up until now. She would like to have that repeated. She states with the injection she is able to walk better and do activities much easier. Physical Exam: The patient is a 17-year-old female well-developed well-nourished who is alert and oriented with a normal mood and affect. She has a full weightbearing nonantalgic gait. She has 4 out of 5 strength bilateral lower extremities. Negative straight leg raise. She has pain with hyperextension lumbar spine. Assessment and Plan: This is a patient who has known facet arthropathy, stenosis, disc bulges. She is having increased back and right buttock and leg pain down to her foot. I will set her up for repeat L3-4 epidural steroid injection. We will see her back after the injection. Moreno Eddy was seen today for back problem, pain and back pain. Diagnoses and all orders for this visit:    Spinal stenosis, lumbar region without neurogenic claudication  -     Ambulatory Referral to Ortho Injection    Lumbar radiculopathy  -     Ambulatory Referral to Ortho Injection        Return for After the block.  has been . reviewed and is appropriate    Medications:  Current Outpatient Medications   Medication Sig Dispense Refill    metoprolol succinate (TOPROL XL) 25 MG extended release tablet Take 1 tablet by mouth nightly at bedtime      aspirin 81 MG EC tablet Take 1 tablet by mouth daily      vitamin D 25 MCG (1000 UT) CAPS Take 1 capsule by mouth daily      DULoxetine (CYMBALTA) 60 MG

## 2023-05-08 ENCOUNTER — HOSPITAL ENCOUNTER (OUTPATIENT)
Facility: HOSPITAL | Age: 63
Setting detail: RECURRING SERIES
Discharge: HOME | End: 2023-05-11
Payer: COMMERCIAL

## 2023-05-08 PROCEDURE — 97016 VASOPNEUMATIC DEVICE THERAPY: CPT

## 2023-05-08 PROCEDURE — 97112 NEUROMUSCULAR REEDUCATION: CPT

## 2023-05-08 PROCEDURE — 97110 THERAPEUTIC EXERCISES: CPT

## 2023-05-08 NOTE — PROGRESS NOTES
PHYSICAL / OCCUPATIONAL THERAPY - DAILY TREATMENT NOTE (updated )    Patient Name: Emmie Stack    Date: 2023    : 1960  Insurance: Payor: Yuri Garcia / Plan: Malia Clark / Product Type: *No Product type* /      Patient  verified Yes     Visit #   Current / Total 6 10   Time   In / Out 155 235   Pain   In / Out 0 0   Subjective Functional Status/Changes: Pt reports she has no pain today, is feeling great, continues to have night time    Changes to:  Meds, Allergies, Med Hx, Sx Hx? If yes, update Summary List no       TREATMENT AREA =  Pain in right shoulder [M25.511]    OBJECTIVE    10 min [x]  Vasopneumatic Device, press/temp:       min []  Marsha Morataya / Jayleen Pineda: If using vaso (only need to measure limb vaso being performed on)      pre-treatment girth :         post-treatment girth :      measured at (landmark location) :    Shoulder/acromion process         Therapeutic Procedures: Tx Min Billable or 1:1 Min (if diff from Tx Min) Procedure, Rationale, Specifics   20  86199 Therapeutic Exercise (timed):  increase ROM, strength, coordination, balance, and proprioception to improve patient's ability to progress to PLOF and address remaining functional goals. (see flow sheet as applicable)     Details if applicable:       15  48159 Neuromuscular Re-Education (timed):  improve balance, coordination, kinesthetic sense, posture, core stability and proprioception to improve patient's ability to develop conscious control of individual muscles and awareness of position of extremities in order to progress to PLOF and address remaining functional goals.  (see flow sheet as applicable)     Details if applicable:                    36  [facility] Totals Reminder: bill using total billable min of TIMED therapeutic procedures (example: do not include dry needle or estim unattended, both untimed codes, in totals to left)  8-22 min = 1 unit; 23-37 min = 2 units; 38-52 min = 3 units; 53-67 min = 4 units; 68-82

## 2023-05-12 ENCOUNTER — HOSPITAL ENCOUNTER (OUTPATIENT)
Facility: HOSPITAL | Age: 63
Setting detail: RECURRING SERIES
Discharge: HOME | End: 2023-05-15
Payer: COMMERCIAL

## 2023-05-12 PROCEDURE — 97016 VASOPNEUMATIC DEVICE THERAPY: CPT

## 2023-05-12 PROCEDURE — 97110 THERAPEUTIC EXERCISES: CPT

## 2023-05-12 PROCEDURE — 97112 NEUROMUSCULAR REEDUCATION: CPT

## 2023-05-15 ENCOUNTER — HOSPITAL ENCOUNTER (OUTPATIENT)
Facility: HOSPITAL | Age: 63
Setting detail: RECURRING SERIES
Discharge: HOME | End: 2023-05-18
Payer: COMMERCIAL

## 2023-05-15 PROCEDURE — 97110 THERAPEUTIC EXERCISES: CPT

## 2023-05-15 PROCEDURE — 97016 VASOPNEUMATIC DEVICE THERAPY: CPT

## 2023-05-15 PROCEDURE — 97112 NEUROMUSCULAR REEDUCATION: CPT

## 2023-05-15 NOTE — PROGRESS NOTES
PHYSICAL / OCCUPATIONAL THERAPY - DAILY TREATMENT NOTE (updated )    Patient Name: Justin Kong    Date: 5/15/2023    : 1960  Insurance: Payor: Shun Myers / Plan: Bart Salinas / Product Type: *No Product type* /      Patient  verified Yes     Visit #   Current / Total 8 16   Time   In / Out 150 230   Pain   In / Out 0 0   Subjective Functional Status/Changes: Pt reprots she was able to open her kitchen window for the first time without pain    Changes to:  Meds, Allergies, Med Hx, Sx Hx? If yes, update Summary List no       TREATMENT AREA =  Pain in right shoulder [M25.511]    OBJECTIVE    10 min [x]  Vasopneumatic Device, press/temp:       min []  Shilo Lao / Iain Bless: If using vaso (only need to measure limb vaso being performed on)      pre-treatment girth :   48.5cm      post-treatment girth : 48.1cm      measured at (landmark location) :    Shoulder/acromion process        Therapeutic Procedures: Tx Min Billable or 1:1 Min (if diff from Tx Min) Procedure, Rationale, Specifics   15  57979 Therapeutic Exercise (timed):  increase ROM, strength, coordination, balance, and proprioception to improve patient's ability to progress to PLOF and address remaining functional goals. (see flow sheet as applicable)     Details if applicable:       15  93980 Neuromuscular Re-Education (timed):  improve balance, coordination, kinesthetic sense, posture, core stability and proprioception to improve patient's ability to develop conscious control of individual muscles and awareness of position of extremities in order to progress to PLOF and address remaining functional goals.  (see flow sheet as applicable)     Details if applicable:                    36  [facility] Totals Reminder: bill using total billable min of TIMED therapeutic procedures (example: do not include dry needle or estim unattended, both untimed codes, in totals to left)  8-22 min = 1 unit; 23-37 min = 2 units; 38-52 min = 3 units; 53-67 min = 4

## 2023-05-18 ENCOUNTER — TELEPHONE (OUTPATIENT)
Facility: HOSPITAL | Age: 63
End: 2023-05-18

## 2023-05-18 ENCOUNTER — TELEPHONE (OUTPATIENT)
Age: 63
End: 2023-05-18

## 2023-05-18 DIAGNOSIS — F41.9 ANXIETY: Primary | ICD-10-CM

## 2023-05-18 RX ORDER — DIAZEPAM 10 MG/1
TABLET ORAL
Qty: 1 TABLET | Refills: 0 | Status: SHIPPED | OUTPATIENT
Start: 2023-05-18 | End: 2023-05-19

## 2023-05-19 ENCOUNTER — APPOINTMENT (OUTPATIENT)
Facility: HOSPITAL | Age: 63
End: 2023-05-19
Payer: COMMERCIAL

## 2023-05-25 ENCOUNTER — HOSPITAL ENCOUNTER (OUTPATIENT)
Facility: HOSPITAL | Age: 63
Discharge: HOME | End: 2023-05-28

## 2023-05-25 ENCOUNTER — HOSPITAL ENCOUNTER (OUTPATIENT)
Facility: HOSPITAL | Age: 63
Discharge: HOME | End: 2023-05-27

## 2023-05-25 DIAGNOSIS — S46.011A STRAIN OF MUSCLE(S) AND TENDON(S) OF THE ROTATOR CUFF OF RIGHT SHOULDER, INITIAL ENCOUNTER: ICD-10-CM

## 2023-05-25 DIAGNOSIS — Z01.810 PRE-OPERATIVE CARDIOVASCULAR EXAMINATION: ICD-10-CM

## 2023-05-25 LAB
EKG ATRIAL RATE: 74 BPM
EKG DIAGNOSIS: NORMAL
EKG P AXIS: 74 DEGREES
EKG P-R INTERVAL: 160 MS
EKG Q-T INTERVAL: 390 MS
EKG QRS DURATION: 76 MS
EKG QTC CALCULATION (BAZETT): 432 MS
EKG R AXIS: 55 DEGREES
EKG T AXIS: 23 DEGREES
EKG VENTRICULAR RATE: 74 BPM
LABCORP SPECIMEN COLLECTION: NORMAL

## 2023-05-25 RX ORDER — DULOXETIN HYDROCHLORIDE 60 MG/1
60 CAPSULE, DELAYED RELEASE ORAL DAILY
Qty: 30 CAPSULE | Refills: 5 | Status: SHIPPED | OUTPATIENT
Start: 2023-05-25

## 2023-05-25 NOTE — TELEPHONE ENCOUNTER
Requested Prescriptions     Pending Prescriptions Disp Refills    DULoxetine (CYMBALTA) 60 MG extended release capsule 30 capsule 5     Sig: Take 1 capsule by mouth daily        Patient was last seen on 5/5/23. Patient's choice of pharmacy [place], [place]. The refill request's last refill info   DULoxetine (CYMBALTA) 60 mg capsule 90 Capsule 1 8/9/2022     Sig - Route: Take 1 Capsule by mouth nightly.  - Oral    Sent to pharmacy as: DULoxetine 60 mg capsule,delayed release (CYMBALTA)    E-Prescribing Status: Receipt confirmed by pharmacy (8/9/2022  9:23 AM EDT)

## 2023-05-26 ENCOUNTER — OFFICE VISIT (OUTPATIENT)
Age: 63
End: 2023-05-26

## 2023-05-26 VITALS
BODY MASS INDEX: 37.57 KG/M2 | SYSTOLIC BLOOD PRESSURE: 150 MMHG | DIASTOLIC BLOOD PRESSURE: 82 MMHG | RESPIRATION RATE: 18 BRPM | WEIGHT: 239.4 LBS | HEIGHT: 67 IN

## 2023-05-26 DIAGNOSIS — M75.121 NONTRAUMATIC COMPLETE TEAR OF RIGHT ROTATOR CUFF: Primary | ICD-10-CM

## 2023-05-26 LAB
ALBUMIN SERPL-MCNC: 4.7 G/DL (ref 3.8–4.8)
ALBUMIN/GLOB SERPL: 2 {RATIO} (ref 1.2–2.2)
ALP SERPL-CCNC: 72 IU/L (ref 44–121)
ALT SERPL-CCNC: 20 IU/L (ref 0–32)
AST SERPL-CCNC: 20 IU/L (ref 0–40)
BASOPHILS # BLD AUTO: 0.1 X10E3/UL (ref 0–0.2)
BASOPHILS NFR BLD AUTO: 1 %
BILIRUB SERPL-MCNC: 0.6 MG/DL (ref 0–1.2)
BUN SERPL-MCNC: 16 MG/DL (ref 8–27)
BUN/CREAT SERPL: 21 (ref 12–28)
CALCIUM SERPL-MCNC: 10 MG/DL (ref 8.7–10.3)
CHLORIDE SERPL-SCNC: 102 MMOL/L (ref 96–106)
CO2 SERPL-SCNC: 25 MMOL/L (ref 20–29)
CREAT SERPL-MCNC: 0.77 MG/DL (ref 0.57–1)
EGFRCR SERPLBLD CKD-EPI 2021: 87 ML/MIN/1.73
EOSINOPHIL # BLD AUTO: 0 X10E3/UL (ref 0–0.4)
EOSINOPHIL NFR BLD AUTO: 0 %
ERYTHROCYTE [DISTWIDTH] IN BLOOD BY AUTOMATED COUNT: 14.2 % (ref 11.7–15.4)
GLOBULIN SER CALC-MCNC: 2.4 G/DL (ref 1.5–4.5)
GLUCOSE SERPL-MCNC: 119 MG/DL (ref 70–99)
HBA1C MFR BLD: 8.3 % (ref 4.8–5.6)
HCT VFR BLD AUTO: 36.6 % (ref 34–46.6)
HGB BLD-MCNC: 12.1 G/DL (ref 11.1–15.9)
IMM GRANULOCYTES # BLD AUTO: 0 X10E3/UL (ref 0–0.1)
IMM GRANULOCYTES NFR BLD AUTO: 0 %
LYMPHOCYTES # BLD AUTO: 4.5 X10E3/UL (ref 0.7–3.1)
LYMPHOCYTES NFR BLD AUTO: 55 %
MCH RBC QN AUTO: 27.7 PG (ref 26.6–33)
MCHC RBC AUTO-ENTMCNC: 33.1 G/DL (ref 31.5–35.7)
MCV RBC AUTO: 84 FL (ref 79–97)
MONOCYTES # BLD AUTO: 0.4 X10E3/UL (ref 0.1–0.9)
MONOCYTES NFR BLD AUTO: 5 %
NEUTROPHILS # BLD AUTO: 3.2 X10E3/UL (ref 1.4–7)
NEUTROPHILS NFR BLD AUTO: 39 %
PLATELET # BLD AUTO: 266 X10E3/UL (ref 150–450)
POTASSIUM SERPL-SCNC: 4.1 MMOL/L (ref 3.5–5.2)
PROT SERPL-MCNC: 7.1 G/DL (ref 6–8.5)
RBC # BLD AUTO: 4.37 X10E6/UL (ref 3.77–5.28)
SODIUM SERPL-SCNC: 143 MMOL/L (ref 134–144)
SPECIMEN STATUS REPORT: NORMAL
WBC # BLD AUTO: 8.2 X10E3/UL (ref 3.4–10.8)

## 2023-05-26 PROCEDURE — 99024 POSTOP FOLLOW-UP VISIT: CPT | Performed by: ORTHOPAEDIC SURGERY

## 2023-05-26 RX ORDER — HYDROCODONE BITARTRATE AND ACETAMINOPHEN 5; 325 MG/1; MG/1
1 TABLET ORAL EVERY 4 HOURS PRN
Qty: 42 TABLET | Refills: 0 | Status: SHIPPED | OUTPATIENT
Start: 2023-05-26 | End: 2023-06-02

## 2023-05-26 NOTE — PROGRESS NOTES
losartan-hydroCHLOROthiazide (HYZAAR) 100-25 MG per tablet Take 1 tablet by mouth daily      magnesium oxide (MAG-OX) 400 MG tablet Take 1 tablet by mouth daily      metFORMIN (GLUCOPHAGE) 1000 MG tablet Take 1 tablet by mouth 2 times daily (with meals)      phentermine (ADIPEX-P) 37.5 MG tablet Take 1 tablet by mouth daily. pregabalin (LYRICA) 225 MG capsule Take 1 capsule by mouth 2 times daily. rosuvastatin (CRESTOR) 20 MG tablet Take 1 tablet by mouth daily      triamcinolone (ARISTOCORT) 0.5 % cream 2 times daily       No current facility-administered medications for this visit. Allergies   Allergen Reactions    Lisinopril      Other reaction(s): Unable to Obtain    Oxaprozin      Other reaction(s): Unknown (comments)    Pioglitazone      Other reaction(s): Unable to Obtain    Pioglitazone Hcl-Metformin Hcl Swelling    Simvastatin Myalgia    Topiramate Other (See Comments)     Unknown reaction       Past Surgical History:   Procedure Laterality Date    BREAST LUMPECTOMY      HYSTERECTOMY (CERVIX STATUS UNKNOWN)      OTHER SURGICAL HISTORY Left     wrist tendon release    TONSILLECTOMY         Social History     Socioeconomic History    Marital status:      Spouse name: Not on file    Number of children: Not on file    Years of education: Not on file    Highest education level: Not on file   Occupational History    Not on file   Tobacco Use    Smoking status: Never    Smokeless tobacco: Never   Substance and Sexual Activity    Alcohol use:  Yes     Alcohol/week: 0.8 standard drinks    Drug use: No    Sexual activity: Not on file   Other Topics Concern    Not on file   Social History Narrative    Not on file     Social Determinants of Health     Financial Resource Strain: Not on file   Food Insecurity: Not on file   Transportation Needs: Not on file   Physical Activity: Unknown    Days of Exercise per Week: 2 days    Minutes of Exercise per Session: Not on file   Stress: Not on file   Social

## 2023-05-30 LAB
EKG ATRIAL RATE: 74 BPM
EKG DIAGNOSIS: NORMAL
EKG P AXIS: 74 DEGREES
EKG P-R INTERVAL: 160 MS
EKG Q-T INTERVAL: 390 MS
EKG QRS DURATION: 76 MS
EKG QTC CALCULATION (BAZETT): 432 MS
EKG R AXIS: 55 DEGREES
EKG T AXIS: 23 DEGREES
EKG VENTRICULAR RATE: 74 BPM

## 2023-06-01 ENCOUNTER — TELEPHONE (OUTPATIENT)
Facility: HOSPITAL | Age: 63
End: 2023-06-01

## 2023-07-12 ENCOUNTER — OFFICE VISIT (OUTPATIENT)
Age: 63
End: 2023-07-12

## 2023-07-12 DIAGNOSIS — M75.121 NONTRAUMATIC COMPLETE TEAR OF RIGHT ROTATOR CUFF: Primary | ICD-10-CM

## 2023-07-12 PROCEDURE — 99024 POSTOP FOLLOW-UP VISIT: CPT | Performed by: ORTHOPAEDIC SURGERY

## 2023-07-12 NOTE — PROGRESS NOTES
ophthalmic solution INSTILL 1 DROP INTO RIGHT EYE THREE TIMES DAILY      losartan-hydroCHLOROthiazide (HYZAAR) 100-25 MG per tablet Take 1 tablet by mouth daily      magnesium oxide (MAG-OX) 400 MG tablet Take 1 tablet by mouth daily      phentermine (ADIPEX-P) 37.5 MG tablet Take 1 tablet by mouth daily. (Patient not taking: Reported on 6/13/2023)      rosuvastatin (CRESTOR) 20 MG tablet Take 1 tablet by mouth daily      triamcinolone (ARISTOCORT) 0.5 % cream 2 times daily       No current facility-administered medications for this visit. Allergies   Allergen Reactions    Lisinopril      Other reaction(s): Unable to Obtain    Oxaprozin      Other reaction(s): Unknown (comments)    Pioglitazone      Other reaction(s): Unable to Obtain    Pioglitazone Hcl-Metformin Hcl Swelling    Simvastatin Myalgia    Topiramate Other (See Comments)     Unknown reaction       Past Surgical History:   Procedure Laterality Date    BREAST LUMPECTOMY      HYSTERECTOMY (CERVIX STATUS UNKNOWN)      OTHER SURGICAL HISTORY Left     wrist tendon release    TONSILLECTOMY         Social History     Socioeconomic History    Marital status:      Spouse name: Not on file    Number of children: Not on file    Years of education: Not on file    Highest education level: Not on file   Occupational History    Not on file   Tobacco Use    Smoking status: Never    Smokeless tobacco: Never   Substance and Sexual Activity    Alcohol use:  Yes     Alcohol/week: 0.8 standard drinks    Drug use: No    Sexual activity: Not on file   Other Topics Concern    Not on file   Social History Narrative    Not on file     Social Determinants of Health     Financial Resource Strain: Not on file   Food Insecurity: Not on file   Transportation Needs: Not on file   Physical Activity: Unknown    Days of Exercise per Week: 2 days    Minutes of Exercise per Session: Not on file   Stress: Not on file   Social Connections: Not on file   Intimate Partner Violence:

## 2023-07-20 ENCOUNTER — TELEPHONE (OUTPATIENT)
Age: 63
End: 2023-07-20

## 2023-07-20 NOTE — TELEPHONE ENCOUNTER
Patient would like to have a copy of her return to work note uploaded to her my chart account.         [phone]

## 2023-07-20 NOTE — TELEPHONE ENCOUNTER
Patient called stating that [facility] has not received her PT referral.

## 2023-07-25 ENCOUNTER — HOSPITAL ENCOUNTER (OUTPATIENT)
Facility: HOSPITAL | Age: 63
Setting detail: RECURRING SERIES
Discharge: HOME | End: 2023-07-28
Payer: COMMERCIAL

## 2023-07-25 PROCEDURE — 97535 SELF CARE MNGMENT TRAINING: CPT

## 2023-07-25 PROCEDURE — 97161 PT EVAL LOW COMPLEX 20 MIN: CPT

## 2023-07-25 NOTE — PROGRESS NOTES
[facility] [address] Manav Bauer, [address] [facility]  Ph [phone]  Fx [phone]    Plan of Care/ Statement of Necessity for Physical Therapy Services      Patient name: Shana Back Start of Care: 2023   Referral source: Mirza Olivo MD : 1960    Medical Diagnosis: Pain in right shoulder [M25.511]   Onset Date:7/15/22    Treatment Diagnosis: M25.511  RIGHT SHOULDER PAIN     Prior Hospitalization: see medical history Provider#: [id number]   Medications: Verified on Patient summary List   Comorbidities: vestibular problems, chronic ankle pain, HTN, Type 2 Diabetes, chronic right shoulder pain, bilateral knee pain and chronic LBP  Prior Level of Function: functionally independent, no AD, active lifestyle with travelling, walking, biking, hiking, swimming, arts and crafts      The Plan of Care and following information is based on the information from the initial evaluation. Assessment/ key information: 62 yo female who presents to [facility] s/p right rotator cuff repair on 23. Patient reports fall last summer on [employer] in [place] on 7/15/22 which resulted in increased shoulder pain that went away. In December pain increased in her shoulder so patient went to the MD and found out they had a torn rotator cuff. Patient has been out of sling for the past 2 weeks. Patient was educated on importance of compliance with HEP due to being at increased risk of Adhesive Capsulitis. Please perform PROM at next visit for right shoulder. Patient demonstrates decreased ROM, decreased strength, impaired posture, pain and decreased functional mobility tolerance.      Patient will continue to benefit from skilled PT services to modify and progress therapeutic interventions, address functional mobility deficits, address ROM deficits, address strength deficits, analyze and address soft tissue restrictions, analyze and cue movement patterns, analyze and modify body
considered significant and having an impact on the patient's strength, balance, gait, transfers, self care, and ADLs           Strength (MMT):  Shoulder Left (1-5) Right (1-5)   Shoulder Flexion 4+ Not tested due to recent surgical procedure   Shoulder ABD 5 Not tested due to recent surgical procedure   Shoulder IR 4+ Not tested due to recent surgical procedure   Shoulder ER 4+ Not tested due to recent surgical procedure                                              Other Tests / Comments:       Pain Level (0-10 scale) post treatment: 2/10    ASSESSMENT/Changes in Function: 62 yo female who presents to [facility] s/p right rotator cuff repair on 6/8/23. Patient reports fall last summer on [employer] in [place] on 7/15/22 which resulted in increased shoulder pain that went away. In December pain increased in her shoulder so patient went to the MD and found out they had a torn rotator cuff. Patient has been out of sling for the past 2 weeks. Patient was educated on importance of compliance with HEP due to being at increased risk of Adhesive Capsulitis. Please perform PROM at next visit for right shoulder. Patient demonstrates decreased ROM, decreased strength, impaired posture, pain and decreased functional mobility tolerance. Patient will continue to benefit from skilled PT services to modify and progress therapeutic interventions, address functional mobility deficits, address ROM deficits, address strength deficits, analyze and address soft tissue restrictions, analyze and cue movement patterns, analyze and modify body mechanics/ergonomics, assess and modify postural abnormalities, and instruct in home and community integration to attain remaining goals. [x]  See Plan of Care  []  See progress note/recertification  []  See Discharge Summary         Progress towards goals / Updated goals:  Short Term Goals:  To be accomplished in 8 treatments:  Patient will be independent and compliant with HEP to progress

## 2023-07-31 ENCOUNTER — HOSPITAL ENCOUNTER (OUTPATIENT)
Facility: HOSPITAL | Age: 63
Setting detail: RECURRING SERIES
Discharge: HOME | End: 2023-08-03
Payer: COMMERCIAL

## 2023-07-31 PROCEDURE — 97112 NEUROMUSCULAR REEDUCATION: CPT

## 2023-07-31 PROCEDURE — 97016 VASOPNEUMATIC DEVICE THERAPY: CPT

## 2023-07-31 PROCEDURE — 97110 THERAPEUTIC EXERCISES: CPT

## 2023-07-31 PROCEDURE — 97530 THERAPEUTIC ACTIVITIES: CPT

## 2023-07-31 NOTE — PROGRESS NOTES
outlined in goals below. PROM measured at   Shoulder Left  Right   Shoulder Flexion  80deg   Shoulder Extension  NT   Shoulder ABD  82deg   Shoulder ADD  NT   Shoulder IR  30deg   Shoulder ER  35deg                                                Patient will continue to benefit from skilled PT / OT services to modify and progress therapeutic interventions, analyze and address functional mobility deficits, analyze and address ROM deficits, analyze and address strength deficits, analyze and address soft tissue restrictions, analyze and cue for proper movement patterns, and analyze and modify for postural abnormalities to address functional deficits and attain remaining goals. Progress toward goals / Updated goals:  []  See Progress Note/Recertification    Short Term Goals: To be accomplished in 8 treatments:  Patient will be independent and compliant with HEP to progress toward goals and restore functional mobility. Eval Status: to be issued at future visit     Patient will improve FOTO score by 10 points in order to maximize function and promote patient satisfaction with overall outcome   Eval Status: FOTO 45  FOTO score = an established functional score where 100 = no disability     Patient will improve pain in right shoulder to 5/10  to improve pain tolerance and restore prior level of function to reach up into a shelf. Eval Status: 10/10 at worst  Current: 2/10 today 7/31/23     Pt will have painfree shoulder flexion and abduction AROM 0-120 to aid in functional mechanics for ADLs. Eval Status:                          AROM                            AAROM           Shoulder Left Right Left Right   Flexion WNL 85 degs with pain and upper trap hike   93 degs with pain and upper trap hike   ABD WNL 90 degs with pain and upper trap hike   108 degs with pain and upper trap hike   ER T4 42 degs with pain   50 degs with pain   IR T7 L3-4             Long Term Goals:  To be accomplished in 16

## 2023-08-02 ENCOUNTER — HOSPITAL ENCOUNTER (OUTPATIENT)
Facility: HOSPITAL | Age: 63
Setting detail: RECURRING SERIES
Discharge: HOME | End: 2023-08-05
Payer: COMMERCIAL

## 2023-08-02 PROCEDURE — 97016 VASOPNEUMATIC DEVICE THERAPY: CPT

## 2023-08-02 PROCEDURE — 97110 THERAPEUTIC EXERCISES: CPT

## 2023-08-02 PROCEDURE — 97530 THERAPEUTIC ACTIVITIES: CPT

## 2023-08-02 NOTE — PROGRESS NOTES
PHYSICAL / OCCUPATIONAL THERAPY - DAILY TREATMENT NOTE (updated )    Patient Name: Eliza Delcid    Date: 2023    : 1960  Insurance: Payor: [facility] / Plan: Annabelle Root / Product Type: *No Product type* /      Patient  verified Yes     Visit #   Current / Total 3 16   Time   In / Out 3:10 4:04   Pain   In / Out 3/10 0/10   Subjective Functional Status/Changes: \"Just some pain today. I had more last night. \"   Changes to: Allergies, Med Hx, Sx Hx?   no       TREATMENT AREA =  Pain in right shoulder [M25.511]    OBJECTIVE    Modalities Rationale:     decrease edema, decrease inflammation, and decrease pain to improve patient's ability to progress to PLOF and address remaining functional goals. min [] Estim Unattended, type/location:                                      []  w/ice    []  w/heat    min [] Estim Attended, type/location:                                     []  w/US     []  w/ice    []  w/heat    []  TENS insruct      min []  Mechanical Traction: type/lbs                   []  pro   []  sup   []  int   []  cont    []  before manual    []  after manual    min []  Ultrasound, settings/location:      min []  Iontophoresis w/ dexamethasone, location:                                               []  take home patch       []  in clinic        min  unbilled []  Ice     []  Heat    location/position:     min []  Paraffin,  details:    10 min []  Vasopneumatic Device, press/temp: Med/low    min []  Carmen Mcgarry / Belen Lasso: If using vaso (only need to measure limb vaso being performed on)      pre-treatment girth : 53 cm      post-treatment girth : 51 cm      measured at (landmark location) :  Acromion    min []  Other:    Skin assessment post-treatment (if applicable):    [x]  intact    []  redness- no adverse reaction                 []redness - adverse reaction:         Therapeutic Procedures:   Tx Min Billable or 1:1 Min (if diff from Tx Min) Procedure, Rationale, Specifics   [phone] [id number]

## 2023-08-08 ENCOUNTER — HOSPITAL ENCOUNTER (OUTPATIENT)
Facility: HOSPITAL | Age: 63
Setting detail: RECURRING SERIES
Discharge: HOME | End: 2023-08-11
Payer: COMMERCIAL

## 2023-08-08 PROCEDURE — 97110 THERAPEUTIC EXERCISES: CPT

## 2023-08-08 PROCEDURE — 97016 VASOPNEUMATIC DEVICE THERAPY: CPT

## 2023-08-08 PROCEDURE — 97530 THERAPEUTIC ACTIVITIES: CPT

## 2023-08-08 PROCEDURE — 97112 NEUROMUSCULAR REEDUCATION: CPT

## 2023-08-08 NOTE — PROGRESS NOTES
trap hike     8/8/23, progressing     Long Term Goals: To be accomplished in 16 treatments:  Patient will improve FOTO score by 20 points  in order to maximize function and promote patient satisfaction with overall outcome . Eval Status: FOTO 45  FOTO score = an established functional score where 100 = no disability     Pt will have painfree UE AROM WFL to aid in functional mechanics for ADLs. Eval Status:                                        AROM                                  AAROM           Shoulder Left Right Left Right   Flexion WNL 85 degs with pain and upper trap hike   93 degs with pain and upper trap hike   ABD WNL 90 degs with pain and upper trap hike   108 degs with pain and upper trap hike   ER T4 42 degs with pain   50 degs with pain   IR T7 L3-4             Pt will have bilateral UE 5/5 strength to return to goals of carrying groceries. Eval Status:   Shoulder Left (1-5) Right (1-5)   Shoulder Flexion 4+ Not tested due to recent surgical procedure   Shoulder ABD 5 Not tested due to recent surgical procedure   Shoulder IR 4+ Not tested due to recent surgical procedure   Shoulder ER 4+ Not tested due to recent surgical procedure         Patient will improve pain in right shoulder to 2/10 at worst to improve pain tolerance and restore prior level of function to increase sleep tolerance.   Eval Status: 10/10 at worst      PLAN  Yes  Continue plan of care  []  Upgrade activities as tolerated  []  Discharge due to :  []  Other:    Corinne Landsman, PT    8/8/2023    2:53 PM    Future Appointments   Date Time Provider [address]   8/10/2023 11:15 AM GINNY Galvan BS AMB   8/10/2023  3:10 PM Eliza Parada [facility]   8/11/2023  2:30 PM MD TORI Anand BS AMB   8/18/2023  1:10 PM Corinne Landsman, PT [facility]   8/21/2023  1:50 PM Eliza Parada PTA [facility]   8/23/2023  1:00 PM MD RITA Ramirez BS AMB   8/24/2023 12:30 PM Mariaa Hussein PT

## 2023-08-10 ENCOUNTER — TELEPHONE (OUTPATIENT)
Facility: HOSPITAL | Age: 63
End: 2023-08-10

## 2023-08-10 ENCOUNTER — OFFICE VISIT (OUTPATIENT)
Age: 63
End: 2023-08-10
Payer: COMMERCIAL

## 2023-08-10 ENCOUNTER — HOSPITAL ENCOUNTER (OUTPATIENT)
Facility: HOSPITAL | Age: 63
Setting detail: RECURRING SERIES
End: 2023-08-10
Payer: COMMERCIAL

## 2023-08-10 VITALS — HEIGHT: 67 IN | WEIGHT: 240 LBS | BODY MASS INDEX: 37.67 KG/M2

## 2023-08-10 DIAGNOSIS — M25.562 PAIN IN BOTH KNEES, UNSPECIFIED CHRONICITY: Primary | ICD-10-CM

## 2023-08-10 DIAGNOSIS — M17.12 PRIMARY OSTEOARTHRITIS OF LEFT KNEE: ICD-10-CM

## 2023-08-10 DIAGNOSIS — M25.561 PAIN IN BOTH KNEES, UNSPECIFIED CHRONICITY: Primary | ICD-10-CM

## 2023-08-10 DIAGNOSIS — M17.11 PRIMARY OSTEOARTHRITIS OF RIGHT KNEE: ICD-10-CM

## 2023-08-10 PROCEDURE — 20611 DRAIN/INJ JOINT/BURSA W/US: CPT | Performed by: PHYSICIAN ASSISTANT

## 2023-08-10 PROCEDURE — 99213 OFFICE O/P EST LOW 20 MIN: CPT | Performed by: PHYSICIAN ASSISTANT

## 2023-08-10 PROCEDURE — 73560 X-RAY EXAM OF KNEE 1 OR 2: CPT | Performed by: PHYSICIAN ASSISTANT

## 2023-08-10 RX ORDER — TRIAMCINOLONE ACETONIDE 40 MG/ML
40 INJECTION, SUSPENSION INTRA-ARTICULAR; INTRAMUSCULAR ONCE
Status: COMPLETED | OUTPATIENT
Start: 2023-08-10 | End: 2023-08-10

## 2023-08-10 RX ADMIN — TRIAMCINOLONE ACETONIDE 40 MG: 40 INJECTION, SUSPENSION INTRA-ARTICULAR; INTRAMUSCULAR at 11:49

## 2023-08-10 NOTE — PROGRESS NOTES
Michael Martines  1960   Chief Complaint   Patient presents with    Knee Pain     BILAT         HISTORY OF PRESENT ILLNESS  Michael Martines is a 61 y.o. female who presents today for evaluation of bilateral knee pain. She states this pain has been present for years. She notes that she has had previous arthroscopies on both knees. She has had viscosupplementation in the past greater than a year ago that has helped significantly. Last cortisone injection was over 4 months ago. Last seen in [address] but had to change physicians secondary to insurance. Today her pain is increasing. . Pain is a 8/10.    Has tried following treatments: Injections:Yes; Brace:No; Therapy:No; Cane/Crutch:Yes      Allergies   Allergen Reactions    Lisinopril      Other reaction(s): Unable to Obtain    Oxaprozin      Other reaction(s): Unknown (comments)    Pioglitazone      Other reaction(s): Unable to Obtain    Pioglitazone Hcl-Metformin Hcl Swelling    Simvastatin Myalgia    Topiramate Other (See Comments)     Unknown reaction        Past Medical History:   Diagnosis Date    Arthritis     Cervical radiculopathy     Closed fracture of right hand with routine healing 07/2022    Diabetes ([address])     Hypercholesteremia     Hypertension       Social History       Tobacco History       Smoking Status  Never      Smokeless Tobacco Use  Never              Alcohol History       Alcohol Use Status  Yes Amount  0.8 standard drinks of alcohol/wk              Drug Use       Drug Use Status  No              Sexual Activity       Sexually Active  Not Asked                   Past Surgical History:   Procedure Laterality Date    BREAST LUMPECTOMY      HYSTERECTOMY (CERVIX STATUS UNKNOWN)      OTHER SURGICAL HISTORY Left     wrist tendon release    TONSILLECTOMY        Family History   Problem Relation Age of Onset    Diabetes Other     Other Other         Connective tissue disorder    Cancer Other      Current Outpatient Medications

## 2023-08-11 ENCOUNTER — HOSPITAL ENCOUNTER (OUTPATIENT)
Facility: HOSPITAL | Age: 63
Setting detail: RECURRING SERIES
Discharge: HOME | End: 2023-08-14
Payer: COMMERCIAL

## 2023-08-11 ENCOUNTER — OFFICE VISIT (OUTPATIENT)
Age: 63
End: 2023-08-11
Payer: COMMERCIAL

## 2023-08-11 VITALS — OXYGEN SATURATION: 96 % | BODY MASS INDEX: 37.59 KG/M2 | TEMPERATURE: 97.3 F | HEART RATE: 75 BPM | HEIGHT: 67 IN

## 2023-08-11 DIAGNOSIS — M48.061 SPINAL STENOSIS, LUMBAR REGION WITHOUT NEUROGENIC CLAUDICATION: Primary | ICD-10-CM

## 2023-08-11 DIAGNOSIS — M51.36 DDD (DEGENERATIVE DISC DISEASE), LUMBAR: ICD-10-CM

## 2023-08-11 DIAGNOSIS — M54.16 LUMBAR RADICULOPATHY: ICD-10-CM

## 2023-08-11 PROCEDURE — 97016 VASOPNEUMATIC DEVICE THERAPY: CPT

## 2023-08-11 PROCEDURE — 97110 THERAPEUTIC EXERCISES: CPT

## 2023-08-11 PROCEDURE — 97530 THERAPEUTIC ACTIVITIES: CPT

## 2023-08-11 PROCEDURE — 99213 OFFICE O/P EST LOW 20 MIN: CPT | Performed by: PHYSICAL MEDICINE & REHABILITATION

## 2023-08-11 NOTE — PROGRESS NOTES
Sindy Chawla MD [facility] BS AMB   8/24/2023 12:30 PM Abbi Kelly, [facility]   8/29/2023  3:10 PM Domenica Owusu, [facility]   8/31/2023 12:30 PM Abbi Kelly, [facility]   9/5/2023  1:50 PM Dolores Swan, [facility]   9/7/2023  1:10 PM Dolores Swan, [facility]   9/12/2023  1:50 PM Yvon Deluca, [facility]   9/14/2023  1:10 PM Siena Stanley, [facility]   9/19/2023  1:10 PM Yvon Deluca [facility]   9/21/2023  1:10 PM Yvon Deluca [facility]   12/6/2023 10:20 AM ELIZABETH Muller NP [facility] BS AMB

## 2023-08-11 NOTE — PROGRESS NOTES
2021. I told her to go to the ER if she experiences bowel or bladder incontinence. I told her to call the office if she is experiencing BLE weakness. Patient is neurologically intact. Patient has a follow up appointment with Eliana Bucio NP on 12/6/2023. I told her to follow up with Eliana Bucio NP as scheduled, or earlier with either me or Eliana Bucio NP if needed. Written by Gamaliel Rogers, as dictated by Odilon Garcia MD  I examined the patient, reviewed and agree with the note.

## 2023-08-14 ENCOUNTER — APPOINTMENT (OUTPATIENT)
Facility: HOSPITAL | Age: 63
End: 2023-08-14
Payer: COMMERCIAL

## 2023-08-18 ENCOUNTER — HOSPITAL ENCOUNTER (OUTPATIENT)
Facility: HOSPITAL | Age: 63
Setting detail: RECURRING SERIES
Discharge: HOME | End: 2023-08-21
Payer: COMMERCIAL

## 2023-08-18 PROCEDURE — 97110 THERAPEUTIC EXERCISES: CPT

## 2023-08-18 PROCEDURE — 97530 THERAPEUTIC ACTIVITIES: CPT

## 2023-08-18 PROCEDURE — 97016 VASOPNEUMATIC DEVICE THERAPY: CPT

## 2023-08-18 PROCEDURE — 97535 SELF CARE MNGMENT TRAINING: CPT

## 2023-08-18 NOTE — PROGRESS NOTES
PHYSICAL / OCCUPATIONAL THERAPY - DAILY TREATMENT NOTE (updated )    Patient Name: Joseph Shelby    Date: 2023    : 1960  Insurance: Payor: Alyssa Sorto / Plan: Tara Storey / Product Type: *No Product type* /      Patient  verified Yes     Visit #   Current / Total 6 16   Time   In / Out 9:55 10:53   Pain   In / Out 0/10 0/10   Subjective Functional Status/Changes: \"I don't have any pain right now. \"   Changes to: Allergies, Med Hx, Sx Hx?   no       TREATMENT AREA =  Pain in right shoulder [M25.511]    OBJECTIVE    Modalities Rationale:     decrease edema, decrease inflammation, and decrease pain to improve patient's ability to progress to PLOF and address remaining functional goals. min [] Estim Unattended, type/location:                                      []  w/ice    []  w/heat    min [] Estim Attended, type/location:                                     []  w/US     []  w/ice    []  w/heat    []  TENS insruct      min []  Mechanical Traction: type/lbs                   []  pro   []  sup   []  int   []  cont    []  before manual    []  after manual    min []  Ultrasound, settings/location:      min []  Iontophoresis w/ dexamethasone, location:                                               []  take home patch       []  in clinic        min  unbilled []  Ice     []  Heat    location/position:     min []  Paraffin,  details:    10 min [x]  Vasopneumatic Device, press/temp: Med/low    min []  González Blackwood / Elvin Dayhoff: If using vaso (only need to measure limb vaso being performed on)      pre-treatment girth : 51 cm      post-treatment girth : 48 cm      measured at (landmark location) :  Acromion    min []  Other:    Skin assessment post-treatment (if applicable):    [x]  intact    []  redness- no adverse reaction                 []redness - adverse reaction:         Therapeutic Procedures:   Tx Min Billable or 1:1 Min (if diff from Tx Min) Procedure, Rationale, Specifics   [phone]

## 2023-08-21 ENCOUNTER — HOSPITAL ENCOUNTER (OUTPATIENT)
Facility: HOSPITAL | Age: 63
Setting detail: RECURRING SERIES
Discharge: HOME | End: 2023-08-24
Payer: COMMERCIAL

## 2023-08-21 PROCEDURE — 97530 THERAPEUTIC ACTIVITIES: CPT

## 2023-08-21 PROCEDURE — 97535 SELF CARE MNGMENT TRAINING: CPT

## 2023-08-21 PROCEDURE — 97016 VASOPNEUMATIC DEVICE THERAPY: CPT

## 2023-08-21 PROCEDURE — 97110 THERAPEUTIC EXERCISES: CPT

## 2023-08-21 NOTE — PROGRESS NOTES
PHYSICAL / OCCUPATIONAL THERAPY - DAILY TREATMENT NOTE (updated )    Patient Name: Doreen Martinez    Date: 2023    : 1960  Insurance: Payor: Reba Cover / Plan: Elizabeth Bradshaw / Product Type: *No Product type* /      Patient  verified Yes     Visit #   Current / Total 7 16   Time   In / Out 1:50 2:52   Pain   In / Out 0/10 0/10   Subjective Functional Status/Changes: \"I don't have any pain and I haven't had any pain in the past week. \"   Changes to: Allergies, Med Hx, Sx Hx?   no       TREATMENT AREA =  Pain in right shoulder [M25.511]    OBJECTIVE    Modalities Rationale:     decrease edema, decrease inflammation, and decrease pain to improve patient's ability to progress to PLOF and address remaining functional goals. min [] Estim Unattended, type/location:                                      []  w/ice    []  w/heat    min [] Estim Attended, type/location:                                     []  w/US     []  w/ice    []  w/heat    []  TENS insruct      min []  Mechanical Traction: type/lbs                   []  pro   []  sup   []  int   []  cont    []  before manual    []  after manual    min []  Ultrasound, settings/location:      min []  Iontophoresis w/ dexamethasone, location:                                               []  take home patch       []  in clinic        min  unbilled []  Ice     []  Heat    location/position:     min []  Paraffin,  details:    10 min [x]  Vasopneumatic Device, press/temp: Med/low    min []  Mary Garces / Cassy Martinez: If using vaso (only need to measure limb vaso being performed on)      pre-treatment girth : 48 cm      post-treatment girth : 47.5 cm      measured at (landmark location) : Acromion     min []  Other:    Skin assessment post-treatment (if applicable):    [x]  intact    []  redness- no adverse reaction                 []redness - adverse reaction:         Therapeutic Procedures:   Tx Min Billable or 1:1 Min (if diff from [employer]) Procedure,
upper trap hike   108 degs with pain and upper trap hike   ER T4 42 degs with pain   50 degs with pain   IR T7 L3-4          Current: AROM: flexion to 110 deg, abduction 108 degs;    ER: C7, IR[de-identified] L1 8/21/23 Progressing       Long Term Goals: To be accomplished in 16 treatments:  Patient will improve FOTO score by 20 points  in order to maximize function and promote patient satisfaction with overall outcome . Eval Status: FOTO 45  Current: FOTO 57 8/18/23 Progressing  FOTO score = an established functional score where 100 = no disability     Pt will have painfree UE AROM WFL to aid in functional mechanics for ADLs. Eval Status:                                        AROM                                  AAROM           Shoulder Left Right Left Right   Flexion WNL 85 degs with pain and upper trap hike   93 degs with pain and upper trap hike   ABD WNL 90 degs with pain and upper trap hike   108 degs with pain and upper trap hike   ER T4 42 degs with pain   50 degs with pain   IR T7 L3-4        Current: AROM: flexion to 110 degs, abduction 108 degs;    ER: C7, IR[de-identified] L1 8/21/23 Progressing       Pt will have bilateral UE 5/5 strength to return to goals of carrying groceries. Eval Status:   Shoulder Left (1-5) Right (1-5)   Shoulder Flexion 4+ Not tested due to recent surgical procedure   Shoulder ABD 5 Not tested due to recent surgical procedure   Shoulder IR 4+ Not tested due to recent surgical procedure   Shoulder ER 4+ Not tested due to recent surgical procedure    Current: Limited to full test per protocol Grossly 3-/5 8/21/23 Progressing     Patient will improve pain in right shoulder to 2/10 at worst to improve pain tolerance and restore prior level of function to increase sleep tolerance.   Eval Status: 10/10 at worst  Current: 0/10 at worst in Right Shoulder in the past week 8/21/23 Progressing      Summary of Care/ Key Functional Changes: Pt has attended 7 visits of physical therapy for Right Shoulder pain

## 2023-08-23 ENCOUNTER — OFFICE VISIT (OUTPATIENT)
Age: 63
End: 2023-08-23

## 2023-08-23 VITALS — WEIGHT: 240 LBS | HEIGHT: 67 IN | BODY MASS INDEX: 37.67 KG/M2

## 2023-08-23 DIAGNOSIS — M75.121 NONTRAUMATIC COMPLETE TEAR OF RIGHT ROTATOR CUFF: Primary | ICD-10-CM

## 2023-08-23 PROCEDURE — 99024 POSTOP FOLLOW-UP VISIT: CPT | Performed by: ORTHOPAEDIC SURGERY

## 2023-08-23 NOTE — PROGRESS NOTES
INTO RIGHT EYE THREE TIMES DAILY      losartan-hydroCHLOROthiazide (HYZAAR) 100-25 MG per tablet Take 1 tablet by mouth daily      magnesium oxide (MAG-OX) 400 MG tablet Take 1 tablet by mouth daily      rosuvastatin (CRESTOR) 20 MG tablet Take 1 tablet by mouth daily      triamcinolone (ARISTOCORT) 0.5 % cream 2 times daily      phentermine (ADIPEX-P) 37.5 MG tablet Take 1 tablet by mouth daily. (Patient not taking: Reported on 6/13/2023)       No current facility-administered medications for this visit. Allergies   Allergen Reactions    Lisinopril      Other reaction(s): Unable to Obtain    Oxaprozin      Other reaction(s): Unknown (comments)    Pioglitazone      Other reaction(s): Unable to Obtain    Pioglitazone Hcl-Metformin Hcl Swelling    Simvastatin Myalgia    Topiramate Other (See Comments)     Unknown reaction       Past Surgical History:   Procedure Laterality Date    BREAST LUMPECTOMY      HYSTERECTOMY (CERVIX STATUS UNKNOWN)      OTHER SURGICAL HISTORY Left     wrist tendon release    TONSILLECTOMY         Social History     Socioeconomic History    Marital status:      Spouse name: Not on file    Number of children: Not on file    Years of education: Not on file    Highest education level: Not on file   Occupational History    Not on file   Tobacco Use    Smoking status: Never    Smokeless tobacco: Never   Substance and Sexual Activity    Alcohol use:  Yes     Alcohol/week: 0.8 standard drinks    Drug use: No    Sexual activity: Not on file   Other Topics Concern    Not on file   Social History Narrative    Not on file     Social Determinants of Health     Financial Resource Strain: Not on file   Food Insecurity: Not on file   Transportation Needs: Not on file   Physical Activity: Unknown    Days of Exercise per Week: 2 days    Minutes of Exercise per Session: Not on file   Stress: Not on file   Social Connections: Not on file   Intimate Partner Violence: Not At Risk    Fear of Current or

## 2023-08-24 ENCOUNTER — HOSPITAL ENCOUNTER (OUTPATIENT)
Facility: HOSPITAL | Age: 63
Setting detail: RECURRING SERIES
Discharge: HOME | End: 2023-08-27
Payer: COMMERCIAL

## 2023-08-24 PROCEDURE — 97110 THERAPEUTIC EXERCISES: CPT

## 2023-08-24 PROCEDURE — 97530 THERAPEUTIC ACTIVITIES: CPT

## 2023-08-24 NOTE — PROGRESS NOTES
PHYSICAL / OCCUPATIONAL THERAPY - DAILY TREATMENT NOTE (updated )    Patient Name: Paul Page    Date: 2023    : 1960  Insurance: Payor: Justine Richard / Plan: Amarjit Proud / Product Type: *No Product type* /      Patient  verified Yes     Visit #   Current / Total 8 16   Time   In / Out 12:30 1:10   Pain   In / Out 0/10 0/10   Subjective Functional Status/Changes: \"I don't have any pain right now. The doctor said I can start more strengthening in my shoulder now. \"   Changes to: Allergies, Med Hx, Sx Hx?   no       TREATMENT AREA =  Pain in right shoulder [M25.511]    OBJECTIVE    Therapeutic Procedures: Tx Min Billable or 1:1 Min (if diff from Tx Min) Procedure, Rationale, Specifics   30 30 84150 Therapeutic Exercise (timed):  increase ROM, strength, coordination, balance, and proprioception to improve patient's ability to progress to PLOF and address remaining functional goals. (see flow sheet as applicable)     Details if applicable:         20342 Neuromuscular Re-Education (timed):  improve balance, coordination, kinesthetic sense, posture, core stability and proprioception to improve patient's ability to develop conscious control of individual muscles and awareness of position of extremities in order to progress to PLOF and address remaining functional goals. (see flow sheet as applicable)     Details if applicable:     10 10 46537 Therapeutic Activity (timed):  use of dynamic activities replicating functional movements to increase ROM, strength, coordination, balance, and proprioception in order to improve patient's ability to progress to PLOF and address remaining functional goals. (see flow sheet as applicable)     Details if applicable:       79910 Self Care/Home Management (timed):  improve patient knowledge and understanding of pain reducing techniques, positioning, and posture/ergonomics  to improve patient's ability to progress to PLOF and address remaining functional goals.

## 2023-08-29 ENCOUNTER — HOSPITAL ENCOUNTER (OUTPATIENT)
Facility: HOSPITAL | Age: 63
Setting detail: RECURRING SERIES
Discharge: HOME | End: 2023-09-01
Payer: COMMERCIAL

## 2023-08-29 PROCEDURE — 97016 VASOPNEUMATIC DEVICE THERAPY: CPT

## 2023-08-29 PROCEDURE — 97530 THERAPEUTIC ACTIVITIES: CPT

## 2023-08-29 PROCEDURE — 97112 NEUROMUSCULAR REEDUCATION: CPT

## 2023-08-29 NOTE — PROGRESS NOTES
PHYSICAL / OCCUPATIONAL THERAPY - DAILY TREATMENT NOTE (updated )    Patient Name: Shana Back    Date: 2023    : 1960  Insurance: Payor: Eyad Avalos / Plan: [employer] / Product Type: *No Product type* /      Patient  verified Yes     Visit #   Current / Total 9 16   Time   In / Out 3:10 4:15   Pain   In / Out 0/10 0/10   Subjective Functional Status/Changes: Patient states it's still challenging for her to raise her right UE past shoulder height. Changes to: Allergies, Med Hx, Sx Hx?   no       TREATMENT AREA =  Pain in right shoulder [M25.511]    OBJECTIVE    Modalities Rationale:     decrease edema, decrease inflammation, and decrease pain to improve patient's ability to progress to PLOF and address remaining functional goals. min [] Estim Unattended, type/location:                                      []  w/ice    []  w/heat    min [] Estim Attended, type/location:                                     []  w/US     []  w/ice    []  w/heat    []  TENS insruct      min []  Mechanical Traction: type/lbs                   []  pro   []  sup   []  int   []  cont    []  before manual    []  after manual    min []  Ultrasound, settings/location:      min []  Iontophoresis w/ dexamethasone, location:                                               []  take home patch       []  in clinic        min  unbilled []  Ice     []  Heat    location/position:     min []  Paraffin,  details:    10 min [x]  Vasopneumatic Device, press/temp: Moderate/34 degs    min []  Yuri Cruz / Arthur Barrow: If using vaso (only need to measure limb vaso being performed on)      pre-treatment girth : 49      post-treatment girth : 49      measured at (landmark location) :  acromion    min []  Other:    Skin assessment post-treatment (if applicable):    []  intact    []  redness- no adverse reaction                 []redness - adverse reaction:         Therapeutic Procedures:   Tx Min Billable or 1:1 Min (if diff from Tx

## 2023-08-31 ENCOUNTER — APPOINTMENT (OUTPATIENT)
Facility: HOSPITAL | Age: 63
End: 2023-08-31
Payer: COMMERCIAL

## 2023-08-31 ENCOUNTER — TELEPHONE (OUTPATIENT)
Facility: HOSPITAL | Age: 63
End: 2023-08-31

## 2023-09-05 ENCOUNTER — APPOINTMENT (OUTPATIENT)
Facility: HOSPITAL | Age: 63
End: 2023-09-05
Payer: COMMERCIAL

## 2023-09-06 ENCOUNTER — HOSPITAL ENCOUNTER (OUTPATIENT)
Facility: HOSPITAL | Age: 63
Setting detail: RECURRING SERIES
Discharge: HOME | End: 2023-09-09
Payer: COMMERCIAL

## 2023-09-06 PROCEDURE — 97530 THERAPEUTIC ACTIVITIES: CPT

## 2023-09-06 NOTE — PROGRESS NOTES
PHYSICAL / OCCUPATIONAL THERAPY - DAILY TREATMENT NOTE (updated )    Patient Name: Peter De Dios    Date: 2023    : 1960  Insurance: Payor: Lidia Course / Plan: [facility] [employer] / Product Type: *No Product type* /      Patient  verified Yes     Visit #   Current / Total 10 16   Time   In / Out 9:19 9:49   Pain   In / Out 3/10 2/10   Subjective Functional Status/Changes: Patient states her shoulder is a little sore today from being in a car all day while driving back from [facility]. However, patient states most of the time she doesn't have any pain in her right shoulder. Changes to: Allergies, Med Hx, Sx Hx?   no       TREATMENT AREA =  Pain in right shoulder [M25.511]    OBJECTIVE    Therapeutic Procedures: Tx Min Billable or 1:1 Min (if diff from Tx Min) Procedure, Rationale, Specifics   30  36426 Therapeutic Activity (timed):  use of dynamic activities replicating functional movements to increase ROM, strength, coordination, balance, and proprioception in order to improve patient's ability to progress to PLOF and address remaining functional goals. (see flow sheet as applicable)     Details if applicable:     27  [facility] Totals Reminder: bill using total billable min of TIMED therapeutic procedures (example: do not include dry needle or estim unattended, both untimed codes, in totals to left)  8-22 min = 1 unit; 23-37 min = 2 units; 38-52 min = 3 units; 53-67 min = 4 units; 68-82 min = 5 units   Total Total     TOTAL TREATMENT TIME:        30     [x]  Patient Education billed concurrently with other procedures   [x] Review HEP    [] Progressed/Changed HEP, detail:    [] Other detail:       Objective Information/Functional Measures/Assessment    Limited time for exercises today as patient arrived late for her appointment. Re-measured AROM to see how that is doing.   She has improved flexion by 40 degrees in the last 2.5 weeks, but has made minimal change (4 degrees) during abduction and also

## 2023-09-07 ENCOUNTER — APPOINTMENT (OUTPATIENT)
Facility: HOSPITAL | Age: 63
End: 2023-09-07
Payer: COMMERCIAL

## 2023-09-08 ENCOUNTER — HOSPITAL ENCOUNTER (OUTPATIENT)
Facility: HOSPITAL | Age: 63
Setting detail: RECURRING SERIES
Discharge: HOME | End: 2023-09-11
Payer: COMMERCIAL

## 2023-09-08 PROCEDURE — 97535 SELF CARE MNGMENT TRAINING: CPT

## 2023-09-08 PROCEDURE — 97110 THERAPEUTIC EXERCISES: CPT

## 2023-09-08 PROCEDURE — 97016 VASOPNEUMATIC DEVICE THERAPY: CPT

## 2023-09-08 NOTE — PROGRESS NOTES
PHYSICAL / OCCUPATIONAL THERAPY - DAILY TREATMENT NOTE (updated )    Patient Name: Amando Biggs    Date: 2023    : 1960  Insurance: Payor: Сергей Tineo / Plan: Elaina Li / Product Type: *No Product type* /      Patient  verified Yes     Visit #   Current / Total 11 16   Time   In / Out 12:30 1:25   Pain   In / Out 2/10 0/10   Subjective Functional Status/Changes: \"I don't have too much pain in my shoulder right now. \"   Changes to: Allergies, Med Hx, Sx Hx?   no       TREATMENT AREA =  Pain in right shoulder [M25.511]    OBJECTIVE    Modalities Rationale:     decrease edema, decrease inflammation, and decrease pain to improve patient's ability to progress to PLOF and address remaining functional goals. min [] Estim Unattended, type/location:                                      []  w/ice    []  w/heat    min [] Estim Attended, type/location:                                     []  w/US     []  w/ice    []  w/heat    []  TENS insruct      min []  Mechanical Traction: type/lbs                   []  pro   []  sup   []  int   []  cont    []  before manual    []  after manual    min []  Ultrasound, settings/location:      min []  Iontophoresis w/ dexamethasone, location:                                               []  take home patch       []  in clinic        min  unbilled []  Ice     []  Heat    location/position:     min []  Paraffin,  details:    10 min [x]  Vasopneumatic Device, press/temp: Med/Low    min []  Quinten Patsy / Levern Boss: If using vaso (only need to measure limb vaso being performed on)      pre-treatment girth : 49.5 cm      post-treatment girth : 49 cm      measured at (landmark location) :  Acromion    min []  Other:    Skin assessment post-treatment (if applicable):    [x]  intact    []  redness- no adverse reaction                 []redness - adverse reaction:         Therapeutic Procedures:   Tx Min Billable or 1:1 Min (if diff from [employer]) Procedure, Rationale, Specifics

## 2023-09-11 ENCOUNTER — HOSPITAL ENCOUNTER (OUTPATIENT)
Facility: HOSPITAL | Age: 63
Setting detail: RECURRING SERIES
Discharge: HOME | End: 2023-09-14
Payer: COMMERCIAL

## 2023-09-11 PROCEDURE — 97530 THERAPEUTIC ACTIVITIES: CPT

## 2023-09-11 PROCEDURE — 97110 THERAPEUTIC EXERCISES: CPT

## 2023-09-11 PROCEDURE — 97112 NEUROMUSCULAR REEDUCATION: CPT

## 2023-09-11 NOTE — PROGRESS NOTES
PHYSICAL / OCCUPATIONAL THERAPY - DAILY TREATMENT NOTE (updated )    Patient Name: Darlyn Poon    Date: 2023    : 1960  Insurance: Payor: Hermilo Parks / Plan: Paula Pae / Product Type: *No Product type* /      Patient  verified Yes     Visit #   Current / Total 12 16   Time   In / Out 1:52 2:33   Pain   In / Out 0/10 0/10   Subjective Functional Status/Changes: Patient states she went for a massage and spa treatment this weekend, which has helped her shoulder feel better. Changes to: Allergies, Med Hx, Sx Hx?   no       TREATMENT AREA =  Pain in right shoulder [M25.511]    OBJECTIVE    Therapeutic Procedures: Tx Min Billable or 1:1 Min (if diff from Tx Min) Procedure, Rationale, Specifics   10 7 66640 Therapeutic Exercise (timed):  increase ROM, strength, coordination, balance, and proprioception to improve patient's ability to progress to PLOF and address remaining functional goals. (see flow sheet as applicable)    Details if applicable:       530 Therapeutic Activity (timed):  use of dynamic activities replicating functional movements to increase ROM, strength, coordination, balance, and proprioception in order to improve patient's ability to progress to PLOF and address remaining functional goals. (see flow sheet as applicable)    Details if applicable:       29454 Neuromuscular Re-Education (timed):  improve balance, coordination, kinesthetic sense, posture, core stability and proprioception to improve patient's ability to develop conscious control of individual muscles and awareness of position of extremities in order to progress to PLOF and address remaining functional goals.  (see flow sheet as applicable)     Details if applicable:     41 45 [facility] Totals Reminder: bill using total billable min of TIMED therapeutic procedures (example: do not include dry needle or estim unattended, both untimed codes, in totals to left)  8-22 min = 1 unit; 23-37 min = 2 units; 38-52

## 2023-09-12 ENCOUNTER — APPOINTMENT (OUTPATIENT)
Facility: HOSPITAL | Age: 63
End: 2023-09-12
Payer: COMMERCIAL

## 2023-09-13 ENCOUNTER — HOSPITAL ENCOUNTER (OUTPATIENT)
Facility: HOSPITAL | Age: 63
Setting detail: RECURRING SERIES
End: 2023-09-13
Payer: COMMERCIAL

## 2023-09-13 ENCOUNTER — TELEPHONE (OUTPATIENT)
Facility: HOSPITAL | Age: 63
End: 2023-09-13

## 2023-09-14 ENCOUNTER — APPOINTMENT (OUTPATIENT)
Facility: HOSPITAL | Age: 63
End: 2023-09-14
Payer: COMMERCIAL

## 2023-09-19 ENCOUNTER — HOSPITAL ENCOUNTER (OUTPATIENT)
Facility: HOSPITAL | Age: 63
Setting detail: RECURRING SERIES
Discharge: HOME | End: 2023-09-22
Payer: COMMERCIAL

## 2023-09-19 PROCEDURE — 97535 SELF CARE MNGMENT TRAINING: CPT

## 2023-09-19 PROCEDURE — 97110 THERAPEUTIC EXERCISES: CPT

## 2023-09-19 PROCEDURE — 97530 THERAPEUTIC ACTIVITIES: CPT

## 2023-09-19 NOTE — PROGRESS NOTES
PHYSICAL / OCCUPATIONAL THERAPY - DAILY TREATMENT NOTE (updated )    Patient Name: Eliza Delcid    Date: 2023    : 1960  Insurance: Payor: Twin Mej[employer] / Plan: Annabelle Root / Product Type: *No Product type* /      Patient  verified Yes     Visit #   Current / Total 13 16   Time   In / Out 1:10 2:03   Pain   In / Out 0/10 0/10   Subjective Functional Status/Changes: \"I don't have any pain right now. \"   Changes to: Allergies, Med Hx, Sx Hx?   no       TREATMENT AREA =  Pain in right shoulder [M25.511]    OBJECTIVE  Therapeutic Procedures: Tx Min Billable or 1:1 Min (if diff from Tx Min) Procedure, Rationale, Specifics   25 25 87997 Therapeutic Exercise (timed):  increase ROM, strength, coordination, balance, and proprioception to improve patient's ability to progress to PLOF and address remaining functional goals. (see flow sheet as applicable)    Details if applicable:         03885 Neuromuscular Re-Education (timed):  improve balance, coordination, kinesthetic sense, posture, core stability and proprioception to improve patient's ability to develop conscious control of individual muscles and awareness of position of extremities in order to progress to PLOF and address remaining functional goals. (see flow sheet as applicable)    Details if applicable:     13 13 71273 Therapeutic Activity (timed):  use of dynamic activities replicating functional movements to increase ROM, strength, coordination, balance, and proprioception in order to improve patient's ability to progress to PLOF and address remaining functional goals. (see flow sheet as applicable)     Details if applicable:     15 15 32882 Self Care/Home Management (timed):  improve patient knowledge and understanding of pain reducing techniques, positioning, and posture/ergonomics  to improve patient's ability to progress to PLOF and address remaining functional goals.   (see flow sheet as applicable)    Details if applicable:

## 2023-09-21 ENCOUNTER — APPOINTMENT (OUTPATIENT)
Facility: HOSPITAL | Age: 63
End: 2023-09-21
Payer: COMMERCIAL

## 2023-10-03 ENCOUNTER — TELEPHONE (OUTPATIENT)
Facility: HOSPITAL | Age: 63
End: 2023-10-03

## 2023-10-11 ENCOUNTER — OFFICE VISIT (OUTPATIENT)
Age: 63
End: 2023-10-11

## 2023-10-11 VITALS — BODY MASS INDEX: 37.67 KG/M2 | WEIGHT: 240 LBS | HEIGHT: 67 IN

## 2023-10-11 DIAGNOSIS — M75.121 NONTRAUMATIC COMPLETE TEAR OF RIGHT ROTATOR CUFF: Primary | ICD-10-CM

## 2023-10-11 PROCEDURE — 99213 OFFICE O/P EST LOW 20 MIN: CPT | Performed by: ORTHOPAEDIC SURGERY

## 2023-12-06 ENCOUNTER — OFFICE VISIT (OUTPATIENT)
Age: 63
End: 2023-12-06
Payer: COMMERCIAL

## 2023-12-06 VITALS
BODY MASS INDEX: 37.51 KG/M2 | HEIGHT: 67 IN | OXYGEN SATURATION: 99 % | WEIGHT: 239 LBS | TEMPERATURE: 97.2 F | HEART RATE: 81 BPM

## 2023-12-06 DIAGNOSIS — M51.36 OTHER INTERVERTEBRAL DISC DEGENERATION, LUMBAR REGION: ICD-10-CM

## 2023-12-06 DIAGNOSIS — M54.16 LUMBAR RADICULOPATHY: ICD-10-CM

## 2023-12-06 DIAGNOSIS — M48.061 SPINAL STENOSIS, LUMBAR REGION WITHOUT NEUROGENIC CLAUDICATION: ICD-10-CM

## 2023-12-06 PROCEDURE — 99213 OFFICE O/P EST LOW 20 MIN: CPT | Performed by: NURSE PRACTITIONER

## 2023-12-06 RX ORDER — PREGABALIN 225 MG/1
225 CAPSULE ORAL 2 TIMES DAILY
Qty: 180 CAPSULE | Refills: 1 | Status: SHIPPED | OUTPATIENT
Start: 2023-12-06 | End: 2024-06-03

## 2023-12-13 ENCOUNTER — OFFICE VISIT (OUTPATIENT)
Age: 63
End: 2023-12-13
Payer: COMMERCIAL

## 2023-12-13 VITALS — HEIGHT: 67 IN | RESPIRATION RATE: 18 BRPM | BODY MASS INDEX: 37.43 KG/M2

## 2023-12-13 DIAGNOSIS — M75.121 NONTRAUMATIC COMPLETE TEAR OF RIGHT ROTATOR CUFF: Primary | ICD-10-CM

## 2023-12-13 PROCEDURE — 99213 OFFICE O/P EST LOW 20 MIN: CPT | Performed by: ORTHOPAEDIC SURGERY

## 2023-12-13 NOTE — PROGRESS NOTES
(ACULAR) 0.5 % ophthalmic solution INSTILL 1 DROP INTO RIGHT EYE THREE TIMES DAILY      losartan-hydroCHLOROthiazide (HYZAAR) 100-25 MG per tablet Take 1 tablet by mouth daily      magnesium oxide (MAG-OX) 400 MG tablet Take 1 tablet by mouth daily      rosuvastatin (CRESTOR) 20 MG tablet Take 1 tablet by mouth daily      triamcinolone (ARISTOCORT) 0.5 % cream 2 times daily       No current facility-administered medications for this visit. Allergies   Allergen Reactions    Lisinopril      Other reaction(s): Unable to Obtain    Oxaprozin      Other reaction(s): Unknown (comments)    Pioglitazone      Other reaction(s): Unable to Obtain    Pioglitazone Hcl-Metformin Hcl Swelling    Simvastatin Myalgia    Topiramate Other (See Comments)     Unknown reaction       Past Surgical History:   Procedure Laterality Date    BREAST LUMPECTOMY      HYSTERECTOMY (CERVIX STATUS UNKNOWN)      OTHER SURGICAL HISTORY Left     wrist tendon release    TONSILLECTOMY         Social History     Socioeconomic History    Marital status:      Spouse name: Not on file    Number of children: Not on file    Years of education: Not on file    Highest education level: Not on file   Occupational History    Not on file   Tobacco Use    Smoking status: Never    Smokeless tobacco: Never   Substance and Sexual Activity    Alcohol use:  Yes     Alcohol/week: 0.8 standard drinks of alcohol    Drug use: No    Sexual activity: Not on file   Other Topics Concern    Not on file   Social History Narrative    Not on file     Social Determinants of Health     Financial Resource Strain: Not on file   Food Insecurity: Not on file   Transportation Needs: Not on file   Physical Activity: Unknown (1/6/2023)    Exercise Vital Sign     Days of Exercise per Week: 2 days     Minutes of Exercise per Session: Not on file   Stress: Not on file   Social Connections: Not on file   Intimate Partner Violence: Not At Risk (1/6/2023)    Humiliation, Afraid, Rape,
Intact

## 2024-04-10 ENCOUNTER — OFFICE VISIT (OUTPATIENT)
Age: 64
End: 2024-04-10
Payer: COMMERCIAL

## 2024-04-10 VITALS — WEIGHT: 242.2 LBS | HEIGHT: 67 IN | BODY MASS INDEX: 38.01 KG/M2 | TEMPERATURE: 97 F

## 2024-04-10 DIAGNOSIS — M17.11 PRIMARY OSTEOARTHRITIS OF RIGHT KNEE: ICD-10-CM

## 2024-04-10 DIAGNOSIS — M17.12 PRIMARY OSTEOARTHRITIS OF LEFT KNEE: Primary | ICD-10-CM

## 2024-04-10 PROCEDURE — 99214 OFFICE O/P EST MOD 30 MIN: CPT | Performed by: PHYSICIAN ASSISTANT

## 2024-04-10 PROCEDURE — 20611 DRAIN/INJ JOINT/BURSA W/US: CPT | Performed by: PHYSICIAN ASSISTANT

## 2024-04-10 RX ORDER — TRIAMCINOLONE ACETONIDE 40 MG/ML
40 INJECTION, SUSPENSION INTRA-ARTICULAR; INTRAMUSCULAR ONCE
Status: COMPLETED | OUTPATIENT
Start: 2024-04-10 | End: 2024-04-10

## 2024-04-10 RX ORDER — LIDOCAINE HYDROCHLORIDE 10 MG/ML
4 INJECTION, SOLUTION INFILTRATION; PERINEURAL ONCE
Status: COMPLETED | OUTPATIENT
Start: 2024-04-10 | End: 2024-04-10

## 2024-04-10 RX ADMIN — LIDOCAINE HYDROCHLORIDE 4 ML: 10 INJECTION, SOLUTION INFILTRATION; PERINEURAL at 16:10

## 2024-04-10 RX ADMIN — LIDOCAINE HYDROCHLORIDE 4 ML: 10 INJECTION, SOLUTION INFILTRATION; PERINEURAL at 16:11

## 2024-04-10 RX ADMIN — TRIAMCINOLONE ACETONIDE 40 MG: 40 INJECTION, SUSPENSION INTRA-ARTICULAR; INTRAMUSCULAR at 16:11

## 2024-04-10 NOTE — PROGRESS NOTES
Tabby Green  1960   Chief Complaint   Patient presents with    Knee Pain     bilateral        HISTORY OF PRESENT ILLNESS  Tabby Green is a 63 y.o. female who presents today for evaluation of bilateral knee pain.  She states this pain has been present for years.  She notes that she has had previous arthroscopies on both knees.  She has had viscosupplementation in the past greater than a year ago that has helped significantly.  Last cortisone injection was in December. Pain returned a few weeks ago. No injury. Pain is  a 5/10.  Following treatments: Injections:Yes; Brace:No; Therapy:No; Cane/Crutch:Yes      Allergies   Allergen Reactions    Lisinopril      Other reaction(s): Unable to Obtain    Oxaprozin      Other reaction(s): Unknown (comments)    Pioglitazone      Other reaction(s): Unable to Obtain    Pioglitazone Hcl-Metformin Hcl Swelling    Simvastatin Myalgia    Topiramate Other (See Comments)     Unknown reaction        Past Medical History:   Diagnosis Date    Arthritis     Cervical radiculopathy     Closed fracture of right hand with routine healing 07/2022    Diabetes (HCC)     Hypercholesteremia     Hypertension       Social History       Tobacco History       Smoking Status  Never      Smokeless Tobacco Use  Never              Alcohol History       Alcohol Use Status  Yes Amount  0.8 standard drinks of alcohol/wk              Drug Use       Drug Use Status  No              Sexual Activity       Sexually Active  Not Asked                   Past Surgical History:   Procedure Laterality Date    BREAST LUMPECTOMY      HYSTERECTOMY (CERVIX STATUS UNKNOWN)      OTHER SURGICAL HISTORY Left     wrist tendon release    TONSILLECTOMY        Family History   Problem Relation Age of Onset    Diabetes Other     Other Other         Connective tissue disorder    Cancer Other      Current Outpatient Medications   Medication Sig    pregabalin (LYRICA) 225 MG capsule Take 1 capsule by mouth 2

## 2024-05-23 ENCOUNTER — TELEPHONE (OUTPATIENT)
Age: 64
End: 2024-05-23

## 2024-05-23 NOTE — TELEPHONE ENCOUNTER
Patient called today to follow up on the status of her auth for Bilat knee gel injections.    Please review and advise patient, [phone]

## 2024-05-23 NOTE — TELEPHONE ENCOUNTER
Spoke with patient and informed her the auth was still pending. That we would look into referral and give her a call back once I have more information

## 2024-06-19 DIAGNOSIS — M48.061 SPINAL STENOSIS, LUMBAR REGION WITHOUT NEUROGENIC CLAUDICATION: ICD-10-CM

## 2024-06-19 DIAGNOSIS — M51.36 OTHER INTERVERTEBRAL DISC DEGENERATION, LUMBAR REGION: ICD-10-CM

## 2024-06-19 DIAGNOSIS — M54.16 LUMBAR RADICULOPATHY: ICD-10-CM

## 2024-06-19 NOTE — TELEPHONE ENCOUNTER
Patient called and stated that she'll be out of her medication Lyrica tomorrow and is asking for a 90 day supply.  She has an appt scheduled for 06/27/2024.            [facility] - [place], [place] - [address] - [place] [phone] - F [phone] [[id number]]

## 2024-06-20 RX ORDER — PREGABALIN 225 MG/1
CAPSULE ORAL
Qty: 60 CAPSULE | Refills: 0 | OUTPATIENT
Start: 2024-06-20

## 2024-06-21 RX ORDER — PREGABALIN 225 MG/1
225 CAPSULE ORAL 2 TIMES DAILY
Qty: 14 CAPSULE | Refills: 0 | Status: SHIPPED | OUTPATIENT
Start: 2024-06-21 | End: 2024-06-28

## 2024-06-21 NOTE — TELEPHONE ENCOUNTER
Pt came into the CB office this morning asking for enough medication until her appt.  I sent in 7 days of the lyrica 225 bid to her pharmacy.

## 2024-06-27 ENCOUNTER — OFFICE VISIT (OUTPATIENT)
Age: 64
End: 2024-06-27
Payer: COMMERCIAL

## 2024-06-27 VITALS
WEIGHT: 247 LBS | BODY MASS INDEX: 38.77 KG/M2 | OXYGEN SATURATION: 97 % | TEMPERATURE: 97.4 F | HEIGHT: 67 IN | HEART RATE: 98 BPM | SYSTOLIC BLOOD PRESSURE: 172 MMHG | DIASTOLIC BLOOD PRESSURE: 84 MMHG

## 2024-06-27 DIAGNOSIS — M51.36 OTHER INTERVERTEBRAL DISC DEGENERATION, LUMBAR REGION: ICD-10-CM

## 2024-06-27 DIAGNOSIS — M48.061 SPINAL STENOSIS, LUMBAR REGION WITHOUT NEUROGENIC CLAUDICATION: ICD-10-CM

## 2024-06-27 DIAGNOSIS — M54.16 LUMBAR RADICULOPATHY: ICD-10-CM

## 2024-06-27 PROCEDURE — 99213 OFFICE O/P EST LOW 20 MIN: CPT | Performed by: NURSE PRACTITIONER

## 2024-06-27 RX ORDER — PREGABALIN 225 MG/1
225 CAPSULE ORAL 2 TIMES DAILY
Qty: 180 CAPSULE | Refills: 1 | Status: SHIPPED | OUTPATIENT
Start: 2024-06-27 | End: 2024-12-24

## 2024-06-27 NOTE — PROGRESS NOTES
Tabby Green presents today for   Chief Complaint   Patient presents with    Back Pain     Back pain better since last visit   still having morning stiffness        Is someone accompanying this pt? No    Is the patient using any DME equipment during OV? No      Coordination of Care:  1. Have you been to the ER, urgent care clinic since your last visit? Yes, [facility]  neck pain  Hospitalized since your last visit? No    2. Have you seen or consulted any other health care providers outside of the [facility] since your last visit? No Include any pap smears or colon screening. No

## 2024-06-27 NOTE — PROGRESS NOTES
Chief complaint   Chief Complaint   Patient presents with    Back Pain     Back pain better since last visit   still having morning stiffness        History of Present Illness:  Tabby Green is a  63 y.o.  female  who comes in today for follow-up of her chronic low back pain that radiates into her bilateral left greater than right legs down to her foot.  She states she is doing about the same and the Lyrica 225 mg twice a day does help control her pain.  If she runs out of it her pain increases and she goes through withdrawal.   She does get some flares of pain from time to time but usually stretching will help that.  She denies any new medical issues.  She denies fever bowel bladder dysfunction pain.  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  She states she will be taking the summer off before she does start another job.        Physical Exam:.  Patient is a 63-year-old female well-developed well-nourished who is alert and oriented with a normal mood and affect.  She has a full weightbearing nonantalgic gait.  She did not use assist device.  She has 4 out of 5 strength bilateral lower extremities.  Negative straight leg raise.  She has some pain with hyperextension lumbar spine        Assessment and Plan:.  This this is a patient who has lumbar spinal stenosis and degenerative disc disease that gives her back pain with radicular pain.  I have refilled her Lyrica.  We will see her back in 6 months sooner if needed.    Tabby was seen today for back pain.    Diagnoses and all orders for this visit:    Spinal stenosis, lumbar region without neurogenic claudication  -     pregabalin (LYRICA) 225 MG capsule; Take 1 capsule by mouth 2 times daily for 180 days. Max Daily Amount: 450 mg    Lumbar radiculopathy  -     pregabalin (LYRICA) 225 MG capsule; Take 1 capsule by mouth 2 times daily for 180 days. Max Daily Amount: 450 mg    Other intervertebral disc degeneration,

## 2024-08-29 ENCOUNTER — OFFICE VISIT (OUTPATIENT)
Age: 64
End: 2024-08-29

## 2024-08-29 VITALS — WEIGHT: 246 LBS | BODY MASS INDEX: 39.53 KG/M2 | HEIGHT: 66 IN

## 2024-08-29 DIAGNOSIS — M17.11 PRIMARY OSTEOARTHRITIS OF RIGHT KNEE: ICD-10-CM

## 2024-08-29 DIAGNOSIS — M17.12 PRIMARY OSTEOARTHRITIS OF LEFT KNEE: Primary | ICD-10-CM

## 2024-08-29 RX ORDER — TRIAMCINOLONE ACETONIDE 40 MG/ML
40 INJECTION, SUSPENSION INTRA-ARTICULAR; INTRAMUSCULAR ONCE
Status: COMPLETED | OUTPATIENT
Start: 2024-08-29 | End: 2024-08-29

## 2024-08-29 RX ORDER — LIDOCAINE HYDROCHLORIDE 10 MG/ML
4 INJECTION, SOLUTION INFILTRATION; PERINEURAL ONCE
Status: COMPLETED | OUTPATIENT
Start: 2024-08-29 | End: 2024-08-29

## 2024-08-29 RX ADMIN — TRIAMCINOLONE ACETONIDE 40 MG: 40 INJECTION, SUSPENSION INTRA-ARTICULAR; INTRAMUSCULAR at 11:37

## 2024-08-29 RX ADMIN — LIDOCAINE HYDROCHLORIDE 4 ML: 10 INJECTION, SOLUTION INFILTRATION; PERINEURAL at 11:35

## 2024-08-29 RX ADMIN — TRIAMCINOLONE ACETONIDE 40 MG: 40 INJECTION, SUSPENSION INTRA-ARTICULAR; INTRAMUSCULAR at 11:38

## 2024-08-29 NOTE — PROGRESS NOTES
Tabby Green  1960   Chief Complaint   Patient presents with    Knee Pain     BILATERAL KNEE PAIN        HISTORY OF PRESENT ILLNESS  Tabby Green is a 64 y.o. female who presents today for reevaluation of bilateral knee pain. Pain is a 4/10. Patient received a bilateral knee cortisone injection on last OV 04/10/2024 which provided short term relief. Patient notes gel injections have provided relief in the past.   Continues to have problems with weightbearing terms of pain with walking getting up from a sitting position steps  Patient denies any fever, chills, chest pain, shortness of breath or calf pain. The remainder of the review of systems is negative. There are no new illness or injuries other than that mentioned above to report since last seen in the office. No changes in medications, allergies, social or family history.      PHYSICAL EXAM:   Ht 1.676 m (5' 6\")   Wt 111.6 kg (246 lb)   BMI 39.71 kg/m²   The patient is a well-developed, well-nourished female   in no acute distress.  The patient is alert and oriented times three.  The patient is alert and oriented times three. Mood and affect are normal.  LYMPHATIC: lymph nodes are not enlarged and are within normal limits  SKIN: normal in color and non tender to palpation. There are no bruises or abrasions noted.   NEUROLOGICAL: Motor sensory exam is within normal limits. Reflexes are equal bilaterally. There is normal sensation to pinprick and light touch  MUSCULOSKELETAL:  Examination Left knee Right knee   Skin Intact Intact   Range of motion     Effusion + +   Medial joint line tenderness + +   Lateral joint line tenderness + +   Tenderness Pes Bursa - -   Tenderness insertion MCL - -   Tenderness insertion LCL - -   eRid’s - -   Patella crepitus + +   Patella grind - -   Lachman - -   Pivot shift - -   Anterior drawer - -   Posterior drawer - -   Varus stress - -   Valgus stress - -   Neurovascular Intact Intact   Calf  Ortho Injection     AMB POC US DRAIN/INJECT LARGE JOINT/BURSA     lidocaine 1 % injection 4 mL     triamcinolone acetonide (KENALOG-40) injection 40 mg           PLAN:   1. Pt presents today with bilateral knee pain secondary to OA which is worsening.  Given the persistent problems after sterile prep injected Kenalog.. I will obtain authorization for gel injections as I think that this would be the next step  Risk factors include: High BMI  2. Yes cortisone injection indicated today   3. No Physical/Occupational Therapy indicated today  4. No diagnostic test indicated today:   5. No durable medical equipment indicated today  6. Yes referral indicated today   7. No medications indicated today:   8. No Narcotic indicated today for short term acute pain secondary to OA. Patient given pain medication for short term acute pain relief. Goal is to treat patient according to above plan and to ultimately have patient off all pain medications once appropriate. If chronic pain management is required beyond what is expected for current orthopedic problem, will refer patient to pain management.  was reviewed and will be reviewed with every medication refill request.       RTC PRN       By signing my name below, I RAIZA Dye, attest that this documentation has been prepared under the direction and in the presence of Merrill Frazier MD  Electronically signed: RAIZA Dye. 8/29/2024 8:00 AM EDT    I, Merrill Frazier MD, personally performed the services described in this documentation. I have authorized the scribe to complete the medical record entries input within this chart. I have reviewed the chart and agree that the record reflects my personal performance and is accurate and complete. [Electronically Signed: Merrill Frazier MD. 8/29/2024 8:00 AM EDT]

## 2024-09-20 ENCOUNTER — TELEPHONE (OUTPATIENT)
Age: 64
End: 2024-09-20

## 2024-12-18 ENCOUNTER — TELEPHONE (OUTPATIENT)
Age: 64
End: 2024-12-18

## 2024-12-18 DIAGNOSIS — M51.362 DEGENERATION OF INTERVERTEBRAL DISC OF LUMBAR REGION WITH DISCOGENIC BACK PAIN AND LOWER EXTREMITY PAIN: ICD-10-CM

## 2024-12-18 DIAGNOSIS — M48.061 SPINAL STENOSIS, LUMBAR REGION WITHOUT NEUROGENIC CLAUDICATION: ICD-10-CM

## 2024-12-18 DIAGNOSIS — M54.16 LUMBAR RADICULOPATHY: ICD-10-CM

## 2024-12-18 RX ORDER — PREGABALIN 225 MG/1
225 CAPSULE ORAL 2 TIMES DAILY
Qty: 180 CAPSULE | Refills: 0 | Status: SHIPPED | OUTPATIENT
Start: 2024-12-18 | End: 2025-03-18

## 2024-12-18 NOTE — TELEPHONE ENCOUNTER
Patient states that her last appt with Dr. Buitrago was canceled by the office and she will need a refill on her medication Lyrica soon.    Her last pill will be Sunday or Monday.  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      She's asking if she can be fit in during that time frame because she schedules her appts around her work schedule.    Please advise. Patient can be reached at [phone].      Pharmacy:    [facility] [address] [place] [address] [id number]  [place] [phone] - F [phone] [[id number]]

## 2025-01-02 ENCOUNTER — OFFICE VISIT (OUTPATIENT)
Age: 65
End: 2025-01-02
Payer: COMMERCIAL

## 2025-01-02 VITALS — HEIGHT: 66 IN | BODY MASS INDEX: 38.89 KG/M2 | WEIGHT: 242 LBS

## 2025-01-02 DIAGNOSIS — M17.11 PRIMARY OSTEOARTHRITIS OF RIGHT KNEE: ICD-10-CM

## 2025-01-02 DIAGNOSIS — M17.12 PRIMARY OSTEOARTHRITIS OF LEFT KNEE: Primary | ICD-10-CM

## 2025-01-02 PROCEDURE — 99214 OFFICE O/P EST MOD 30 MIN: CPT | Performed by: ORTHOPAEDIC SURGERY

## 2025-01-02 PROCEDURE — 20611 DRAIN/INJ JOINT/BURSA W/US: CPT | Performed by: ORTHOPAEDIC SURGERY

## 2025-01-02 RX ORDER — TRIAMCINOLONE ACETONIDE 40 MG/ML
80 INJECTION, SUSPENSION INTRA-ARTICULAR; INTRAMUSCULAR ONCE
Status: COMPLETED | OUTPATIENT
Start: 2025-01-02 | End: 2025-01-02

## 2025-01-02 RX ADMIN — TRIAMCINOLONE ACETONIDE 80 MG: 40 INJECTION, SUSPENSION INTRA-ARTICULAR; INTRAMUSCULAR at 15:23

## 2025-01-02 NOTE — PROGRESS NOTES
Tabby Green  1960   Chief Complaint   Patient presents with    Knee Pain     bilateral        HISTORY OF PRESENT ILLNESS  Tabby Green is a 64 y.o. female who presents today for reevaluation of bilateral knee pain. Pain is a 7/10. Pt received bilateral knee cortisone injections last OV on 8/29/2024 in which her knees responded to. She is having increased pain with prolonged activity.     Patient denies any fever, chills, chest pain, shortness of breath or calf pain. The remainder of the review of systems is negative. There are no new illness or injuries other than that mentioned above to report since last seen in the office. No changes in medications, allergies, social or family history.      PHYSICAL EXAM:   Ht 1.676 m (5' 6\")   Wt 109.8 kg (242 lb)   BMI 39.06 kg/m²   The patient is a well-developed, well-nourished female   in no acute distress.  The patient is alert and oriented times three.  The patient is alert and oriented times three. Mood and affect are normal.  LYMPHATIC: lymph nodes are not enlarged and are within normal limits  SKIN: normal in color and non tender to palpation. There are no bruises or abrasions noted.   NEUROLOGICAL: Motor sensory exam is within normal limits. Reflexes are equal bilaterally. There is normal sensation to pinprick and light touch  MUSCULOSKELETAL: Unchanged with diffuse swelling of both knees with crepitation on range of motion medial joint line tenderness         PROCEDURE: bilateral knee Injection with Ultrasound Guidance    Indication:  bilateral knee pain/swelling    After sterile prep, 4mL lidocaine with 1mL 40mg Kenalog were injected into the bilateral knee intraarticular under ultrasound guidance. Ultrasound images captured and scanned into patient's chart.        [facility] - [facility]  OFFICE PROCEDURE PROGRESS NOTE        Chart reviewed for the following:  IMerrill MD, have reviewed the History, Physical

## 2025-02-13 NOTE — PROGRESS NOTES
pain.    Diagnoses and all orders for this visit:    Spinal stenosis of lumbar region with neurogenic claudication  -     AMB POC XRAY, SPINE, LUMBOSACRAL; 4+  -     pregabalin (LYRICA) 300 MG capsule; Take 1 capsule by mouth 2 times daily for 90 days. Max Daily Amount: 600 mg    Lumbar spondylosis  -     pregabalin (LYRICA) 300 MG capsule; Take 1 capsule by mouth 2 times daily for 90 days. Max Daily Amount: 600 mg    Lumbar neuritis  -     pregabalin (LYRICA) 300 MG capsule; Take 1 capsule by mouth 2 times daily for 90 days. Max Daily Amount: 600 mg    Degeneration of intervertebral disc of lumbar region with discogenic back pain and lower extremity pain  -     pregabalin (LYRICA) 300 MG capsule; Take 1 capsule by mouth 2 times daily for 90 days. Max Daily Amount: 600 mg        IMPRESSION AND PLAN:  Patient returns to the office today with c/o lower back pain with radicular symptoms the BLE (R>>L), extending in a L5 distribution to the knee. Multiple treatment options were discussed. She is not interested in surgery or injection at this time. I discussed increasing her neuropathic medication, pt wished to proceed. I will increase her Lyrica 225 mg BID to 300 mg BID. She was advised to call the office if intolerant to higher dose. I will started her on a Medrol Dosepak, pending approval from her PCP secondary to DM. The patient is Neurologically intact. I will see the patient back in 6 months or earlier if needed.     Written by jazmine Pablo scribe, as dictated by Anthony Buitrago MD  I examined the patient, reviewed and agree with the note.

## 2025-02-14 ENCOUNTER — TELEPHONE (OUTPATIENT)
Age: 65
End: 2025-02-14

## 2025-02-14 ENCOUNTER — OFFICE VISIT (OUTPATIENT)
Age: 65
End: 2025-02-14

## 2025-02-14 VITALS
BODY MASS INDEX: 38.41 KG/M2 | HEIGHT: 66 IN | HEART RATE: 62 BPM | TEMPERATURE: 98 F | WEIGHT: 239 LBS | OXYGEN SATURATION: 98 %

## 2025-02-14 DIAGNOSIS — M47.816 LUMBAR SPONDYLOSIS: ICD-10-CM

## 2025-02-14 DIAGNOSIS — M51.362 DEGENERATION OF INTERVERTEBRAL DISC OF LUMBAR REGION WITH DISCOGENIC BACK PAIN AND LOWER EXTREMITY PAIN: ICD-10-CM

## 2025-02-14 DIAGNOSIS — M54.16 LUMBAR NEURITIS: ICD-10-CM

## 2025-02-14 DIAGNOSIS — M48.062 SPINAL STENOSIS OF LUMBAR REGION WITH NEUROGENIC CLAUDICATION: Primary | ICD-10-CM

## 2025-02-14 RX ORDER — INSULIN ASPART 100 [IU]/ML
INJECTION, SOLUTION INTRAVENOUS; SUBCUTANEOUS
COMMUNITY
Start: 2025-01-03

## 2025-02-14 RX ORDER — PREGABALIN 300 MG/1
300 CAPSULE ORAL 2 TIMES DAILY
Qty: 60 CAPSULE | Refills: 2 | Status: SHIPPED | OUTPATIENT
Start: 2025-02-14 | End: 2025-05-15

## 2025-02-14 NOTE — TELEPHONE ENCOUNTER
Called Rene Ruelas MD   at the request of Dr Buitrago to inquire if patient is cleared to take a medrol dosepak.    Spoke with Nancy who states she will call back after discussing with Rene Ruelas MD.

## 2025-02-14 NOTE — TELEPHONE ENCOUNTER
Returned call to pharmacy and spoke to the pharmacist, Luc, to clarify. Advised this was a dosing change from 225 to 300 today by Dr Buitrago and that the patient was given ramp instructions to increase.    All questions answered, Luc verbalized understanding.

## 2025-02-14 NOTE — TELEPHONE ENCOUNTER
Radha at [facility] pharmacy called for a confirmation on the medication increase on (Pregabalin).    She says you can speak with any pharmacists there.    Contact her back at [phone] or fax to [phone].

## 2025-02-14 NOTE — TELEPHONE ENCOUNTER
Received call back from Nancy who stated that Rene Ruelas MD needed that patient to get and updated A1C as she has not had one since 2023. They will call us back once the results of this blood test are received by their office.    Last A1C was 7.0    Called patient and used two identifiers, advised that Dr Kim needs an updated A1C before proceeding, All questions answered, patient verbalized understanding with intent to comply.

## 2025-03-14 ENCOUNTER — OFFICE VISIT (OUTPATIENT)
Age: 65
End: 2025-03-14
Payer: COMMERCIAL

## 2025-03-14 VITALS
OXYGEN SATURATION: 98 % | HEIGHT: 66 IN | HEART RATE: 73 BPM | TEMPERATURE: 98 F | WEIGHT: 239 LBS | BODY MASS INDEX: 38.41 KG/M2

## 2025-03-14 DIAGNOSIS — M51.362 DEGENERATION OF INTERVERTEBRAL DISC OF LUMBAR REGION WITH DISCOGENIC BACK PAIN AND LOWER EXTREMITY PAIN: ICD-10-CM

## 2025-03-14 DIAGNOSIS — M48.062 SPINAL STENOSIS OF LUMBAR REGION WITH NEUROGENIC CLAUDICATION: Primary | ICD-10-CM

## 2025-03-14 DIAGNOSIS — M47.816 LUMBAR SPONDYLOSIS: ICD-10-CM

## 2025-03-14 DIAGNOSIS — M54.16 LUMBAR NEURITIS: ICD-10-CM

## 2025-03-14 PROCEDURE — 99214 OFFICE O/P EST MOD 30 MIN: CPT | Performed by: PHYSICAL MEDICINE & REHABILITATION

## 2025-03-14 NOTE — PROGRESS NOTES
6\")   Wt 108.4 kg (239 lb)   SpO2 98%   BMI 38.58 kg/m²       CONSTITUTIONAL: NAD, A&O x 3    Ambulates without an assistive device.    MOTOR:  Straight Leg Raise: Negative, Bilaterally     Hip Flex Knee Ext Knee Flex Ankle DF GTE Ankle PF Tone   Right +4/5 +4/5 +4/5 +4/5 +4/5 +4/5 +4/5   Left +4/5 +4/5 +4/5 +4/5 +4/5 +4/5 +4/5     SENSATION: Sensation is intact to light touch throughout.     ASSESSMENT   Tabby was seen today for back pain.    Diagnoses and all orders for this visit:    Spinal stenosis of lumbar region with neurogenic claudication  -     MRI LUMBAR SPINE WO CONTRAST; Future    Lumbar spondylosis  -     MRI LUMBAR SPINE WO CONTRAST; Future    Lumbar neuritis  -     MRI LUMBAR SPINE WO CONTRAST; Future    Degeneration of intervertebral disc of lumbar region with discogenic back pain and lower extremity pain  -     MRI LUMBAR SPINE WO CONTRAST; Future        IMPRESSION AND PLAN:  Patient returns to the office today with c/o lower back pain with radicular symptoms the BLE (R>>L), extending in a L5 distribution to the knee. Multiple treatment options were discussed. I informed the pt some treatment options may be limited secondary to uncontrolled DM. I recommended the patient continue to perform her HEP everyday. I will order a Lumbar spine MRI w/o . I advised her to bring copies of the films to the next office visit. I recommended the pt continues on Lyrica 300 mg BID. She was advised to call the office if intolerant to higher dose. She does not need a refill of her neuropathic medication at this time, recommended she contacts the office before she runs out of the medication. The patient is Neurologically intact. I will see the patient back in 2 months or earlier if needed.     Written by jazmine Pablo scribe, as dictated by Anthony Buitrago MD  I examined the patient, reviewed and agree with the note.

## 2025-03-18 DIAGNOSIS — M48.062 SPINAL STENOSIS OF LUMBAR REGION WITH NEUROGENIC CLAUDICATION: ICD-10-CM

## 2025-03-18 DIAGNOSIS — M47.816 LUMBAR SPONDYLOSIS: ICD-10-CM

## 2025-03-18 DIAGNOSIS — M54.16 LUMBAR NEURITIS: ICD-10-CM

## 2025-03-18 DIAGNOSIS — M51.362 DEGENERATION OF INTERVERTEBRAL DISC OF LUMBAR REGION WITH DISCOGENIC BACK PAIN AND LOWER EXTREMITY PAIN: ICD-10-CM

## 2025-05-08 ENCOUNTER — OFFICE VISIT (OUTPATIENT)
Age: 65
End: 2025-05-08
Payer: COMMERCIAL

## 2025-05-08 VITALS
TEMPERATURE: 97.7 F | OXYGEN SATURATION: 100 % | HEART RATE: 86 BPM | BODY MASS INDEX: 37.9 KG/M2 | WEIGHT: 235.8 LBS | HEIGHT: 66 IN | RESPIRATION RATE: 14 BRPM

## 2025-05-08 DIAGNOSIS — M47.816 LUMBAR SPONDYLOSIS: ICD-10-CM

## 2025-05-08 DIAGNOSIS — M48.061 FORAMINAL STENOSIS OF LUMBAR REGION: ICD-10-CM

## 2025-05-08 DIAGNOSIS — M51.362 DEGENERATION OF INTERVERTEBRAL DISC OF LUMBAR REGION WITH DISCOGENIC BACK PAIN AND LOWER EXTREMITY PAIN: ICD-10-CM

## 2025-05-08 DIAGNOSIS — M54.16 LUMBAR NEURITIS: ICD-10-CM

## 2025-05-08 DIAGNOSIS — M48.062 SPINAL STENOSIS OF LUMBAR REGION WITH NEUROGENIC CLAUDICATION: Primary | ICD-10-CM

## 2025-05-08 DIAGNOSIS — M47.816 LUMBAR FACET ARTHROPATHY: ICD-10-CM

## 2025-05-08 PROCEDURE — 99214 OFFICE O/P EST MOD 30 MIN: CPT | Performed by: PHYSICAL MEDICINE & REHABILITATION

## 2025-05-08 RX ORDER — PREGABALIN 300 MG/1
300 CAPSULE ORAL 2 TIMES DAILY
Qty: 180 CAPSULE | Refills: 0 | Status: SHIPPED | OUTPATIENT
Start: 2025-05-08 | End: 2025-08-06

## 2025-05-08 NOTE — PROGRESS NOTES
[facility]  [address]  [place] [address]  Tel: [phone]  Fax: [phone]          PROGRESS NOTE      HISTORY OF PRESENT ILLNESS:  The patient is a 64 y.o. female and was seen today for follow up of lower back pain with radicular symptoms the BLE (R>>L), extending in a L5 distribution to the knee. Previously seen for low back pain radiating to the BLE(L>R), LLE in a S1 distribution to the foot, RLE in a S1 distribution to the mid thigh. Previously seen for progressive neck pain w/ RUE paresthesias into the elbow. She reports a fall in 9/2021 with immediate onset of neck and right shoulder pain. She states she had presented to urgent care on 9/10/2021 following  the fall and had noted improvement w/ treatment provided through urgent care. She had been doing well for several months but notes progression in pain 1 month ago with new onset of paresthesias into the upper RUE to the elbow, states it also radiates  to the right side of her face and right ear. Her neck pain is aggravated with cervical rotation and bending to the right. Pt denies dropping things or loss of balance. She denies any additional falls. Previously seen for  low back and bilateral hip pain radiating into the BLE (RLE>LLE) in a S1 distribution to the foot on the RLE. Previously, she was seen for low back and bilateral hip (R>L) pain. Previously, she was seen for low back pain. Previously, she  was seen with c/o paraesthesias in her shoulder. Previously, she was seen for pain localized to the low back. She denied radicular symptoms at the time. She was previously seen with low back pain radiating into the BLE (L>R) in an S1  distribution to the foot. She reports her pain is a near-constant 9/10. She describes symptoms consistent with spinal stenosis. Her pain was previously low back pain radiating into the BLE in an S1 distribution to the mid thigh. She was previously seen  with low back

## 2025-05-16 ENCOUNTER — OFFICE VISIT (OUTPATIENT)
Age: 65
End: 2025-05-16

## 2025-05-16 DIAGNOSIS — M17.11 PRIMARY OSTEOARTHRITIS OF RIGHT KNEE: ICD-10-CM

## 2025-05-16 DIAGNOSIS — M17.12 PRIMARY OSTEOARTHRITIS OF LEFT KNEE: Primary | ICD-10-CM

## 2025-05-16 RX ORDER — TRIAMCINOLONE ACETONIDE 40 MG/ML
80 INJECTION, SUSPENSION INTRA-ARTICULAR; INTRAMUSCULAR ONCE
Status: COMPLETED | OUTPATIENT
Start: 2025-05-16 | End: 2025-05-16

## 2025-05-16 RX ADMIN — TRIAMCINOLONE ACETONIDE 80 MG: 40 INJECTION, SUSPENSION INTRA-ARTICULAR; INTRAMUSCULAR at 09:32

## 2025-05-16 NOTE — PROGRESS NOTES
provider. We have discussed a self pay option for this if she doesn't get good relief with the cortisone injections today  Risk factors include: BMI>35, DM, HTN  2. Yes cortisone injection indicated today   3. No Physical/Occupational Therapy indicated today  4. No diagnostic test indicated today:   5. No durable medical equipment indicated today  6. No referral indicated today   7. No medications indicated today:   8. No Narcotic indicated today      RTC PRN       [facility]

## 2025-08-11 ENCOUNTER — OFFICE VISIT (OUTPATIENT)
Age: 65
End: 2025-08-11
Payer: MEDICARE

## 2025-08-11 VITALS
OXYGEN SATURATION: 98 % | TEMPERATURE: 98 F | HEART RATE: 79 BPM | HEIGHT: 69 IN | BODY MASS INDEX: 33.62 KG/M2 | WEIGHT: 227 LBS

## 2025-08-11 DIAGNOSIS — M48.061 FORAMINAL STENOSIS OF LUMBAR REGION: ICD-10-CM

## 2025-08-11 DIAGNOSIS — M48.061 SPINAL STENOSIS, LUMBAR REGION WITHOUT NEUROGENIC CLAUDICATION: ICD-10-CM

## 2025-08-11 DIAGNOSIS — M51.362 DEGENERATION OF INTERVERTEBRAL DISC OF LUMBAR REGION WITH DISCOGENIC BACK PAIN AND LOWER EXTREMITY PAIN: ICD-10-CM

## 2025-08-11 DIAGNOSIS — M48.062 SPINAL STENOSIS OF LUMBAR REGION WITH NEUROGENIC CLAUDICATION: Primary | ICD-10-CM

## 2025-08-11 DIAGNOSIS — M54.16 LUMBAR NEURITIS: ICD-10-CM

## 2025-08-11 DIAGNOSIS — M54.16 LUMBAR RADICULOPATHY: ICD-10-CM

## 2025-08-11 DIAGNOSIS — M47.816 LUMBAR SPONDYLOSIS: ICD-10-CM

## 2025-08-11 DIAGNOSIS — M47.816 LUMBAR FACET ARTHROPATHY: ICD-10-CM

## 2025-08-11 PROCEDURE — 1123F ACP DISCUSS/DSCN MKR DOCD: CPT | Performed by: PHYSICAL MEDICINE & REHABILITATION

## 2025-08-11 PROCEDURE — G8427 DOCREV CUR MEDS BY ELIG CLIN: HCPCS | Performed by: PHYSICAL MEDICINE & REHABILITATION

## 2025-08-11 PROCEDURE — G8417 CALC BMI ABV UP PARAM F/U: HCPCS | Performed by: PHYSICAL MEDICINE & REHABILITATION

## 2025-08-11 PROCEDURE — G8400 PT W/DXA NO RESULTS DOC: HCPCS | Performed by: PHYSICAL MEDICINE & REHABILITATION

## 2025-08-11 PROCEDURE — 3017F COLORECTAL CA SCREEN DOC REV: CPT | Performed by: PHYSICAL MEDICINE & REHABILITATION

## 2025-08-11 PROCEDURE — 1090F PRES/ABSN URINE INCON ASSESS: CPT | Performed by: PHYSICAL MEDICINE & REHABILITATION

## 2025-08-11 PROCEDURE — 1036F TOBACCO NON-USER: CPT | Performed by: PHYSICAL MEDICINE & REHABILITATION

## 2025-08-11 PROCEDURE — 99214 OFFICE O/P EST MOD 30 MIN: CPT | Performed by: PHYSICAL MEDICINE & REHABILITATION

## 2025-08-11 RX ORDER — PREGABALIN 300 MG/1
300 CAPSULE ORAL 2 TIMES DAILY
Qty: 180 CAPSULE | Refills: 0 | Status: SHIPPED | OUTPATIENT
Start: 2025-08-11 | End: 2025-11-09

## 2025-08-11 RX ORDER — DIAZEPAM 5 MG/1
TABLET ORAL
Qty: 1 TABLET | Refills: 0 | Status: SHIPPED | OUTPATIENT
Start: 2025-08-11 | End: 2025-08-13

## 2025-08-11 RX ORDER — SEMAGLUTIDE 0.68 MG/ML
INJECTION, SOLUTION SUBCUTANEOUS
COMMUNITY
Start: 2025-05-23

## 2025-08-11 RX ORDER — DIAZEPAM 5 MG/1
TABLET ORAL
Qty: 1 TABLET | Refills: 0 | Status: SHIPPED | OUTPATIENT
Start: 2025-08-11 | End: 2025-08-11 | Stop reason: ALTCHOICE